# Patient Record
Sex: FEMALE | Race: WHITE
[De-identification: names, ages, dates, MRNs, and addresses within clinical notes are randomized per-mention and may not be internally consistent; named-entity substitution may affect disease eponyms.]

---

## 2017-01-26 ENCOUNTER — HOSPITAL ENCOUNTER (OUTPATIENT)
Dept: HOSPITAL 75 - RAD | Age: 82
End: 2017-01-26
Attending: INTERNAL MEDICINE
Payer: MEDICARE

## 2017-01-26 DIAGNOSIS — R10.9: Primary | ICD-10-CM

## 2017-01-26 DIAGNOSIS — Z90.5: ICD-10-CM

## 2017-01-26 PROCEDURE — 76700 US EXAM ABDOM COMPLETE: CPT

## 2017-01-26 NOTE — XMS REPORT
Continuity of Care Document

 Created on: 2017



Jigna Herring

External Reference #: [id number]

: 1931

Sex: Female



Demographics







 Address  [address] [place]  [address]

 

 Home Phone  [phone]

 

 Preferred Language  English

 

 Marital Status  

 

 Bahai Affiliation  MET

 

 Race  White or 

 

 Ethnic Group  Not  or 





Author







 Author  [facility]

 

 Organization  [facility]

 

 Address  Unknown

 

 Phone  Unavailable







Support







 Name  Relationship  Address  Phone

 

 , Jessie Guerra  ECON  Unknown  [phone]

 

 , Jenny Guerra  ECON  Unknown  [phone]







Care Team Providers







 Care Team Member Name  Role  Phone

 

 KIERA Rai  PCP  Unavailable







Source Comments

Some departments are not documenting in the electronic medical record.  If you 
do not see the information that you expected, contact Release of Information in 
the Health Information Management department at [phone] for further 
assistance in locating additional records.[facility]



Active Allergies and Adverse Reactions







    



  Allergen   Noted Date   Severity   Reactions   Comments

 

    



  Seasonal Allergies   10/13/2016   Low   RHINITIS 

 

    



  Sulfa (Sulfonamide   10/13/2016   Low   UNKNOWN 



  Antibiotics)    







Current Medications







      



  Prescription   Sig.   Disp.   Refills   Start   End Date   Status



      Date  

 

      



  levothyroxine (SYNTHROID)   Take 88 mcg by mouth       Active



  88 mcg tablet   daily 30 minutes before     



   breakfast.     

 

      



  omeprazole DR(+)   Take 40 mg by mouth daily       Active



  (PRILOSEC) 40 mg capsule   before breakfast.     

 

      



  amLODIPine (NORVASC) 5 mg   Take 5 mg by mouth every       Active



  tablet   morning.     

 

      



  ALPRAZolam (XANAX) 0.25   Take 0.25 mg by mouth at       Active



  mg tablet   bedtime daily.     

 

      



  cholecalciferol (VITAMIN   Take 1,000 Units by mouth       Active



  D-3) 1,000 units tablet   daily.     

 

      



  cyanocobalamin (VITAMIN   Inject 1 mL into the       Active



  B-12, RUBRAMIN) 1,000   muscle every 30 days.     



  mcg/mL injection      

 

      



  fish oil /omega-3 fatty   Take 1 Cap by mouth       Active



  acids (SEA-OMEGA)   daily.     



  340/1000 mg capsule      

 

      



  atenolol (TENORMIN) 25 mg   Take 25 mg by mouth every       Active



  tablet   morning.     

 

      



  guaiFENesin LA (MUCINEX)   Take 600 mg by mouth       Active



  600 mg tablet   daily.     

 

      



  ibuprofen (ADVIL) 200 mg   Take 200 mg by mouth       Active



  tablet   every 6 hours as needed     



   for Pain. Take with food.     

 

      



  fluticasone (FLONASE) 50   Apply 1 Spray to each       Active



  mcg/actuation nasal spray   nostril as directed daily     



   as needed. Shake bottle     



   gently before using.     

 

      



  albuterol 0.5%   Inhale 2.5 mg solution by       Active



  (PROVENTIL; VENTOLIN) 2.5   nebulizer as directed     



  mg/0.5 mL nebulizer   every 6 hours as needed     



  solution   for Shortness of Breath     



   or Wheezing.     

 

      



  budesonide/formoterol   Inhale 1-2 Puffs by mouth       Active



  (SYMBICORT HFA) 160/4.5   into the lungs twice     



  mcg inhalation   daily.     

 

      



  meloxicam (MOBIC) 7.5 mg   Take 7.5 mg by mouth       Active



  tablet   daily as needed for Pain.     

 

      



  traMADol (ULTRAM) 50 mg   Take 1 Tab by mouth every   30 Tab   0   20  
  Active



  tablet   6 hours as needed for     16  



   Pain.     

 

      



  hyoscyamine (ANASPAZ;   Place 1 Tab under tongue   30 Tab   3   20    
Active



  NULEV; SYMAX FASTABS;   every 4 hours as needed.     16  



  HYOMAX-FT; ED-SPAZ;      



  OSCIMIN) 0.125 mg rapid      



  dissolve tablet      







Active Problems







 



  Problem   Noted Date

 

 



  Panlobular emphysema (HCC)   2016

 

 



  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

 

 



  Urothelial cancer ([facility])   10/13/2016













  Overview:



  *Patient was taken for cystoscopy and biopsy 16, with pathology



  revealed:



  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



  - No invasion of the observed subpithelial connective tissue is detected



  *Cytology at the time of biopsy also positive for urothelial cell



  carcinoma.





  16 OPERATIVE PROCEDURE: Right robotic-assisted laparoscopic



  nephroureterectomy with bladder cuff





  Pathology:



  A. Kidney and ureter, "right kidney and ureter", nephroureterectomy:



  Kidney: Noninvasive low grade papillary urothelial carcinoma, Greatest



  dimension: 2.8 cm



  Pathologic Staging (pTNM): pTa Nx Mx





  Last Assessment & Plan:



  Needs cystoscopy in 3 months ~ pt prefers to do closer to home, she will



  call to arrange with Dr Collins



  Will need routine FU cystoscopy every 3 months





  Copy of pathology report given to pt





  Okay to return to work when she is ready, no restrictions





  Discussed increased risks of recurrence with smoking, advised smoking



  cessation







Most Recent Encounters







    



  Date   Type   Specialty   Providers   Description

 

    



  2017   Office Visit   Urology   Chano Marinelli MD   Urothelial cancer (
HCC)



      (Primary Dx)

 

    



  2017   Hospital    Chano Marinelli MD   Malignant neoplasm of



   Encounter     right kidney, except



      renal pelvis (HCC)

 

    



  2016   Surgery    Chano Marinelli MD   ROBOTIC ASSISTED



      LAPAROSCOPIC VERSUS OPEN



      NEPHROURETERECTOMY WITH



      BLADDER CUFF

 

    



  10/26/2016   Telephone   Urology   Chano Marinelli MD   Surgery







Social History







    



  Tobacco Use   Types   Packs/Day   Years Used   Date

 

    



  Current Every Day Smoker   Cigarettes   1   65 









   



  Smokeless Tobacco: Never   



  Used   









 Comments: down to 3/4 pack/day at present









   



  Alcohol Use   Drinks/Week   oz/Week   Comments

 

   



  No   







Last Filed Vital Signs







  



  Vital Sign   Reading   Time Taken

 

  



  Blood Pressure   155/72   2017  1:02 PM CST

 

  



  Pulse   67   2017  1:02 PM CST

 

  



  Temperature   36.7   C (98   F)   2016  8:32 AM CST

 

  



  Respiratory Rate   -   -

 

  



  Height   1.549 m (5' 1")   2017  1:02 PM CST

 

  



  Weight   45.541 kg (100 lb 6.4 oz)   2017  1:02 PM CST

 

  



  Body Mass Index   18.98   2017  1:02 PM CST

 

  



  Oxygen Saturation   92%   2016  9:26 AM CST







Plan of Care







   



  Health Maintenance   Due Date   Last Done   Comments

 

   



  Physical (Comprehensive)   1938  



  Exam   

 

   



  Pertussis Vaccine   1942  

 

   



  Tetanus Vaccine   1948  

 

   



  Shingles Vaccine   1991  

 

   



  Osteoporosis Screening   1996  

 

   



  Prevnar/Pneumovax (#1)   1996  

 

   



  Influenza Vaccine   2016  







Procedures from Last 3 Months







    



  Procedure Name   Priority   Date/Time   Associated Diagnosis   Comments

 

    



  PROCEDURES-SCAN    2016    Results for this



    6:15 AM CST    procedure are in the



      results section.

 

    



  PROCEDURES-SCAN    2016    Results for this



    6:14 AM CST    procedure are in the



      results section.

 

    



  ANESTHESIA ARTERIAL LINE   Routine   2016    Results for this



  INSERTION    1:28 PM CST    procedure are in the



      results section.

 

    



  ROBOTIC ASSISTED    2016   Renal pelvis transitional 



  LAPAROSCOPIC VERSUS OPEN    12:35 PM CST   cell malignant neoplasm 



  NEPHROURETERECTOMY WITH     (HCC) 



  BLADDER CUFF    







Results from Last 3 Months

BASIC METABOLIC PANEL (2017 12:10 PM)Only the most recent of 4 results 
within the time period is included.





  



  Component   Value   Range

 

  



  Sodium   138   137-147 MMOL/L

 

  



  Potassium   3.8   3.5-5.1 MMOL/L

 

  



  Chloride   106    MMOL/L

 

  



  CO2   25   21-30 MMOL/L

 

  



  Anion Gap   7   3-12

 

  



  Glucose   70    MG/DL

 

  



  Blood Urea Nitrogen   21   7-25 MG/DL

 

  



  Creatinine   1.18 (H)   0.4-1.00 MG/DL

 

  



  Calcium   9.5   8.5-10.6 MG/DL

 

  



  eGFR Non    44 (L)Comment:   >60 mL/min



   The eGFR is not validated for use in drug dosing 



   adjustments.    Continue to use 



   estimated creatinine clearance per dosing 



   reference text.    Please contact the 



   Clinical Pharmacist for questions. 

 

  



  eGFR    53 (L)Comment:   >60 mL/min



   The eGFR is not validated for use in drug dosing 



   adjustments.    Continue to use 



   estimated creatinine clearance per dosing 



   reference text.    Please contact the 



   Clinical Pharmacist for questions. 









 Specimen

 

 Blood



PROCEDURES-SCAN (2016  6:15 AM)





 Narrative

 

 



Ordered by an unspecified provider.



PROCEDURES-SCAN (2016  6:14 AM)





 Narrative

 

 



Ordered by an unspecified provider.



CHEST 2 VIEWS (2016  6:45 AM)





 Impressions

 

 



Impression: Diffuse emphysematous changes with fibrotic and scarring changes as 
described.



 



 



 Finalized by Kit De Dios M.D. on 2016 7:12 AM. Dictated by Kit De Dios M.D. on 2016 7:02 AM.



 









 Narrative

 

 



PA and lateral chest



 



Clinical indication: Increasing oxygen requirements.



 



PA and lateral views the chest were obtained. There are mild diffuse 
emphysematous changes with fibrotic changes seen in the lung bases. There is an 
area of linear scarring or fissural thickening along the minor fissure and an 
area of linear opacity in the left lung base which may represent chronic 
scarring or atelectasis. There is blunting of the posterior also consistent 
with scarring although tiny effusions cannot be excluded. There is no evidence 
of focal infiltrate or vascular congestion.



 









 Procedure Note

 

 



Interface, Radiant Results - u 2016  7:15 AM CST



PA and lateral chest



Clinical indication: Increasing oxygen requirements.



PA and lateral views the chest were obtained. There are mild diffuse 
emphysematous changes with fibrotic changes seen in the lung bases. There is an 
area of linear scarring or fissural thickening along the minor fissure and an 
area of linear opacity in the left lung base which may represent chronic 
scarring or atelectasis. There is blunting of the posterior also consistent 
with scarring although tiny effusions cannot be excluded. There is no evidence 
of focal infiltrate or vascular congestion.



IMPRESSION

Impression: Diffuse emphysematous changes with fibrotic and scarring changes as 
described.





 Finalized by Kit De Dios M.D. on 2016 7:12 AM. Dictated by Kit De Dios M.D. on 2016 7:02 AM.





PTT (APTT) (2016  7:57 AM)





  



  Component   Value   Range

 

  



  APTT   27.9   24.0-40.0 SEC









 Specimen

 

 Blood



PROTIME INR (PT) (2016  7:57 AM)





  



  Component   Value   Range

 

  



  INR   1.0   0.8-1.2









 Specimen

 

 Blood



LACTIC ACID(LACTATE) (2016  7:57 AM)





  



  Component   Value   Range

 

  



  Lactic Acid   1.3   0.5-2.0 MMOL/L









 Specimen

 

 Blood



CBC (2016  7:57 AM)Only the most recent of 2 results within the time 
period is included.





  



  Component   Value   Range

 

  



  White Blood Cells   13.1 (H)   4.5-11.0 K/UL

 

  



  RBC   4.93   4.0-5.0 M/UL

 

  



  Hemoglobin   13.0   12.0-15.0 GM/DL

 

  



  Hematocrit   40.6   36-45 %

 

  



  MCV   82.3    FL

 

  



  MCH   26.4   26-34 PG

 

  



  MCHC   32.1   32.0-36.0 G/DL

 

  



  RDW   15.0   11-15 %

 

  



  Platelet Count   133 (L)   150-400 K/UL

 

  



  MPV   9.5   7-11 FL









 Specimen

 

 Blood



SURGICAL PATHOLOGY          (2016  3:42 PM)





  



  Component   Value   Range

 

  



  PATHOLOGY REPORT   [facility] 



   [email or web address] 



   Gabriela Fox MD, PhD, Director of Anatomic Pathology 



   Department of Pathology and Laboratory Medicine 



   [address] [place] [id number] 



   Surgical Pathology 



   Office:    [phone]    Fax:    [phone] 



   SURGICAL PATHOLOGY REPORT 



   NAME: JIGNA HERRING SURG PATH #: [id number] MR 



   #: [id number] SPECIMEN 



   CLASS: SR BILLING #: [id number] ALT ID 



   #:    LOCATION: DISCHARGED DATE OF 



   PROCEDURE: 2016 AGE:    85 SEX: F DATE 



   RECEIVED: 2016 : 



   1931    TIME RECEIVED:    15:42 PHYSICIAN: 



   CHANO MARINELLI     UROLUIS DATE OF 



   REPORT: 2016 COPY TO:    DATE OF PRINTIN/30/2016 



   ################################################## 



   ###################### 



   Final Diagnosis: 



   A. Kidney and ureter, "right kidney and ureter", 



   nephroureterectomy: 



   Kidney: Noninvasive low grade papillary urothelial 



   carcinoma. See 



   comment. 



   Ureter: No diagnostic abnormalities. 



   Resection margins free of involvement. 



   Comment: 



   RENAL PELVIS: Nephroureterectomy, Complete 



   Procedure 



   Nephroureterectomy, complete 



   Specimen Laterality 



   Right 



   Tumor Size 



   Greatest dimension: 2.8 cm 



   Additional dimensions: 1.6 x 1.2 cm 



   Tumor Type 



   Noninvasive carcinoma 



   Histologic Type 



   Urothelial (transitional cell) carcinoma 



   Associated Epithelial Lesions 



   None identified 



   Histologic Grade 



   Urothelial carcinoma 



   Low-grade 



   Microscopic Tumor Extension 



   Papillary noninvasive carcinoma 



   Tumor Configuration 



   Papillary 



   Margins 



   Margins uninvolved by invasive carcinoma/carcinoma 



   in 



   situ/noninvasive high-grade urothelial carcinoma 



   Distance of carcinoma from closest margin: 19.8cm 



   Specify closest margin: Ureteral resection margin 



   Lymph-Vascular Invasion 



   Not identified 



   Pathologic Staging (pTNM): pTa Nx Mx 



   Primary Tumor (pT) 



   pTa:         Papillary noninvasive carcinoma 



   Regional Lymph Nodes (pN) 



   pNX:         Cannot be assessed 



   Distant Metastasis (pM) 



   Not applicable 



   Additional Pathologic Findings 



   Other (specify): Not identified 



   Pathologic Findings in Ipsilateral Nonneoplastic 



   Renal Tissue 



   Significant pathologic alterations 



   None identified 



   The pathologic stage assigned here should be 



   regarded as provisional, as 



   it reflects only current pathologic data and does 



   not incorporate full 



   knowledge of the patient's clinical status and/or 



   prior pathology. 



   Pursuant to the  Program at the 



   [facility] Pathology Department, selected slides 



   from this case have been 



   concurrently reviewed by the following 



   pathologist: Dr. Steve Lowe who 



   agrees with the final diagnosis. 



   Attestation: 



   By this signature, I attest that I have personally 



   formulated the final 



   interpretation expressed in this report and that 



   the above diagnosis is 



   based upon my examination of the slides and/or 



   other material indicated in 



   this report. 



   +++Electronically Signed Out 



   By+++ 



   xc/2016 



   Interpreted by: 



   [place] MD, Attending Physician 



   JULES Javier    Resident 



   2016 



   ################################################## 



   ###################### 



   Material Received: 



   A: right kidney and ureter 



   History: 



   85-year-old female with a history of renal pelvis 



   transitional cell 



   malignant neoplasm. 



   Gross Description: 



   Fixative: Fresh 



   Labeled: "right kidney and ureter" 



   Weight: 210 g grams 



   Specimen Received: Radical nephrectomy with 



   ureterectomy 



   Specimen Measurement: Kidney-10.4 x 6.2 x 4.1 cm, 



   ureter-19.8 cm in length 



   by 0.5 cm in diameter 



   Tumor size: 2.8 x 1.6 x 1.2 cm 



   Tumor location: Renal pelvis, major calyces 



   Tumor appearance: Tan-pink exophytic and friable 



   Renal vein involved by tumor: No 



   Renal artery involved by tumor: No 



   Ureter involved by tumor: No 



   Renal Pelvis: Involved with tumor 



   Renal sinus fat involved by tumor: Yes 



   Uninvolved renal parenchyma: Red-brown with 



   clearly demarcated cortical 



   medullary junctions 



   Adrenal gland: No 



   Representative sections of the specimen are 



   submitted as follows: 



   A1         Vascular and ureteral margins. 



   A2-A3         Distal 3rd of ureter. 



   A4-A5         Mid-3rd of ureter. 



   A6-A7         Proximal 3rd of ureter. 



   A8         Tumor to adjacent normal kidney. 



   A9         Tumor to adjacent uninvolved renal 



   pelvis. 



   A10-A11         Tumor at deepest invasion. 



   A representative section is submitted to the 



   [facility] 



   Facility. ([facility]) 



   A12-A30    More renal pelvic sections for possible 



   tumor. (xc) 



   [facility]/2016            JULES Javier    Resident 



LACTIC ACID (BG - RAPID LACTATE) (2016  1:42 PM)





  



  Component   Value   Range

 

  



  Lactic Acid,BG   1.0   0.5-2.0 MMOL/L



POTASSIUM, BG (2016  1:42 PM)





  



  Component   Value   Range

 

  



  Potassium   3.2 (L)   3.5-5.1 MMOL/L









 Specimen

 

 Blood



SODIUM,BG (2016  1:42 PM)





  



  Component   Value   Range

 

  



  Sodium   145   137-147 MMOL/L









 Specimen

 

 Blood



IONIZED CALCIUM,BG (2016  1:42 PM)





  



  Component   Value   Range

 

  



  Ionized Calcium   1.26   1.0-1.3 MMOL/L









 Specimen

 

 Blood



GLUCOSE,BG (2016  1:42 PM)





  



  Component   Value   Range

 

  



  Glucose   113 (H)    MG/DL









 Specimen

 

 Blood



HEMOGLOBIN & HEMATOCRIT, BG (2016  1:42 PM)





  



  Component   Value   Range

 

  



  Hemoglobin BG   13.0   12.0-15.0 GM/DL

 

  



  Hematocrit BG   39.9   36-45 %









 Specimen

 

 Blood



BLOOD GASES, ARTERIAL (2016  1:42 PM)





  



  Component   Value   Range

 

  



  pH-Arterial   7.25 (L)   7.35-7.45

 

  



  pCO2-Arterial   61 (H)   35-45 MMHG

 

  



  pO2-Arterial   118 (H)    MMHG

 

  



  Base Deficit-Arterial   1.7   MMOL/L

 

  



  O2 Sat-Arterial   97.1   95-99 %

 

  



  Bicarbonate-ART-[place]   23.0   21-28 MMOL/L









 Specimen

 

 Blood, arterial - Blood



ANESTHESIA ARTERIAL LINE INSERTION (2016  1:28 PM)





 JAMIA Cameron 20161:28 PM



 



Anesthesia Procedure: Arterial Line Placement



 



A-LINE INSERTION



Date/Time: 2016 12:55 PM



 



Patient location: OR



Indications: multiple ABGs and hemodynamic monitoring



 



Staff 



Anesthesiologist: PALMIRA ALVAREZ



Performed by: AFSHIN MENDIETA



 



Preprocedure checklist performed: 2 patient identifiers, risks & 



benefits discussed, patient evaluated, timeout performed, consent 



obtained and patient being monitored



 



Arterial Line Procedure 



Patient sedated: yes (see MAR)



Sedation type: general; 



Artery prepped with chlorhexidine; skin prep agent completely 



dried prior to procedure.



Location: radial artery



Technique: palpation



Needle gauge: 20 G



Number of attempts: 1



 



Procedure Outcome



Catheter secured with adhesive dressing applied



Events: no complications noted during insertion and skin intact, 



warm, and dry



Observation: pt tolerated well



 



 



 



 



TYPE & CROSSMATCH (2016 11:25 AM)





  



  Component   Value   Range

 

  



  Units Ordered   2 

 

  



  Crossmatch Expires   2016 

 

  



  Record Check   FOUND 

 

  



  ABO/RH(D)   O POS 

 

  



  Antibody Screen   NEG 

 

  



  Electronic Crossmatch   YES 









 Specimen

 

 Blood

## 2017-01-26 NOTE — DIAGNOSTIC IMAGING REPORT
PROCEDURE: US abdomen complete.



TECHNIQUE: Multiple real-time grayscale images were obtained

over the abdomen in various projections.



INDICATION: History of right nephrectomy, lower abdominal pain.



FINDINGS:

The liver appeared normal. The biliary ducts were not

pathologically dilated. Filling defect is persistent within the

gallbladder lumen, no shadowing stone. The gallbladder wall

however was thickened at 5 mm. No pericholecystic fluid.

Sonographic Linn's sign reportedly negative. The pancreas

could not be visualized, obscured by overlying bowel gas. The

common bile duct was 7 mm at the upper limits of normal. There

is no intrahepatic biliary dilatation. The spleen is 8 cm,

nonfocal and normal. The visualized aorta is nonaneurysmal. The

IVC is largely obscured. The kidney is absent, the left kidney

is 10.5 cm unobstructed and nonfocal.



IMPRESSION:

Absent right kidney, unobstructed left kidney. No gallstone or

pathological biliary dilatation. No ascites.



Dictated by:



Dictated on workstation # [id number]

## 2017-06-30 ENCOUNTER — HOSPITAL ENCOUNTER (OUTPATIENT)
Dept: HOSPITAL 75 - RAD | Age: 82
End: 2017-06-30
Attending: INTERNAL MEDICINE
Payer: MEDICARE

## 2017-06-30 DIAGNOSIS — J44.9: Primary | ICD-10-CM

## 2017-06-30 PROCEDURE — 71020: CPT

## 2017-06-30 NOTE — DIAGNOSTIC IMAGING REPORT
EXAMINATION: PA and lateral views of the chest.



INDICATION: Shortness of breath.



COMPARISON: 2/23/2016.



FINDINGS:

The lungs are hyperinflated with chronic interstitial thickening

and right perihilar scarring similar to 2/23/2016. The heart size

is normal. No effusion or pneumothorax.



The mediastinum and edy appear unremarkable.



IMPRESSION: COPD.



Dictated by: 



  Dictated on workstation # [id number]

## 2017-07-29 ENCOUNTER — HOSPITAL ENCOUNTER (INPATIENT)
Dept: HOSPITAL 75 - ER | Age: 82
LOS: 6 days | Discharge: HOME HEALTH | DRG: 871 | End: 2017-08-04
Attending: INTERNAL MEDICINE | Admitting: INTERNAL MEDICINE
Payer: MEDICARE

## 2017-07-29 VITALS — WEIGHT: 99.06 LBS | HEIGHT: 61 IN | BODY MASS INDEX: 18.7 KG/M2

## 2017-07-29 DIAGNOSIS — Z85.528: ICD-10-CM

## 2017-07-29 DIAGNOSIS — N18.3: ICD-10-CM

## 2017-07-29 DIAGNOSIS — H91.90: ICD-10-CM

## 2017-07-29 DIAGNOSIS — T36.95XA: ICD-10-CM

## 2017-07-29 DIAGNOSIS — Z66: ICD-10-CM

## 2017-07-29 DIAGNOSIS — T17.890A: ICD-10-CM

## 2017-07-29 DIAGNOSIS — R19.7: ICD-10-CM

## 2017-07-29 DIAGNOSIS — I12.9: ICD-10-CM

## 2017-07-29 DIAGNOSIS — E89.0: ICD-10-CM

## 2017-07-29 DIAGNOSIS — J18.9: ICD-10-CM

## 2017-07-29 DIAGNOSIS — A41.9: Primary | ICD-10-CM

## 2017-07-29 DIAGNOSIS — M81.0: ICD-10-CM

## 2017-07-29 DIAGNOSIS — Z99.81: ICD-10-CM

## 2017-07-29 DIAGNOSIS — J44.1: ICD-10-CM

## 2017-07-29 DIAGNOSIS — F17.210: ICD-10-CM

## 2017-07-29 DIAGNOSIS — J44.0: ICD-10-CM

## 2017-07-29 DIAGNOSIS — Z90.5: ICD-10-CM

## 2017-07-29 LAB
BASOPHILS # BLD AUTO: 0 10^3/UL (ref 0–0.1)
BASOPHILS NFR BLD AUTO: 0 % (ref 0–10)
EOSINOPHIL # BLD AUTO: 0 10^3/UL (ref 0–0.3)
EOSINOPHIL NFR BLD AUTO: 0 % (ref 0–10)
ERYTHROCYTE [DISTWIDTH] IN BLOOD BY AUTOMATED COUNT: 14.4 % (ref 10–14.5)
LYMPHOCYTES # BLD AUTO: 0.7 X 10^3 (ref 1–4)
LYMPHOCYTES NFR BLD AUTO: 5 % (ref 12–44)
MCH RBC QN AUTO: 28 PG (ref 25–34)
MCHC RBC AUTO-ENTMCNC: 31 G/DL (ref 32–36)
MCV RBC AUTO: 89 FL (ref 80–99)
MONOCYTES # BLD AUTO: 1 X 10^3 (ref 0–1)
MONOCYTES NFR BLD AUTO: 7 % (ref 0–12)
NEUTROPHILS # BLD AUTO: 12.4 X 10^3 (ref 1.8–7.8)
NEUTROPHILS NFR BLD AUTO: 88 % (ref 42–75)
PLATELET # BLD: 252 10^3/UL (ref 130–400)
PMV BLD AUTO: 11.1 FL (ref 7.4–10.4)
RBC # BLD AUTO: 4.77 10^6/UL (ref 4.35–5.85)
WBC # BLD AUTO: 14.1 10^3/UL (ref 4.3–11)

## 2017-07-29 PROCEDURE — 85730 THROMBOPLASTIN TIME PARTIAL: CPT

## 2017-07-29 PROCEDURE — 87040 BLOOD CULTURE FOR BACTERIA: CPT

## 2017-07-29 PROCEDURE — 84100 ASSAY OF PHOSPHORUS: CPT

## 2017-07-29 PROCEDURE — 80048 BASIC METABOLIC PNL TOTAL CA: CPT

## 2017-07-29 PROCEDURE — 94760 N-INVAS EAR/PLS OXIMETRY 1: CPT

## 2017-07-29 PROCEDURE — 94640 AIRWAY INHALATION TREATMENT: CPT

## 2017-07-29 PROCEDURE — 88112 CYTOPATH CELL ENHANCE TECH: CPT

## 2017-07-29 PROCEDURE — 96365 THER/PROPH/DIAG IV INF INIT: CPT

## 2017-07-29 PROCEDURE — 87070 CULTURE OTHR SPECIMN AEROBIC: CPT

## 2017-07-29 PROCEDURE — 80053 COMPREHEN METABOLIC PANEL: CPT

## 2017-07-29 PROCEDURE — 87205 SMEAR GRAM STAIN: CPT

## 2017-07-29 PROCEDURE — 94761 N-INVAS EAR/PLS OXIMETRY MLT: CPT

## 2017-07-29 PROCEDURE — 36415 COLL VENOUS BLD VENIPUNCTURE: CPT

## 2017-07-29 PROCEDURE — 71010: CPT

## 2017-07-29 PROCEDURE — 85007 BL SMEAR W/DIFF WBC COUNT: CPT

## 2017-07-29 PROCEDURE — 87106 FUNGI IDENTIFICATION YEAST: CPT

## 2017-07-29 PROCEDURE — 85025 COMPLETE CBC W/AUTO DIFF WBC: CPT

## 2017-07-29 PROCEDURE — 88305 TISSUE EXAM BY PATHOLOGIST: CPT

## 2017-07-29 PROCEDURE — 81000 URINALYSIS NONAUTO W/SCOPE: CPT

## 2017-07-29 PROCEDURE — 85610 PROTHROMBIN TIME: CPT

## 2017-07-29 PROCEDURE — 85027 COMPLETE CBC AUTOMATED: CPT

## 2017-07-29 PROCEDURE — 71250 CT THORAX DX C-: CPT

## 2017-07-29 PROCEDURE — 83605 ASSAY OF LACTIC ACID: CPT

## 2017-07-29 PROCEDURE — 83735 ASSAY OF MAGNESIUM: CPT

## 2017-07-29 PROCEDURE — 87101 SKIN FUNGI CULTURE: CPT

## 2017-07-29 PROCEDURE — 87116 MYCOBACTERIA CULTURE: CPT

## 2017-07-30 VITALS — SYSTOLIC BLOOD PRESSURE: 137 MMHG | DIASTOLIC BLOOD PRESSURE: 75 MMHG

## 2017-07-30 VITALS — DIASTOLIC BLOOD PRESSURE: 55 MMHG | SYSTOLIC BLOOD PRESSURE: 115 MMHG

## 2017-07-30 VITALS — SYSTOLIC BLOOD PRESSURE: 138 MMHG | DIASTOLIC BLOOD PRESSURE: 80 MMHG

## 2017-07-30 VITALS — SYSTOLIC BLOOD PRESSURE: 125 MMHG | DIASTOLIC BLOOD PRESSURE: 71 MMHG

## 2017-07-30 VITALS — DIASTOLIC BLOOD PRESSURE: 78 MMHG | SYSTOLIC BLOOD PRESSURE: 149 MMHG

## 2017-07-30 VITALS — DIASTOLIC BLOOD PRESSURE: 67 MMHG | SYSTOLIC BLOOD PRESSURE: 130 MMHG

## 2017-07-30 VITALS — DIASTOLIC BLOOD PRESSURE: 58 MMHG | SYSTOLIC BLOOD PRESSURE: 131 MMHG

## 2017-07-30 VITALS — SYSTOLIC BLOOD PRESSURE: 136 MMHG | DIASTOLIC BLOOD PRESSURE: 63 MMHG

## 2017-07-30 LAB
ALBUMIN SERPL-MCNC: 4 GM/DL (ref 3.2–4.5)
ALT SERPL-CCNC: 8 U/L (ref 0–55)
ANION GAP SERPL CALC-SCNC: 11 MMOL/L (ref 5–14)
ANION GAP SERPL CALC-SCNC: 12 MMOL/L (ref 5–14)
APTT BLD: 35 SEC (ref 24–35)
AST SERPL-CCNC: 15 U/L (ref 5–34)
BASOPHILS # BLD AUTO: 0 10^3/UL (ref 0–0.1)
BASOPHILS NFR BLD AUTO: 0 % (ref 0–10)
BILIRUB SERPL-MCNC: 0.9 MG/DL (ref 0.1–1)
BILIRUB UR QL STRIP: NEGATIVE
BUN SERPL-MCNC: 25 MG/DL (ref 7–18)
BUN SERPL-MCNC: 25 MG/DL (ref 7–18)
BUN/CREAT SERPL: 18
BUN/CREAT SERPL: 18
CALCIUM SERPL-MCNC: 8.8 MG/DL (ref 8.5–10.1)
CALCIUM SERPL-MCNC: 9.9 MG/DL (ref 8.5–10.1)
CHLORIDE SERPL-SCNC: 102 MMOL/L (ref 98–107)
CHLORIDE SERPL-SCNC: 99 MMOL/L (ref 98–107)
CO2 SERPL-SCNC: 21 MMOL/L (ref 21–32)
CO2 SERPL-SCNC: 26 MMOL/L (ref 21–32)
CREAT SERPL-MCNC: 1.37 MG/DL (ref 0.6–1.3)
CREAT SERPL-MCNC: 1.4 MG/DL (ref 0.6–1.3)
EOSINOPHIL # BLD AUTO: 0 10^3/UL (ref 0–0.3)
EOSINOPHIL NFR BLD AUTO: 0 % (ref 0–10)
ERYTHROCYTE [DISTWIDTH] IN BLOOD BY AUTOMATED COUNT: 14.2 % (ref 10–14.5)
GFR SERPLBLD BASED ON 1.73 SQ M-ARVRAT: 36 ML/MIN
GFR SERPLBLD BASED ON 1.73 SQ M-ARVRAT: 37 ML/MIN
GLUCOSE SERPL-MCNC: 134 MG/DL (ref 70–105)
GLUCOSE SERPL-MCNC: 257 MG/DL (ref 70–105)
INR PPP: 1 (ref 0.8–1.4)
KETONES UR QL STRIP: (no result)
LEUKOCYTE ESTERASE UR QL STRIP: (no result)
LYMPHOCYTES # BLD AUTO: 0.5 X 10^3 (ref 1–4)
LYMPHOCYTES NFR BLD AUTO: 4 % (ref 12–44)
MCH RBC QN AUTO: 28 PG (ref 25–34)
MCHC RBC AUTO-ENTMCNC: 31 G/DL (ref 32–36)
MCV RBC AUTO: 89 FL (ref 80–99)
MONOCYTES # BLD AUTO: 0.3 X 10^3 (ref 0–1)
MONOCYTES NFR BLD AUTO: 3 % (ref 0–12)
NEUTROPHILS # BLD AUTO: 11.2 X 10^3 (ref 1.8–7.8)
NEUTROPHILS NFR BLD AUTO: 93 % (ref 42–75)
NITRITE UR QL STRIP: NEGATIVE
PH UR STRIP: 5 [PH] (ref 5–9)
PLATELET # BLD: 194 10^3/UL (ref 130–400)
PMV BLD AUTO: 11.7 FL (ref 7.4–10.4)
POTASSIUM SERPL-SCNC: 4.3 MMOL/L (ref 3.6–5)
POTASSIUM SERPL-SCNC: 4.4 MMOL/L (ref 3.6–5)
PROT SERPL-MCNC: 8 GM/DL (ref 6.4–8.2)
PROT UR QL STRIP: (no result)
PROTHROMBIN TIME: 12.9 SEC (ref 12.2–14.7)
RBC # BLD AUTO: 4.2 10^6/UL (ref 4.35–5.85)
SODIUM SERPL-SCNC: 134 MMOL/L (ref 135–145)
SODIUM SERPL-SCNC: 137 MMOL/L (ref 135–145)
SP GR UR STRIP: 1.02 (ref 1.02–1.02)
UROBILINOGEN UR-MCNC: NORMAL MG/DL
WBC # BLD AUTO: 12 10^3/UL (ref 4.3–11)
WBC #/AREA URNS HPF: (no result) /HPF

## 2017-07-30 RX ADMIN — IPRATROPIUM BROMIDE AND ALBUTEROL SULFATE SCH ML: .5; 3 SOLUTION RESPIRATORY (INHALATION) at 14:15

## 2017-07-30 RX ADMIN — IPRATROPIUM BROMIDE AND ALBUTEROL SULFATE SCH ML: .5; 3 SOLUTION RESPIRATORY (INHALATION) at 19:10

## 2017-07-30 RX ADMIN — PANTOPRAZOLE SODIUM SCH MG: 40 TABLET, DELAYED RELEASE ORAL at 13:00

## 2017-07-30 RX ADMIN — SODIUM CHLORIDE SCH MLS/HR: 900 INJECTION INTRAVENOUS at 15:31

## 2017-07-30 RX ADMIN — AMLODIPINE BESYLATE SCH MG: 5 TABLET ORAL at 12:59

## 2017-07-30 RX ADMIN — METHYLPREDNISOLONE SODIUM SUCCINATE SCH MG: 40 INJECTION, POWDER, FOR SOLUTION INTRAMUSCULAR; INTRAVENOUS at 12:00

## 2017-07-30 RX ADMIN — SODIUM CHLORIDE SCH MLS/HR: 900 INJECTION, SOLUTION INTRAVENOUS at 18:04

## 2017-07-30 RX ADMIN — SODIUM CHLORIDE SCH MLS/HR: 900 INJECTION INTRAVENOUS at 08:28

## 2017-07-30 RX ADMIN — ALPRAZOLAM SCH MG: 0.25 TABLET ORAL at 22:59

## 2017-07-30 RX ADMIN — IPRATROPIUM BROMIDE AND ALBUTEROL SULFATE SCH ML: .5; 3 SOLUTION RESPIRATORY (INHALATION) at 06:26

## 2017-07-30 RX ADMIN — SODIUM CHLORIDE SCH MLS/HR: 900 INJECTION, SOLUTION INTRAVENOUS at 02:15

## 2017-07-30 RX ADMIN — NICOTINE SCH MG: 14 PATCH, EXTENDED RELEASE TRANSDERMAL at 09:09

## 2017-07-30 RX ADMIN — IPRATROPIUM BROMIDE AND ALBUTEROL SULFATE SCH ML: .5; 3 SOLUTION RESPIRATORY (INHALATION) at 10:04

## 2017-07-30 RX ADMIN — METHYLPREDNISOLONE SODIUM SUCCINATE SCH MG: 40 INJECTION, POWDER, FOR SOLUTION INTRAMUSCULAR; INTRAVENOUS at 05:39

## 2017-07-30 RX ADMIN — ATENOLOL SCH MG: 25 TABLET ORAL at 13:00

## 2017-07-30 RX ADMIN — IPRATROPIUM BROMIDE AND ALBUTEROL SULFATE SCH ML: .5; 3 SOLUTION RESPIRATORY (INHALATION) at 21:47

## 2017-07-30 RX ADMIN — LEVOTHYROXINE SODIUM SCH MCG: 0.09 TABLET ORAL at 13:00

## 2017-07-30 RX ADMIN — METHYLPREDNISOLONE SODIUM SUCCINATE SCH MG: 40 INJECTION, POWDER, FOR SOLUTION INTRAMUSCULAR; INTRAVENOUS at 18:02

## 2017-07-30 RX ADMIN — SODIUM CHLORIDE SCH MLS/HR: 900 INJECTION INTRAVENOUS at 23:00

## 2017-07-30 NOTE — ED GENERAL
General


Chief Complaint:  Respiratory Problems


Stated Complaint:  SOA


Nursing Triage Note:  


daughter states since thursday breathing has been worse. pt usually wears 

oxygen 


at night. hx of COPD


Nursing Sepsis Screen:  No Definite Risk


Source of Information:  Patient, Family


Exam Limitations:  No Limitations





History of Present Illness


Time Seen by Provider:  23:35


Initial Comments


This 86-year-old woman presents to the emergency room with respiratory 

distress.  She arrives via EMS.  O2 saturation was reportedly 70 percent on 

room air.  She uses 3 L by nasal cannula at home.  She reports cough and upset 

stomach that started on Thursday.  Temperature on arrival today is 101.7.  

Family reports her heart rate at one point at home was 140.  She has not used 

any breathing treatments of any kind today.  Patient is still a smoker.  She 

confirms DO NOT RESUSCITATE orders.





Allergies and Home Medications


Allergies


Coded Allergies:  


     No Known Drug Allergies (Verified , 10/6/14)





Home Medications


Alprazolam 0.25 Mg Tablet, 0.25 MG PO HS, (Reported)


Amlodipine Besylate 5 Mg Tablet, 5 MG PO DAILY, (Reported)


Atenolol 25 Mg Tablet, 25 MG PO DAILY, (Reported)


Cholecalciferol 1,000 Unit Tablet, 1,000 UNIT PO DAILY, (Reported)


Levothyroxine Sodium 88 Mcg Tab, 88 MCG PO DAILY, (Reported)


Meloxicam 7.5 Mg Tablet, 7.5 MG PO DAILY PRN for PAIN, (Reported)


Omega 3 Polyunsat Fatty Acids 1,000 Mg Cap, 1,000 MG PO DAILY, (Reported)


Omeprazole 40 Mg Capsule.dr, 40 MG PO DAILY, (Reported)





Constitutional:  see HPI


EENTM:  no symptoms reported


Respiratory:  see HPI


Cardiovascular:  no symptoms reported


Gastrointestinal:  see HPI, nausea


Genitourinary:  no symptoms reported


Musculoskeletal:  no symptoms reported


Skin:  no symptoms reported


Psychiatric/Neurological:  See HPI


Hematologic/Lymphatic:  No Symptoms Reported


Immunological/Allergic:  no symptoms reported





Past Medical-Social-Family Hx


Patient Social History


Recent Foreign Travel:  No


Contact w/Someone Who Travel:  No


Recent Infectious Disease Expo:  No





Immunizations Up To Date


Date of Influenza Vaccine:  Sep 1, 2011





Surgeries


HX Surgeries:  Yes (RIGHT HIP,HYST.,THYROIDECTOMY 95%, cataract)


Surgeries:  Hysterectomy, Lumpectomy, Orthopedic, Renal (nephrectomy)





Respiratory


Hx Respiratory Disorders:  Yes (COPD)


Respiratory Disorders:  COPD (with chronic hypoxia)





Cardiovascular


Hx Cardiac Disorders:  Yes


Cardiac Disorders:  Hypertension





Neurological


Hx Neurological Disorders:  No





Reproductive System


Hx Reproductive Disorders:  No


Sexually Transmitted Disease:  No





Genitourinary


Hx Genitourinary Disorders:  No





Gastrointestinal


Hx Gastrointestinal Disorders:  No





Musculoskeletal


Hx Musculoskeletal Disorders:  Yes


Musculoskeletal Disorders:  Osteoporosis





Endocrine


Hx Endocrine Disorders:  Yes


Endocrine Disorders:  Hypothyroidsim





HEENT


HX ENT Disorders:  Yes (cataracts removed bilat)





Cancer


Hx Cancer:  Yes


Cancer:  Kidney





Psychosocial


Hx Psychiatric Problems:  No





Integumentary


HX Skin/Integumentary Disorder:  No





Blood Transfusions


Hx Blood Disorders:  No





Family Medical History


Family Medial History:  


Alcoholism


  19 FATHER


Arthritis


  19 MOTHER


Diabetes mellitus


  19 MOTHER


Hypertension


  19 MOTHER


Neoplasm (breast cancer)


  19 MOTHER


Osteoporosis


  19 MOTHER





Physical Exam


Vital Signs





Vital Sign - Last 12Hours








 7/29/17





 23:33


 


Temp 101.7


 


Pulse 88


 


Resp 20


 


B/P (MAP) 133/76


 


Pulse Ox 70


 


O2 Delivery Room Air


 


O2 Flow Rate 5.00





Capillary Refill : Less Than 3 Seconds


General Appearance:  WD/WN, Moderate Distress (respiratory)


HEENT:  PERRL/EOMI, Normal ENT Inspection, Pharynx Normal


Neck:  Normal Inspection


Respiratory:  Accessory Muscle Use, Crackles, Respiratory Distress, Rhonci


Cardiovascular:  Regular Rate, Rhythm, No Edema, No Murmur


Gastrointestinal:  Normal Bowel Sounds, Non Tender, Soft


Extremity:  Normal Inspection, No Pedal Edema


Neurologic/Psychiatric:  Alert, Oriented x3, No Motor/Sensory Deficits, Normal 

Mood/Affect, CNs II-XII Norm as Tested


Skin:  Normal Color, Warm/Dry





Focused Exam


Lactic Acid Level





Laboratory Tests








Test


  7/29/17


23:40


 


Lactic Acid Level


  1.31 MMOL/L


(0.50-2.00)











Progress/Results/Core Measures


Results/Orders


Lab Results





Laboratory Tests








Test


  7/29/17


23:40 7/30/17


01:20 Range/Units


 


 


White Blood Count


  14.1 H


  


  4.3-11.0


10^3/uL


 


Red Blood Count


  4.77 


  


  4.35-5.85


10^6/uL


 


Hemoglobin 13.2   11.5-16.0  G/DL


 


Hematocrit 42   35-52  %


 


Mean Corpuscular Volume 89   80-99  FL


 


Mean Corpuscular Hemoglobin 28   25-34  PG


 


Mean Corpuscular Hemoglobin


Concent 31 L


  


  32-36  G/DL


 


 


Red Cell Distribution Width 14.4   10.0-14.5  %


 


Platelet Count


  252 


  


  130-400


10^3/uL


 


Mean Platelet Volume 11.1 H  7.4-10.4  FL


 


Neutrophils (%) (Auto) 88 H  42-75  %


 


Lymphocytes (%) (Auto) 5 L  12-44  %


 


Monocytes (%) (Auto) 7   0-12  %


 


Eosinophils (%) (Auto) 0   0-10  %


 


Basophils (%) (Auto) 0   0-10  %


 


Neutrophils # (Auto) 12.4 H  1.8-7.8  X 10^3


 


Lymphocytes # (Auto) 0.7 L  1.0-4.0  X 10^3


 


Monocytes # (Auto) 1.0   0.0-1.0  X 10^3


 


Eosinophils # (Auto)


  0.0 


  


  0.0-0.3


10^3/uL


 


Basophils # (Auto)


  0.0 


  


  0.0-0.1


10^3/uL


 


Prothrombin Time 12.9   12.2-14.7  SEC


 


INR Comment 1.0   0.8-1.4  


 


Activated Partial


Thromboplast Time 35 


  


  24-35  SEC


 


 


Sodium Level 137   135-145  MMOL/L


 


Potassium Level 4.4   3.6-5.0  MMOL/L


 


Chloride Level 99     MMOL/L


 


Carbon Dioxide Level 26   21-32  MMOL/L


 


Anion Gap 12   5-14  MMOL/L


 


Blood Urea Nitrogen 25 H  7-18  MG/DL


 


Creatinine


  1.40 H


  


  0.60-1.30


MG/DL


 


Estimat Glomerular Filtration


Rate 36 


  


   


 


 


BUN/Creatinine Ratio 18    


 


Glucose Level 134 H    MG/DL


 


Lactic Acid Level


  1.31 


  


  0.50-2.00


MMOL/L


 


Calcium Level 9.9   8.5-10.1  MG/DL


 


Total Bilirubin 0.9   0.1-1.0  MG/DL


 


Aspartate Amino Transf


(AST/SGOT) 15 


  


  5-34  U/L


 


 


Alanine Aminotransferase


(ALT/SGPT) 8 


  


  0-55  U/L


 


 


Alkaline Phosphatase 81     U/L


 


Total Protein 8.0   6.4-8.2  GM/DL


 


Albumin 4.0   3.2-4.5  GM/DL


 


Urine Color  YELLOW   


 


Urine Clarity  CLEAR   


 


Urine pH  5  5-9  


 


Urine Specific Gravity  1.020  1.016-1.022  


 


Urine Protein  3+ H NEGATIVE  


 


Urine Glucose (UA)  NEGATIVE  NEGATIVE  


 


Urine Ketones  1+ H NEGATIVE  


 


Urine Nitrite  NEGATIVE  NEGATIVE  


 


Urine Bilirubin  NEGATIVE  NEGATIVE  


 


Urine Urobilinogen  NORMAL  NORMAL  MG/DL


 


Urine Leukocyte Esterase  1+ H NEGATIVE  


 


Urine RBC (Auto)  2+ H NEGATIVE  


 


Urine RBC  2-5 H  /HPF


 


Urine WBC  0-2   /HPF


 


Urine Squamous Epithelial


Cells 


  10-25 H


   /HPF


 


 


Urine Crystals  NONE   /LPF


 


Urine Bacteria  TRACE   /HPF


 


Urine Casts  NONE   /LPF


 


Urine Mucus  MODERATE H  /LPF


 


Urine Culture Indicated  NO   








My Orders





Orders - MIKE MIRELES MD


Albuterol/Ipra Inhalation Soln (Duoneb I (7/29/17 23:45)


Cbc With Automated Diff (7/29/17 23:45)


Comprehensive Metabolic Panel (7/29/17 23:45)


Lactic Acid Analyzer (7/29/17 23:45)


Blood Culture (7/29/17 23:45)


Sputum Culture (7/29/17 23:45)


Ua Culture If Indicated (7/29/17 23:45)


Protime With Inr (7/29/17 23:45)


Partial Thromboplastin Time (7/29/17 23:45)


Chest 1 View, Ap/Pa Only (7/29/17 23:45)


O2 (7/29/17 23:45)


Saline Lock/Iv-Start (7/29/17 23:45)


Saline Lock/Iv-Start (7/29/17 23:45)


Vital Signs Adult Sepsis Patie Q1HR (7/29/17 23:45)


Remove Rings In Anticipation O (7/29/17 23:45)


Svn Sm Volume Nebulizer Rt-Rfs (7/29/17 23:45)


Methylprednisolone Sod Succ (Solu-Medrol (7/29/17 23:45)


Piperacillin Sodium/Tazobactam (Zosyn Vi (7/30/17 01:00)


Ns Iv 500 Ml (Sodium Chloride 0.9%) (7/30/17 01:15)





Medications Given in ED





Current Medications








 Medications  Dose


 Ordered  Sig/[place]


 Route  Start Time


 Stop Time Status Last Admin


Dose Admin


 


 Albuterol/


 Ipratropium  3 ml  ONCE  ONCE


 INH  7/29/17 23:45


 7/29/17 23:47 DC 7/29/17 23:52


3 ML


 


 Methylprednisolone


 Sodium Succinate  62.5 mg  ONCE  ONCE


 IVP  7/29/17 23:45


 7/29/17 23:47 DC 7/30/17 01:13


62.5 MG


 


 Piperacillin Sod/


 Tazobactam Sod


 4.5 gm/Sodium


 Chloride  100 ml @ 


 200 mls/hr  ONCE  ONCE


 IV  7/30/17 01:00


 7/30/17 01:29 DC 7/30/17 01:14


200 MLS/HR








Vital Signs/I&O





Vital Sign - Last 12Hours








 7/29/17 7/29/17 7/30/17





 23:33 23:33 01:47


 


Temp 101.7  100.6


 


Pulse 88  87


 


Resp 20  18


 


B/P (MAP) 133/76  


 


Pulse Ox 70  91


 


O2 Delivery Room Air Nasal Cannula Nasal Cannula


 


O2 Flow Rate  5.00 5.00














Blood Pressure Mean:  95








Progress Note :  


Progress Note


Septic workup was ordered after initial assessment.  Patient received a DuoNeb 

treatment with significant improvement.  Solu-Medrol 62.5 mg was also 

administered.  Chest x-ray revealed some increased markings in the right 

perihilar region.  Pneumonia was suspected.  Zosyn was ordered for initial 

antibiotic therapy.  500 mL normal saline bolus was also ordered.  Patient has 

some renal insufficiency which may reflect interval nephrectomy.





Diagnostic Imaging





   Diagonstic Imaging:  Xray


   Plain Films/CT/US/NM/MRI:  chest


Comments


Chest x-ray viewed by me.  Report not yet available.  There are increased 

markings in the right perihilar region.  Exam is obscured by chronic scarring.  

Pneumonia is suspected.





Departure


Communication


Time/Spoke to Admitting Phy:  01:08


Communication


Dr. Liu





Impression


Impression:  


 Primary Impression:  


 Sepsis


 Qualified Codes:  A41.9 - Sepsis, unspecified organism


 Additional Impressions:  


 Pneumonia


 Qualified Codes:  J18.1 - Lobar pneumonia, unspecified organism


 COPD exacerbation


Disposition:  09 ADMITTED AS INPATIENT


Condition:  Improved


Time/Decision to Admit Time:  00:45





Departure-Patient Inst.


Referrals:  


DASHAWN YOUNG DO (PCP)


Primary Care Physician








TIO TORIBIO DO (Family)


Primary Care Physician











MIKE MIRELES MD Jul 30, 2017 01:14

## 2017-07-30 NOTE — DIAGNOSTIC IMAGING REPORT
INDICATION: Shortness of air with history of COPD.



EXAMINATION: Frontal chest dated 7/30/2017



COMPARISON: 6/30/2017



FINDINGS:



The lungs are hyperinflated. There is a focus of atelectasis or

infiltrate in right perihilar region. The heart is prominent.

Coarsened interstitial markings seen throughout both lungs with

no effusions or pneumothorax. Increased densities at the upper

mediastinum likely due to prominence of the vasculature or due to

prominent thyroid gland.



IMPRESSION: 

1. Diffuse chronic changes described with a more focal area of

either atelectasis or infiltrate in the right perihilar region.

2. Mild prominence of the heart and pulmonary vasculature.

3. Findings of COPD.



Dictated by: 



  Dictated on workstation # [id number]

## 2017-07-30 NOTE — DIAGNOSTIC IMAGING REPORT
INDICATION: COPD and pneumonia.



EXAMINATION: Chest 01/30/2017, 5:04 a.m.



COMPARISON: 07/30/2017 at 12:07 a.m.



FINDINGS:



Again noted is a focus of atelectasis and possibly superimposed

infiltrate in right perihilar region. Findings of coarsened

interstitial markings seen throughout both lungs. There is a tiny

left effusion. No pneumothorax. Hyperinflation stable.



IMPRESSION:

1. Stable chest.



Dictated by: 



  Dictated on workstation # [id number]

## 2017-07-30 NOTE — HISTORY & PHYSICAL-HOSPITALIST
HPI


History of Present Illness:


HPI/Chief Complaint


Mrs. Herring is a pleasant 86-year-old white female who reports that she was 

feeling in her usual state of health until Thursday.  Posterior 86 birthday and 

she was out playing bingo.  She felt fatigued while there but then developed 

Reiger's with chills.  Following that she had increase in her baseline cough 

that was nonproductive.  She had no further Reiger's but continued to feel 

poorly with increased cough and progressive shortness of breath.  Reportedly 

her O2 saturations on 3 L at home were down in the 70 percent when she arrived 

in the emergency room and was in significant respiratory distress.  After 

breathing treatments and Solu-Medrol her status improved.  Right perihilar 

infiltrate was reported by the urgency room physician and she was admitted on 

the pneumonia protocol.  She continues to smoke about half a pack of cigarettes 

per day and due to her advanced age and known lung disease requested DO NOT 

RESUSCITATE status which is been initiated.  Upon my arrival she was eating 

breakfast and feeling significantly better denying shortness of breath at rest 

with nonproductive cough.  She denies chest pain and was alert and oriented.


Date Seen


17


Time Seen by Provider:  07:15


Attending Physician


Lacey Denton MD


PCP


Cody Rai DO


Referring Physician





Date of Admission


2017 at 01:13





Home Medications & Allergies


Home Medications


Reviewed patient Home Medication Reconciliation Form





Allergies





Allergies


Coded Allergies


  No Known Drug Allergies ([id number]/6/14)








Past Medical-Social-Family Hx


Patient Social History


Alcohol Use:  Denies Use


Recreational Drug Use:  No


Smoking Status:  Current Everyday Smoker


Type Used:  Cigarettes


Physical Abuse Screen:  No


Sexual Abuse:  No


Recent Foreign Travel:  No


Contact w/other who traveled:  No


Recent Hopitalizations:  Yes


Recent Infectious Disease Expo:  No





Immunizations Up To Date


Date of Pneumonia Vaccine:  2016


Date of Influenza Vaccine:  Sep 1, 2011





Seasonal Allergies


Seasonal Allergies:  No





Surgeries


HX Surgeries:  Yes (RIGHT HIP,HYST.,THYROIDECTOMY 95%, cataract)


Surgeries:  Hysterectomy, Lumpectomy, Orthopedic, Renal (nephrectomy)





Respiratory


Hx Respiratory Disorders:  Yes (COPD)





Cardiovascular


Hx Cardiovascular Disorders:  Yes


Cardiac Disorders:  Hypertension





Neurological


Hx Neurological Disorders:  No





Reproductive System


Pregnant:  No


Hx Reproductive Disorders:  No


Sexually Transmitted Disease:  No


HIV/AIDS:  No


Female Reproductive Disorders:  Denies





Genitourinary


Hx Genitourinary Disorders:  No





Gastrointestinal


Hx Gastrointestinal Disorders:  No





Musculoskeletal


Hx Musculoskeletal Disorders:  Yes


Musculoskeletal Disorders:  Osteoporosis





Endocrine


Hx Endocrine Disorders:  Yes


Endocrine Disorders:  Hypothyroidsim





HEENT


HX ENT Disorders:  Yes (cataracts removed bilat)


HEENT Disorders:  Cataract


Loss of Vision:  Denies


Hearing Impairment:  Hard of Hearing





Cancer


Hx Cancer:  Yes


Cancer:  Kidney





Psychosocial


Hx Psychiatric Problems:  No





Integumentary


HX Skin/Integumentary Disorder:  No





Blood Transfusions


Hx Blood Disorders:  No


Adverse Reaction to a Blood Tr:  No





Family Medical History


Family Hx:  


Alcoholism


  19 FATHER


Arthritis


  19 MOTHER


Diabetes mellitus


  19 MOTHER


Hypertension


  19 MOTHER


Neoplasm (breast cancer)


  19 MOTHER


Osteoporosis


  19 MOTHER





Review of Systems


Constitutional:  No no symptoms reported, see HPI, chills, No diaphoresis, No 

dizziness, No fever, No malaise, weakness


Respiratory:  No no symptoms reported, No see HPI, cough, dyspnea on exertion, 

No hemoptysis, No orthopnea, No phlegm, short of breath, No stridor, No wheezing


Cardiovascular:  see HPI





Physical Exam


Physical Exam


Vital Signs





Vital Sign - Last 12Hours








 17





 23:33


 


Temp 101.7


 


Pulse 88


 


Resp 20


 


B/P (MAP) 133/76


 


Pulse Ox 70


 


O2 Delivery Room Air


 


O2 Flow Rate 5.00





Capillary Refill : Less Than 3 Seconds


General Appearance:  No Apparent Distress, Chronically ill, Thin


Eyes:  Bilateral Eye EOMI, Bilateral Eye PERRL


Respiratory:  No Accessory Muscle Use, No Respiratory Distress, Other (in rales 

on the left with diminished breath sounds no rales noted on the right.  No 

wheezing appreciated.)


Cardiovascular:  Regular Rate, Rhythm, No Edema, No Gallop, No JVD, No Murmur, 

Normal Peripheral Pulses


Gastrointestinal:  Normal Bowel Sounds, No Organomegaly, No Pulsatile Mass, Non 

Tender, Soft


Extremity:  Normal Capillary Refill, Normal Inspection, Normal Range of Motion, 

Non Tender, No Calf Tenderness, No Pedal Edema





Results


Results/Procedures


Lab


Laboratory Tests


17 23:40








17 05:25














Assessment/Plan


Admission Diagnosis


1.  Atypical pneumonia most likely with acute exacerbation of COPD.  Continue 

broad-spectrum antibiotic coverage.


2.  Patient meets criteria for sepsis not severe.


3.  Likely stage III chronic renal disease.





Clinical Quality Measures


DVT/VTE Risk/Contraindication:


Risk Factor Score Per Nursin


RFS Level Per Nursing on Admit:  4+=Very High











LACEY DENTON MD 2017 11:56

## 2017-07-31 VITALS — DIASTOLIC BLOOD PRESSURE: 61 MMHG | SYSTOLIC BLOOD PRESSURE: 135 MMHG

## 2017-07-31 VITALS — SYSTOLIC BLOOD PRESSURE: 145 MMHG | DIASTOLIC BLOOD PRESSURE: 65 MMHG

## 2017-07-31 VITALS — DIASTOLIC BLOOD PRESSURE: 66 MMHG | SYSTOLIC BLOOD PRESSURE: 143 MMHG

## 2017-07-31 VITALS — SYSTOLIC BLOOD PRESSURE: 143 MMHG | DIASTOLIC BLOOD PRESSURE: 67 MMHG

## 2017-07-31 VITALS — SYSTOLIC BLOOD PRESSURE: 132 MMHG | DIASTOLIC BLOOD PRESSURE: 63 MMHG

## 2017-07-31 VITALS — DIASTOLIC BLOOD PRESSURE: 69 MMHG | SYSTOLIC BLOOD PRESSURE: 150 MMHG

## 2017-07-31 LAB
ANION GAP SERPL CALC-SCNC: 13 MMOL/L (ref 5–14)
BUN SERPL-MCNC: 24 MG/DL (ref 7–18)
BUN/CREAT SERPL: 19
CALCIUM SERPL-MCNC: 8.5 MG/DL (ref 8.5–10.1)
CHLORIDE SERPL-SCNC: 108 MMOL/L (ref 98–107)
CO2 SERPL-SCNC: 20 MMOL/L (ref 21–32)
CREAT SERPL-MCNC: 1.25 MG/DL (ref 0.6–1.3)
ERYTHROCYTE [DISTWIDTH] IN BLOOD BY AUTOMATED COUNT: 14.3 % (ref 10–14.5)
GFR SERPLBLD BASED ON 1.73 SQ M-ARVRAT: 41 ML/MIN
GLUCOSE SERPL-MCNC: 300 MG/DL (ref 70–105)
MAGNESIUM SERPL-MCNC: 1.3 MG/DL (ref 1.8–2.4)
MCH RBC QN AUTO: 28 PG (ref 25–34)
MCHC RBC AUTO-ENTMCNC: 32 G/DL (ref 32–36)
MCV RBC AUTO: 89 FL (ref 80–99)
PHOSPHATE SERPL-MCNC: 3.9 MG/DL (ref 2.3–4.7)
PLATELET # BLD: 218 10^3/UL (ref 130–400)
PMV BLD AUTO: 10.7 FL (ref 7.4–10.4)
POTASSIUM SERPL-SCNC: 3.7 MMOL/L (ref 3.6–5)
RBC # BLD AUTO: 4.01 10^6/UL (ref 4.35–5.85)
SODIUM SERPL-SCNC: 141 MMOL/L (ref 135–145)
WBC # BLD AUTO: 11.6 10^3/UL (ref 4.3–11)

## 2017-07-31 RX ADMIN — METHYLPREDNISOLONE SODIUM SUCCINATE SCH MG: 40 INJECTION, POWDER, FOR SOLUTION INTRAMUSCULAR; INTRAVENOUS at 11:36

## 2017-07-31 RX ADMIN — NICOTINE SCH MG: 14 PATCH, EXTENDED RELEASE TRANSDERMAL at 10:32

## 2017-07-31 RX ADMIN — IPRATROPIUM BROMIDE AND ALBUTEROL SULFATE SCH ML: .5; 3 SOLUTION RESPIRATORY (INHALATION) at 14:06

## 2017-07-31 RX ADMIN — IPRATROPIUM BROMIDE AND ALBUTEROL SULFATE SCH ML: .5; 3 SOLUTION RESPIRATORY (INHALATION) at 22:14

## 2017-07-31 RX ADMIN — ALPRAZOLAM SCH MG: 0.25 TABLET ORAL at 22:11

## 2017-07-31 RX ADMIN — LEVOTHYROXINE SODIUM SCH MCG: 0.09 TABLET ORAL at 09:06

## 2017-07-31 RX ADMIN — IPRATROPIUM BROMIDE AND ALBUTEROL SULFATE SCH ML: .5; 3 SOLUTION RESPIRATORY (INHALATION) at 10:31

## 2017-07-31 RX ADMIN — ATENOLOL SCH MG: 25 TABLET ORAL at 09:06

## 2017-07-31 RX ADMIN — IPRATROPIUM BROMIDE AND ALBUTEROL SULFATE SCH ML: .5; 3 SOLUTION RESPIRATORY (INHALATION) at 01:50

## 2017-07-31 RX ADMIN — IPRATROPIUM BROMIDE AND ALBUTEROL SULFATE SCH ML: .5; 3 SOLUTION RESPIRATORY (INHALATION) at 06:57

## 2017-07-31 RX ADMIN — METHYLPREDNISOLONE SODIUM SUCCINATE SCH MG: 40 INJECTION, POWDER, FOR SOLUTION INTRAMUSCULAR; INTRAVENOUS at 18:45

## 2017-07-31 RX ADMIN — SODIUM CHLORIDE SCH MLS/HR: 900 INJECTION INTRAVENOUS at 15:45

## 2017-07-31 RX ADMIN — SODIUM CHLORIDE SCH MLS/HR: 900 INJECTION, SOLUTION INTRAVENOUS at 22:12

## 2017-07-31 RX ADMIN — METHYLPREDNISOLONE SODIUM SUCCINATE SCH MG: 40 INJECTION, POWDER, FOR SOLUTION INTRAMUSCULAR; INTRAVENOUS at 05:07

## 2017-07-31 RX ADMIN — METHYLPREDNISOLONE SODIUM SUCCINATE SCH MG: 40 INJECTION, POWDER, FOR SOLUTION INTRAMUSCULAR; INTRAVENOUS at 00:20

## 2017-07-31 RX ADMIN — SODIUM CHLORIDE SCH MLS/HR: 900 INJECTION, SOLUTION INTRAVENOUS at 04:15

## 2017-07-31 RX ADMIN — Medication SCH MG: at 11:35

## 2017-07-31 RX ADMIN — IPRATROPIUM BROMIDE AND ALBUTEROL SULFATE SCH ML: .5; 3 SOLUTION RESPIRATORY (INHALATION) at 18:23

## 2017-07-31 RX ADMIN — AMLODIPINE BESYLATE SCH MG: 5 TABLET ORAL at 09:07

## 2017-07-31 RX ADMIN — SODIUM CHLORIDE SCH MLS/HR: 900 INJECTION INTRAVENOUS at 06:25

## 2017-07-31 RX ADMIN — PANTOPRAZOLE SODIUM SCH MG: 40 TABLET, DELAYED RELEASE ORAL at 09:06

## 2017-07-31 RX ADMIN — SODIUM CHLORIDE SCH MLS/HR: 900 INJECTION INTRAVENOUS at 22:11

## 2017-07-31 NOTE — PULMONARY CONSULTATION
History of Present Illness


History of Present Illness


Date of Consultation


17


 08:49


Time Seen by Provider:  09:59


Date of Admission





History of Present Illness


85yo with hx of oxygen dependent COPD (requires 3 liters of oxygne at home) and 

current smoker presented to ED secondary to nonproductive cough rigors and 

chills that continued to progress to worsening SOB/ respiratory distress. Her 

Sp02 at home was 70% while on her home oxygen. CXR shows right perihilar 

infiltration. PT is a DNR per her request. No CP. PT has hx of right renal cell 

carcinoma s/p nephrectomy at  .





Allergies and Home Medications


Allergies


Coded Allergies:  


     No Known Drug Allergies (Verified , 10/6/14)





Home Medications


Alprazolam 0.25 Mg Tablet, 0.25 MG PO HS, (Reported)


Amlodipine Besylate 5 Mg Tablet, 5 MG PO DAILY, (Reported)


Atenolol 25 Mg Tablet, 25 MG PO DAILY, (Reported)


Cholecalciferol 1,000 Unit Tablet, 1,000 UNIT PO DAILY, (Reported)


Levothyroxine Sodium 88 Mcg Tab, 88 MCG PO DAILY, (Reported)


Magnesium Oxide 400 Mg Tablet, 400 MG PO DAILY for 1 Days


   Prescribed by: KATHY BARRERA on 17 1155


Meloxicam 7.5 Mg Tablet, 7.5 MG PO DAILY PRN for PAIN, (Reported)


Omega 3 Polyunsat Fatty Acids 1,000 Mg Cap, 1,000 MG PO DAILY, (Reported)


Omeprazole 40 Mg Capsule.dr, 40 MG PO DAILY, (Reported)


[Brio]  , 1 PUFF IH DAILY, (Reported)





Past Medical-Social-Family Hx


Patient Social History


Alcohol Use:  Denies Use


Recreational Drug Use:  No


Smoking Status:  Current Everyday Smoker


Type Used:  Cigarettes


Recent Foreign Travel:  No


Contact w/Someone Who Travel:  No


Recent Infectious Disease Expo:  No


Recent Hopitalizations:  Yes


Physical Abuse Screen:  No


Sexual Abuse:  No





Immunizations Up To Date


PED Vaccines UTD:  Yes


Date of Pneumonia Vaccine:  2016


Date of Influenza Vaccine:  Sep 1, 2011





Seasonal Allergies


Seasonal Allergies:  No





Surgeries


HX Surgeries:  Yes (RIGHT HIP,HYST.,THYROIDECTOMY 95%, cataract)


Surgeries:  Hysterectomy, Lumpectomy, Orthopedic, Renal (nephrectomy)





Respiratory


Hx Respiratory Disorders:  Yes (COPD)


Respiratory Disorders:  Pneumonia, COPD, Emphysema





Cardiovascular


Hx Cardiac Disorders:  Yes


Cardiac Disorders:  Hypertension





Neurological


Hx Neurological Disorders:  No





Reproductive System


Pregnant:  No


Hx Reproductive Disorders:  No


Sexually Transmitted Disease:  No


HIV/AIDS:  No


Female Reproductive Disorders:  Denies





Genitourinary


Hx Genitourinary Disorders:  No





Gastrointestinal


Hx Gastrointestinal Disorders:  No





Musculoskeletal


Hx Musculoskeletal Disorders:  Yes


Musculoskeletal Disorders:  Osteoporosis





Endocrine


Hx Endocrine Disorders:  Yes


Endocrine Disorders:  Hypothyroidsim





HEENT


HX ENT Disorders:  Yes (cataracts removed bilat)


HEENT Disorders:  Cataract


Loss of Vision:  Denies


Hearing Impairment:  Hard of Hearing





Cancer


Hx Cancer:  Yes


Cancer:  Kidney





Psychosocial


Hx Psychiatric Problems:  No





Integumentary


HX Skin/Integumentary Disorder:  No





Blood Transfusions


Hx Blood Disorders:  No


Adverse Reaction to a Blood Tr:  No





Family Medical History


Family Medial History:  


Alcoholism


  19 FATHER


Arthritis


  19 MOTHER


Diabetes mellitus


  19 MOTHER


Hypertension


  19 MOTHER


Neoplasm (breast cancer)


  19 MOTHER


Osteoporosis


  19 MOTHER





Review of Systems


Time Seen by Provider:  10:02


Constitutional:  Chills, Fever, Malaise, Sweats, Weakness


Eyes:  No: Conjunctivae inflammation, Eyelid inflammation, Other, Pain, Redness

, Vision change


ENT:  No: Ear discharge, Ear pain, Mouth pain, Mouth swelling, Nose congestion, 

Nose discharge, Nose pain, Other, Throat pain, Throat swelling


Respiratory:  Cough, Dry, SOB with excertion, Shortness of breath, No: 

Hemoptysis


Cardiovascular:  Lt Headedness, Paroxysmal Noc. Dyspnea, No: Chest Pain, Edema, 

Orthopnea, Other, Palpitations


Gastrointestinal:  No: Abdominal Pain, Constipation, Diarrhea, Hematochezia, 

Melena, Nausea, Other, Vomiting


Genitourinary:  No Dysuria, No Frequency, No Incontinence, No Hematuria, No 

Retention, No Other


Neurological:  Confusion, Incoordination, Weakness





Exam


Exam





Vital Signs








  Date Time  Temp Pulse Resp B/P (MAP) Pulse Ox O2 Delivery O2 Flow Rate FiO2


 


17 06:57     92 Nasal Cannula 5.00 


 


17 04:00 97.7 82 20 145/65 95 Nasal Cannula 5.00 


 


17 01:50     94 Nasal Cannula 5.00 


 


17 00:00 98.7 87 21 135/61 93 Nasal Cannula 5.00 


 


17 21:47     94 Nasal Cannula 5.00 


 


17 21:00      Nasal Cannula 5.00 


 


17 20:00 96.7 79 20 131/58 92 Nasal Cannula  


 


17 19:10     92 Nasal Cannula 5.00 


 


17 16:00 97.7 75 20 136/63 93 Nasal Cannula  


 


17 14:15     92 Nasal Cannula 5.00 


 


17 13:00  70      


 


17 12:00 96.3 75 22 138/80 91 Nasal Cannula  


 


17 10:04     91 Nasal Cannula 5.00 


 


17 09:00      Nasal Cannula 5.00 














I & O 


 


 17





 07:00


 


Intake Total 2980 ml


 


Output Total 125 ml


 


Balance 2855 ml








General Appearance:  No Apparent Distress, Chronically ill, Thin


HEENT:  PERRL/EOMI, Normal ENT Inspection, Pharynx Normal


Neck:  Normal Inspection


Respiratory:  No Accessory Muscle Use, No Respiratory Distress, Other (in rales 

on the left with diminished breath sounds no rales noted on the right.  No 

wheezing appreciated.)


Cardiovascular:  Regular Rate, Rhythm, No Edema, No Gallop, No JVD, No Murmur, 

Normal Peripheral Pulses


Capillary Refill:  Less Than 3 Seconds


Extremity:  Normal Capillary Refill, Normal Inspection, Normal Range of Motion, 

Non Tender, No Calf Tenderness, No Pedal Edema


Neurologic/Psychiatric:  Alert, Oriented x3, No Motor/Sensory Deficits, Normal 

Mood/Affect, CNs II-XII Norm as Tested


Skin:  Normal Color, Warm/Dry





Results


Lab


Laboratory Tests


17 23:40








17 05:25











Assessment/Plan


Assessment/Plan


CAP with sepsis 


   -Continue Abx


   -pan cultures negative thus far


   - CT without contrast of chest


   


Hx of renal cell carcinoma s/p R nephrectomy 


 








254





Clinical Quality Measures


DVT/VTE Risk/Contraindication:


Risk Factor Score Per Nursin


RFS Level Per Nursing on Admit:  4+=Very High











TIO TORIBIO DO 2017 08:54

## 2017-07-31 NOTE — DIAGNOSTIC IMAGING REPORT
PROCEDURE: CT chest without contrast.



TECHNIQUE: Multiple contiguous axial images were obtained through

the chest without the use of intravenous contrast.



INDICATION:  Shortness of breath, decreased oxygen saturation.



CORRELATION STUDY: CT chest 8/26/2016. Chest radiograph

7/30/2017.



FINDINGS: 



There is a complete occlusion of the right lower lobe and middle

lobe bronchus at their proximal aspect. This is changed from

prior imaging. There is consolidation distal to these areas

particularly at the medial aspect of the right middle lobe. More

peripheral consolidation in the right lower lobe with air

bronchograms also present. A 2.2 cm hyperdense mass is noted in

the right infrahilar region is generally stable. Bilateral

pleural effusions right greater than left. There is suggestion of

some asymmetric increased density of the right pleural effusion.

Likely minimal dependent atelectasis in the left lower lobe.

Remaining lung fields with advanced emphysematous lung disease.



Evaluation of the mediastinal structures is limited given lack of

intravenous contrast. No definitive pathologically enlarged

mediastinal lymph nodes. Prominent calcification of the wall of

the thoracic aorta. Heart size within normal limits. There is

presence of coronary artery calcification with calcification of

the aortic valve. EG junction shows a very small hiatal hernia.



Portal venous structures are mildly prominent.



IMPRESSION: 

1. Significant bronchial occlusion of the right lower lobe and

middle lobe bronchi with likely postobstructive pneumonia.

Features favor probable impacted secretions. Possibility of a

intraluminal mass lesion however cannot be excluded. Followup

imaging evaluation is recommended.

2. Bilateral pleural effusions right greater than left. There is

some increased density noted about the pleural effusion,

particularly on the right. Possibility of some debris or less

likely blood could give this appearance. Nodularity associated

with malignancy would also be considered less likely.



Dictated by: 



  Dictated on workstation # [id number]

## 2017-07-31 NOTE — PROGRESS NOTE-HOSPITALIST
Standard Progress Note


Progress Notes/Assess & Plan


Date Seen


7/31/17


Time Seen by Provider:  11:10


Diagnosis


1.  Atypical pneumonia most likely with acute exacerbation of COPD.  Continue 

broad-spectrum antibiotic coverage.


2.  Patient meets criteria for sepsis not severe.


3.  Likely stage III chronic renal disease.


Assess & Plan/Chief Complaint


The patient is an 86-year-old white female known to me from previous visit.  

She presented to the emergency room with complaints of increasing shortness of 

breath.  She was found to have a 101.7 temperature at admission with a 14,000 

white count.  The chest x-ray showed an increase, so the right hilum and what 

appeared to be greater abnormality in the minor fissure as compared to a June 

chest x-ray.  This was felt to be compatible with pneumonia.  She is now 

afebrile.  She reports that she is breathing more easily.  It is noted that she 

continues to smoke despite increasing lung disease.  She also shared with me 

that at her age she would just like to go to sleep one night and not wake up in 

the morning.





Physical exam: The patient appears her stated age.  Lungs show shallow 

respirations and distant breath sounds.  CV was regular without murmur.  

Extremities showed no pedal edema.





Impression: Right-sided pneumonia.  2.COPD in exacerbation.  3.renal 

insufficiency.





Plan: Continue present IV antibiotics.  She will be up for consideration of 

discharge tomorrow.


Labs


Laboratory Tests


7/29/17 23:40








7/30/17 05:25








7/31/17 09:55




















LARY ODOM MD Jul 31, 2017 11:15

## 2017-08-01 VITALS — SYSTOLIC BLOOD PRESSURE: 150 MMHG | DIASTOLIC BLOOD PRESSURE: 67 MMHG

## 2017-08-01 VITALS — SYSTOLIC BLOOD PRESSURE: 151 MMHG | DIASTOLIC BLOOD PRESSURE: 68 MMHG

## 2017-08-01 VITALS — SYSTOLIC BLOOD PRESSURE: 146 MMHG | DIASTOLIC BLOOD PRESSURE: 66 MMHG

## 2017-08-01 LAB
ANION GAP SERPL CALC-SCNC: 9 MMOL/L (ref 5–14)
BASOPHILS # BLD AUTO: 0 10^3/UL (ref 0–0.1)
BASOPHILS NFR BLD AUTO: 0 % (ref 0–10)
BUN SERPL-MCNC: 23 MG/DL (ref 7–18)
BUN/CREAT SERPL: 20
CALCIUM SERPL-MCNC: 8.6 MG/DL (ref 8.5–10.1)
CHLORIDE SERPL-SCNC: 111 MMOL/L (ref 98–107)
CO2 SERPL-SCNC: 23 MMOL/L (ref 21–32)
CREAT SERPL-MCNC: 1.17 MG/DL (ref 0.6–1.3)
EOSINOPHIL # BLD AUTO: 0 10^3/UL (ref 0–0.3)
EOSINOPHIL NFR BLD AUTO: 0 % (ref 0–10)
ERYTHROCYTE [DISTWIDTH] IN BLOOD BY AUTOMATED COUNT: 14.3 % (ref 10–14.5)
GFR SERPLBLD BASED ON 1.73 SQ M-ARVRAT: 44 ML/MIN
GLUCOSE SERPL-MCNC: 135 MG/DL (ref 70–105)
LYMPHOCYTES # BLD AUTO: 0.5 X 10^3 (ref 1–4)
LYMPHOCYTES NFR BLD AUTO: 4 % (ref 12–44)
MAGNESIUM SERPL-MCNC: 1.6 MG/DL (ref 1.8–2.4)
MCH RBC QN AUTO: 28 PG (ref 25–34)
MCHC RBC AUTO-ENTMCNC: 31 G/DL (ref 32–36)
MCV RBC AUTO: 90 FL (ref 80–99)
MONOCYTES # BLD AUTO: 0.4 X 10^3 (ref 0–1)
MONOCYTES NFR BLD AUTO: 3 % (ref 0–12)
NEUTROPHILS # BLD AUTO: 10.9 X 10^3 (ref 1.8–7.8)
NEUTROPHILS NFR BLD AUTO: 93 % (ref 42–75)
PLATELET # BLD: 241 10^3/UL (ref 130–400)
PMV BLD AUTO: 11.8 FL (ref 7.4–10.4)
POTASSIUM SERPL-SCNC: 4 MMOL/L (ref 3.6–5)
RBC # BLD AUTO: 4.02 10^6/UL (ref 4.35–5.85)
SODIUM SERPL-SCNC: 143 MMOL/L (ref 135–145)
WBC # BLD AUTO: 11.8 10^3/UL (ref 4.3–11)

## 2017-08-01 RX ADMIN — ATENOLOL SCH MG: 25 TABLET ORAL at 11:12

## 2017-08-01 RX ADMIN — IPRATROPIUM BROMIDE AND ALBUTEROL SULFATE SCH ML: .5; 3 SOLUTION RESPIRATORY (INHALATION) at 02:18

## 2017-08-01 RX ADMIN — METHYLPREDNISOLONE SODIUM SUCCINATE SCH MG: 40 INJECTION, POWDER, FOR SOLUTION INTRAMUSCULAR; INTRAVENOUS at 00:06

## 2017-08-01 RX ADMIN — IPRATROPIUM BROMIDE AND ALBUTEROL SULFATE SCH ML: .5; 3 SOLUTION RESPIRATORY (INHALATION) at 10:44

## 2017-08-01 RX ADMIN — Medication SCH MG: at 06:50

## 2017-08-01 RX ADMIN — METHYLPREDNISOLONE SODIUM SUCCINATE SCH MG: 40 INJECTION, POWDER, FOR SOLUTION INTRAMUSCULAR; INTRAVENOUS at 05:18

## 2017-08-01 RX ADMIN — ALPRAZOLAM SCH MG: 0.25 TABLET ORAL at 23:24

## 2017-08-01 RX ADMIN — METHYLPREDNISOLONE SODIUM SUCCINATE SCH MG: 40 INJECTION, POWDER, FOR SOLUTION INTRAMUSCULAR; INTRAVENOUS at 18:00

## 2017-08-01 RX ADMIN — SODIUM CHLORIDE SCH MLS/HR: 900 INJECTION INTRAVENOUS at 23:25

## 2017-08-01 RX ADMIN — SODIUM CHLORIDE SCH MLS/HR: 900 INJECTION, SOLUTION INTRAVENOUS at 14:17

## 2017-08-01 RX ADMIN — LEVOTHYROXINE SODIUM SCH MCG: 0.09 TABLET ORAL at 11:05

## 2017-08-01 RX ADMIN — IPRATROPIUM BROMIDE AND ALBUTEROL SULFATE SCH ML: .5; 3 SOLUTION RESPIRATORY (INHALATION) at 18:45

## 2017-08-01 RX ADMIN — IPRATROPIUM BROMIDE AND ALBUTEROL SULFATE SCH ML: .5; 3 SOLUTION RESPIRATORY (INHALATION) at 14:36

## 2017-08-01 RX ADMIN — METHYLPREDNISOLONE SODIUM SUCCINATE SCH MG: 40 INJECTION, POWDER, FOR SOLUTION INTRAMUSCULAR; INTRAVENOUS at 11:56

## 2017-08-01 RX ADMIN — SODIUM CHLORIDE SCH MLS/HR: 900 INJECTION INTRAVENOUS at 07:42

## 2017-08-01 RX ADMIN — AMLODIPINE BESYLATE SCH MG: 5 TABLET ORAL at 11:06

## 2017-08-01 RX ADMIN — IPRATROPIUM BROMIDE AND ALBUTEROL SULFATE SCH ML: .5; 3 SOLUTION RESPIRATORY (INHALATION) at 06:31

## 2017-08-01 RX ADMIN — IPRATROPIUM BROMIDE AND ALBUTEROL SULFATE SCH ML: .5; 3 SOLUTION RESPIRATORY (INHALATION) at 22:24

## 2017-08-01 RX ADMIN — LEVOFLOXACIN SCH MLS/HR: 750 INJECTION, SOLUTION INTRAVENOUS at 04:51

## 2017-08-01 RX ADMIN — PANTOPRAZOLE SODIUM SCH MG: 40 TABLET, DELAYED RELEASE ORAL at 11:12

## 2017-08-01 RX ADMIN — SODIUM CHLORIDE SCH MLS/HR: 900 INJECTION INTRAVENOUS at 15:02

## 2017-08-01 RX ADMIN — NICOTINE SCH MG: 14 PATCH, EXTENDED RELEASE TRANSDERMAL at 11:12

## 2017-08-01 RX ADMIN — SODIUM CHLORIDE SCH MLS/HR: 900 INJECTION, SOLUTION INTRAVENOUS at 23:00

## 2017-08-01 NOTE — PULMONARY PROGRESS NOTE
Subjective


Time Seen by Provider:  07:38





Exam


Exam





Vital Signs








  Date Time  Temp Pulse Resp B/P (MAP) Pulse Ox O2 Delivery O2 Flow Rate FiO2


 


17 07:03     92 Nasal Cannula 7.00 


 


17 06:31     87 Nasal Cannula 5.00 


 


17 02:18     92 Nasal Cannula 5.00 


 


17 00:00 97.2 83 22 150/67 96 Nasal Cannula 5.00 


 


17 22:14     92 Nasal Cannula 5.00 


 


17 21:00     93 Nasal Cannula 5.00 


 


17 20:00 99.1 83 20 150/69 92 Nasal Cannula 5.00 


 


17 18:23     92 Nasal Cannula 5.00 


 


17 16:00 97.3 85 24 143/66 90 Nasal Cannula 5.00 


 


17 14:06     93 Nasal Cannula 5.00 


 


17 12:00 97.8 79 18 143/67 93 Nasal Cannula 5.00 


 


17 10:31     92 Nasal Cannula 5.00 


 


17 09:00     93 Nasal Cannula 5.00 


 


17 08:30 97.8 92 20 132/63 91 Nasal Cannula 5.00 














I & O 


 


 17





 07:00


 


Intake Total 1610 ml


 


Output Total 500 ml


 


Balance 1110 ml








General Appearance:  No Apparent Distress, Chronically ill, Thin


HEENT:  PERRL/EOMI, Normal ENT Inspection, Pharynx Normal


Neck:  Normal Inspection


Respiratory:  No Accessory Muscle Use, No Respiratory Distress, Other (in rales 

on the left with diminished breath sounds no rales noted on the right.  No 

wheezing appreciated.)


Cardiovascular:  Regular Rate, Rhythm, No Edema, No Gallop, No JVD, No Murmur, 

Normal Peripheral Pulses


Capillary Refill:  Less Than 3 Seconds


Extremity:  Normal Capillary Refill, Normal Inspection, Normal Range of Motion, 

Non Tender, No Calf Tenderness, No Pedal Edema


Neurologic/Psychiatric:  Alert, Oriented x3, No Motor/Sensory Deficits, Normal 

Mood/Affect, CNs II-XII Norm as Tested


Skin:  Normal Color, Warm/Dry





Results


Lab


Laboratory Tests


17 09:55








17 05:30











Assessment/Plan


Assessment/Plan


CAP with sepsis r/o endobronchial mass with post obstructive pneumonia 


   -Continue Abx


   -pan cultures negative thus far


   - CT without contrast of chest- reviewed and shows bronchial occlusions mass 

vs secretions. 


Tobacco dependance 


   -education 


   -nicotine patch 


   


Hx of renal cell carcinoma s/p R nephrectomy 


 








232





Clinical Quality Measures


DVT/VTE Risk/Contraindication:


Risk Factor Score Per Nursin


RFS Level Per Nursing on Admit:  4+=Very High











TIO TORIBIO DO Aug 1, 2017 07:41

## 2017-08-01 NOTE — PROGRESS NOTE-HOSPITALIST
Standard Progress Note


Progress Notes/Assess & Plan


Date Seen


8/1/17


Time Seen by Provider:  13:31


Diagnosis


1.  Atypical pneumonia most likely with acute exacerbation of COPD.  Continue 

broad-spectrum antibiotic coverage.


2.  Patient meets criteria for sepsis not severe.


3.  Likely stage III chronic renal disease.


Assess & Plan/Chief Complaint


The patient had a CT scan of chest and abdomen.  The chest revealed a mass in 

the right at the level of the mid to lower lobe bronchus.  Reserve evidence of 

a postobstructive pneumonia.  She has a long and still active history of 

smoking.  In addition she had a renal cell carcinoma resected at  last fall.  

It is therefore not known if malignant with the most likely cell type would be.

  Dr. Snyder plans a bronchoscopy in the morning.  In discussion the patient 

states that if that does not lead to any answers she would be unwilling to have 

any surgical attempt at identification.  She reports she continues to cough.  

She is afebrile.





Physical exam: She appears in no distress.  She has an occasional modest cough 

with rattle.  Lungs show distant breath sounds.  CV is regular.





Impression: Right lung mass strongly suggestive of malignancy with 

postobstructive pneumonia.  2.COPD/tobaccoism.  3.past history of renal cell 

carcinoma resected fall of 16





Plan: Continue present treatment.  Await results of Dr. Snyder's bronchoscopy.


Labs


Laboratory Tests


7/31/17 09:55








8/1/17 05:30




















LARY ODOM MD Aug 1, 2017 13:37

## 2017-08-02 VITALS — DIASTOLIC BLOOD PRESSURE: 72 MMHG | SYSTOLIC BLOOD PRESSURE: 168 MMHG

## 2017-08-02 VITALS — SYSTOLIC BLOOD PRESSURE: 196 MMHG | DIASTOLIC BLOOD PRESSURE: 89 MMHG

## 2017-08-02 VITALS — SYSTOLIC BLOOD PRESSURE: 180 MMHG | DIASTOLIC BLOOD PRESSURE: 81 MMHG

## 2017-08-02 VITALS — DIASTOLIC BLOOD PRESSURE: 92 MMHG | SYSTOLIC BLOOD PRESSURE: 186 MMHG

## 2017-08-02 VITALS — SYSTOLIC BLOOD PRESSURE: 193 MMHG | DIASTOLIC BLOOD PRESSURE: 91 MMHG

## 2017-08-02 LAB
ANION GAP SERPL CALC-SCNC: 12 MMOL/L (ref 5–14)
BASOPHILS # BLD AUTO: 0 10^3/UL (ref 0–0.1)
BASOPHILS NFR BLD AUTO: 0 % (ref 0–10)
BUN SERPL-MCNC: 19 MG/DL (ref 7–18)
BUN/CREAT SERPL: 20
CALCIUM SERPL-MCNC: 8.6 MG/DL (ref 8.5–10.1)
CHLORIDE SERPL-SCNC: 109 MMOL/L (ref 98–107)
CO2 SERPL-SCNC: 23 MMOL/L (ref 21–32)
CREAT SERPL-MCNC: 0.97 MG/DL (ref 0.6–1.3)
EOSINOPHIL # BLD AUTO: 0 10^3/UL (ref 0–0.3)
EOSINOPHIL NFR BLD AUTO: 0 % (ref 0–10)
ERYTHROCYTE [DISTWIDTH] IN BLOOD BY AUTOMATED COUNT: 14.1 % (ref 10–14.5)
GFR SERPLBLD BASED ON 1.73 SQ M-ARVRAT: 54 ML/MIN
GLUCOSE SERPL-MCNC: 118 MG/DL (ref 70–105)
LYMPHOCYTES # BLD AUTO: 0.4 X 10^3 (ref 1–4)
LYMPHOCYTES NFR BLD AUTO: 5 % (ref 12–44)
MAGNESIUM SERPL-MCNC: 1.4 MG/DL (ref 1.8–2.4)
MCH RBC QN AUTO: 28 PG (ref 25–34)
MCHC RBC AUTO-ENTMCNC: 32 G/DL (ref 32–36)
MCV RBC AUTO: 88 FL (ref 80–99)
MONOCYTES # BLD AUTO: 0.4 X 10^3 (ref 0–1)
MONOCYTES NFR BLD AUTO: 4 % (ref 0–12)
NEUTROPHILS # BLD AUTO: 8 X 10^3 (ref 1.8–7.8)
NEUTROPHILS NFR BLD AUTO: 91 % (ref 42–75)
PLATELET # BLD: 253 10^3/UL (ref 130–400)
PMV BLD AUTO: 11.1 FL (ref 7.4–10.4)
POTASSIUM SERPL-SCNC: 3.5 MMOL/L (ref 3.6–5)
RBC # BLD AUTO: 4.14 10^6/UL (ref 4.35–5.85)
SODIUM SERPL-SCNC: 144 MMOL/L (ref 135–145)
WBC # BLD AUTO: 8.9 10^3/UL (ref 4.3–11)

## 2017-08-02 PROCEDURE — 0B978ZX DRAINAGE OF LEFT MAIN BRONCHUS, VIA NATURAL OR ARTIFICIAL OPENING ENDOSCOPIC, DIAGNOSTIC: ICD-10-PCS | Performed by: INTERNAL MEDICINE

## 2017-08-02 PROCEDURE — 0B938ZX DRAINAGE OF RIGHT MAIN BRONCHUS, VIA NATURAL OR ARTIFICIAL OPENING ENDOSCOPIC, DIAGNOSTIC: ICD-10-PCS | Performed by: INTERNAL MEDICINE

## 2017-08-02 RX ADMIN — SODIUM CHLORIDE SCH MLS/HR: 900 INJECTION INTRAVENOUS at 09:38

## 2017-08-02 RX ADMIN — IPRATROPIUM BROMIDE AND ALBUTEROL SULFATE SCH ML: .5; 3 SOLUTION RESPIRATORY (INHALATION) at 10:17

## 2017-08-02 RX ADMIN — ALPRAZOLAM SCH MG: 0.25 TABLET ORAL at 22:40

## 2017-08-02 RX ADMIN — Medication SCH MG: at 09:37

## 2017-08-02 RX ADMIN — PANTOPRAZOLE SODIUM SCH MG: 40 TABLET, DELAYED RELEASE ORAL at 09:37

## 2017-08-02 RX ADMIN — IPRATROPIUM BROMIDE AND ALBUTEROL SULFATE SCH ML: .5; 3 SOLUTION RESPIRATORY (INHALATION) at 13:51

## 2017-08-02 RX ADMIN — IPRATROPIUM BROMIDE AND ALBUTEROL SULFATE SCH ML: .5; 3 SOLUTION RESPIRATORY (INHALATION) at 07:07

## 2017-08-02 RX ADMIN — IPRATROPIUM BROMIDE AND ALBUTEROL SULFATE SCH ML: .5; 3 SOLUTION RESPIRATORY (INHALATION) at 02:57

## 2017-08-02 RX ADMIN — AMLODIPINE BESYLATE SCH MG: 5 TABLET ORAL at 09:37

## 2017-08-02 RX ADMIN — METHYLPREDNISOLONE SODIUM SUCCINATE SCH MG: 40 INJECTION, POWDER, FOR SOLUTION INTRAMUSCULAR; INTRAVENOUS at 01:34

## 2017-08-02 RX ADMIN — SODIUM CHLORIDE SCH MLS/HR: 900 INJECTION INTRAVENOUS at 23:01

## 2017-08-02 RX ADMIN — LEVOTHYROXINE SODIUM SCH MCG: 0.09 TABLET ORAL at 09:37

## 2017-08-02 RX ADMIN — METHYLPREDNISOLONE SODIUM SUCCINATE SCH MG: 40 INJECTION, POWDER, FOR SOLUTION INTRAMUSCULAR; INTRAVENOUS at 18:26

## 2017-08-02 RX ADMIN — METHYLPREDNISOLONE SODIUM SUCCINATE SCH MG: 40 INJECTION, POWDER, FOR SOLUTION INTRAMUSCULAR; INTRAVENOUS at 23:01

## 2017-08-02 RX ADMIN — NICOTINE SCH MG: 14 PATCH, EXTENDED RELEASE TRANSDERMAL at 09:38

## 2017-08-02 RX ADMIN — METHYLPREDNISOLONE SODIUM SUCCINATE SCH MG: 40 INJECTION, POWDER, FOR SOLUTION INTRAMUSCULAR; INTRAVENOUS at 12:20

## 2017-08-02 RX ADMIN — METHYLPREDNISOLONE SODIUM SUCCINATE SCH MG: 40 INJECTION, POWDER, FOR SOLUTION INTRAMUSCULAR; INTRAVENOUS at 07:19

## 2017-08-02 RX ADMIN — SODIUM CHLORIDE SCH MLS/HR: 900 INJECTION, SOLUTION INTRAVENOUS at 07:19

## 2017-08-02 RX ADMIN — IPRATROPIUM BROMIDE AND ALBUTEROL SULFATE SCH ML: .5; 3 SOLUTION RESPIRATORY (INHALATION) at 19:21

## 2017-08-02 RX ADMIN — ATENOLOL SCH MG: 25 TABLET ORAL at 09:37

## 2017-08-02 RX ADMIN — SODIUM CHLORIDE SCH MLS/HR: 900 INJECTION INTRAVENOUS at 15:17

## 2017-08-02 RX ADMIN — IPRATROPIUM BROMIDE AND ALBUTEROL SULFATE SCH ML: .5; 3 SOLUTION RESPIRATORY (INHALATION) at 21:02

## 2017-08-02 NOTE — PULMONARY PROCEDURES
Pulmonary Procedures


Date of Procedure


Date of Service:  Aug 2, 2017


Bronch


Bronchoscopy with transbronchial washes 





Preop DX mucous plugging r/o endobronchial mass 





Postop DX: mucous plugging no endobronchial mass noted





Complications: Procedure stopped early secondary to hypoxia and intolerance to 

procedure. 





After informed consent obtained and formal time out pt was sedated per 

anesthesia. Bronchoscope was advanced through the nare and vocal cords.  1% 

lidocaine was used to anesthetize vocal cords, epiglottis, darío, and left/

right main stem bronchus.  An anatomical tour was undertaken down to the 

segmental bronchi bilaterally. No endobronchial lesions noted. bilateral 

bronchial wash was obtained.  Procedure stopped early secondary to hypoxia and 

intolerance to procedure.  Stat CXR is pending.











TIO TORIBIO DO Aug 2, 2017 09:44

## 2017-08-02 NOTE — PROGRESS NOTE-HOSPITALIST
Progress Note


HPI/CC on Admission


Mrs. Herring is a pleasant 86-year-old white female who reports that she was 

feeling in her usual state of health until Thursday.  Posterior 86 birthday and 

she was out playing bingo.  She felt fatigued while there but then developed 

Reiger's with chills.  Following that she had increase in her baseline cough 

that was nonproductive.  She had no further Reiger's but continued to feel 

poorly with increased cough and progressive shortness of breath.  Reportedly 

her O2 saturations on 3 L at home were down in the 70 percent when she arrived 

in the emergency room and was in significant respiratory distress.  After 

breathing treatments and Solu-Medrol her status improved.  Right perihilar 

infiltrate was reported by the urgency room physician and she was admitted on 

the pneumonia protocol.  She continues to smoke about half a pack of cigarettes 

per day and due to her advanced age and known lung disease requested DO NOT 

RESUSCITATE status which is been initiated.  Upon my arrival she was eating 

breakfast and feeling significantly better denying shortness of breath at rest 

with nonproductive cough.  She denies chest pain and was alert and oriented.





Chart Review:


No fever


Vitals stable


CXR today bilateral basal infiltrates





Dr. Snyder Review:


Pt had a thick mucus plug. Secretions were suctioned


Cancer was suspected but results not yet known


Ct indicated endobronchial mass


Previous nephrectomy


Cytology will be negative


Pt will need repeat CT as out pt





Dr. Garrido Review:


Pt states that she would be okay to die in her sleep


Pt still smokes





SW Review:


Dr. Snyder ordered CT chest and it looked terrible


Bronc was performed by Dr. Snyder this am





Patient Interview:


Pt confirms still feeling drowsy


Pt denies experiencing pain


Pt confirms wearing O2 at night


Pt confirms Dr. Rai as PCP


Pt states that she lives at home alone. Pt lives very close to Dr. Rai's 

clinic


Physical exam stable


Pt states that she works at [employer]. She has been there for several years. Pt 

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


Pt states that she should eat but would like to rest





Plan:


Ambulate


SW consult





Scribed by Marcia Keith under the direct supervision of Dr. Salgado.





Progress Notes/Assess & Plan


Date Seen


8/2/17


Time Seen by Provider:  10:00


Diagonsis/Assessment & Plan


Chart Review:


No fever


Vitals stable


CXR today bilateral basal infiltrates





Dr. Snyder Review:


Pt had a thick mucus plug. Secretions were suctioned


Cancer was suspected but results not yet known


Ct indicated endobronchial mass


Previous nephrectomy


Cytology will be negative since it only appeared to be secretions


Pt will need repeat CT as out pt





Dr. Garrido Review:


Pt states that she would be okay to die in her sleep


Pt still smokes





SW Review:


Dr. Snyder ordered CT chest and it looked terrible


Bronc was performed by Dr. Snyder this am





Patient Interview:


Pt confirms still feeling drowsy


Pt denies experiencing pain


Pt confirms wearing O2 only at night


Pt confirms Dr. Rai as PCP


Pt states that she lives at home alone. Pt lives very close to Dr. Rai's 

clinic


Physical exam stable


Pt states that she works at [employer]. She has been there for several years. Pt 

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


Pt states that she should eat but would like to rest





AFVSS, Pleasant, O x 3


RRR, CTAB decreased BS in bases


no edema





Laboratory Tests


8/2/17 05:30

















Assessment: 


Right lung mass strongly suggestive of malignancy with postobstructive 

pneumonia on abx and bronchoscopy results pending  


COPD/tobaccoism


Chronic hypoxia on O2 at night


Past history of renal cell carcinoma resected fall of 2016





Plan:


Ambulate


SW consult since she lives at home alone


Await bronch  results





Scribed by Marcia Keith under the direct supervision of Dr. Salgado.











LAILA SALGADO DO Aug 2, 2017 10:56

## 2017-08-02 NOTE — PULMONARY PROGRESS NOTE
Subjective


Time Seen by Provider:  09:50


Subjective/Events-last exam


Pt still c/o SOB and nonproductive cough





Exam


Exam





Vital Signs








  Date Time  Temp Pulse Resp B/P (MAP) Pulse Ox O2 Delivery O2 Flow Rate FiO2


 


17 08:10   20     


 


17 07:40      Nasal Cannula 4.00 


 


17 07:07     91 Nasal Cannula 7.00 


 


17 02:57     87 Nasal Cannula 7.00 


 


17 00:25 97.8 84 20 168/72 93 Nasal Cannula 7.00 


 


17 22:24     95 Nasal Cannula 7.00 


 


17 21:00      Nasal Cannula 7.00 


 


17 16:24 98.0 78 20 151/68 94 Nasal Cannula 7.00 


 


17 14:36     92 Nasal Cannula 7.00 


 


17 10:45     92 Nasal Cannula 7.00 














I & O 


 


 17





 07:00


 


Intake Total 930 ml


 


Output Total 200 ml


 


Balance 730 ml








General Appearance:  No Apparent Distress, Chronically ill, Thin


HEENT:  PERRL/EOMI, Normal ENT Inspection, Pharynx Normal


Neck:  Normal Inspection


Respiratory:  No Accessory Muscle Use, No Respiratory Distress, Other (in rales 

on the left with diminished breath sounds no rales noted on the right.  No 

wheezing appreciated.)


Cardiovascular:  Regular Rate, Rhythm, No Edema, No Gallop, No JVD, No Murmur, 

Normal Peripheral Pulses


Capillary Refill:  Less Than 3 Seconds


Extremity:  Normal Capillary Refill, Normal Inspection, Normal Range of Motion, 

Non Tender, No Calf Tenderness, No Pedal Edema


Neurologic/Psychiatric:  Alert, Oriented x3, No Motor/Sensory Deficits, Normal 

Mood/Affect, CNs II-XII Norm as Tested


Skin:  Normal Color, Warm/Dry





Results


Lab


Laboratory Tests


17 09:55








17 05:30








17 05:30











Assessment/Plan


Assessment/Plan


CAP with sepsis with mucous plugging s/p bronchoscopy  -- no endobronchial 

lesion noted


   -Continue Abx


   -pan cultures negative thus far


   - CT without contrast of chest- repeat as an out patient 


Tobacco dependance 


   -education 


   -nicotine patch 


   


Hx of renal cell carcinoma s/p R nephrectomy 


 








232





Clinical Quality Measures


DVT/VTE Risk/Contraindication:


Risk Factor Score Per Nursin


RFS Level Per Nursing on Admit:  4+=Very High











TIO TORIBIO DO Aug 2, 2017 09:50

## 2017-08-02 NOTE — DIAGNOSTIC IMAGING REPORT
EXAMINATION:

Portable supine radiograph of the chest.

 

INDICATION: 

Post bronchoscopy evaluation.



FINDINGS:

There are right infrahilar and left basilar infiltrates or

atelectasis. There is background COPD. The heart size is mildly

enlarged. There is suggestion of a small left effusion. No

pneumothorax.



IMPRESSION: 

Right infrahilar and left basilar infiltrates or atelectasis.



Dictated by: 



  Dictated on workstation # [id number]

## 2017-08-02 NOTE — PHYSICAL THERAPY EVALUATION
PT Evaluation-General


Medical Diagnosis


Admission Date


Jul 30, 2017 at 01:13


Medical Diagnosis:  sepsis; pneumonia


Onset Date:  Jul 30, 2017





Therapy Diagnosis


Therapy Diagnosis:  weakness





Height/Weight


Height (Feet):  5


Height (Inches):  1.00


Weight (Pounds):  99


Weight (Ounces):  1.0





Precautions


Precautions/Isolations:  Fall Prevention, Standard Precautions





Referral


Physician:  Baldo


Reason for Referral:  Evaluation/Treatment





Medical History


Pertinent Medical History:  Hypothroidism


Additional Medical History


osteoporosis, stage III chronic renal disease, COPD


Current History


Admitted with sepsis/pneumonia and COPD exacerbation.


Reviewed History:  Yes





Social History


Home:  Single Level


Current Living Status:  Alone


Entry Into Home:  Stairs With Railing





Prior/Core FIM


Prior Level of Function


              Functional Karnack Measure


0=Not Assessed/NA   4=Minimal Assistance


1=Total Assistance   5=Supervision or Setup


2=Maximal Assistance   6=Modified Karnack


3=Moderate Assistance   7=Complete Karnack


Bed Mobility:  7


Transfers (B,C,W/C) (FIM):  7


Gait:  7


Lives alone; drives, cares for herself.  Works at [employer]





PT Evaluation-Current


Subjective


"I can't stay here another week."  "I'm not feeble, I just have a bunch of 

wires."





Pain





   Numeric Pain Scale:  0-No Pain


   Location:  No Pain Reported





Objective


Patient Orientation:  Person, Place, Time, Situation


Problem Solving:  Good


Attachments:  Oxygen (in situ during and post treatment; 10l/min), IV





ROM/Strength


ROM Lower Extremities


wNL


Strenght Lower Extremities


grossly 4/5 throughout





Integumentary/Posture


Integumentary


intact


Bowel Incontinence:  No


Bladder Incontinence:  No


Posture


normal and symmetrical





Neuromuscular


(Tone, Coordination, Reflexes)


No noted functional deficits





Sensory


Vision:  Functional


Hearing:  Functional


Hand Dominance:  Right


Sensation Right Lower Extremit:  Intact


Sensation Left Lower Extremity:  Intact





Transfers


              Functional Karnack Measure


0=Not Assessed/NA   4=Minimal Assistance


1=Total Assistance   5=Supervision or Setup


2=Maximal Assistance   6=Modified Karnack


3=Moderate Assistance   7=Complete Karnack


Transfers (B, C, W/C) (FIM):  5


Supine to/from Sit:  5


Sit to/from Stand:  5


Pt did not need any assist with transfers, only supervision as it was the first 

time she got up; assisted pt with managment of IV and oxygen tubing. 


Pt stood and took a few steps to transfer to her chair with SBA and no AD 

required.  Pt up in chair with needs met.





Gait


Anticipated Mode of Locomotion:  Walk





Balance


Sitting Static:  Good


Sitting Dynamic:  Good


Standing Static:  Good


 Standing Dynamic:  Good





Treatment


Pt stood at EOB without AD and performed weight shifting and marching.  Did not 

go further due to being on oxygen at a high rate and wanted to stay near wall 

oxygen unit.





Assessment/Needs


Pt presents with acute hospital stay due to sepsis.  She is moving well, but 

hasn't moved much since admit.  She will benefit from short term PT while 

hospitalized to address strength and functional mobility.  


Eval of moderate intensity due to COPD, sepsis, and renal disease as well as 

living alone; Slight LE weakness, slight transfer assist and oxygen dependent; 

evolving due to current hospital stay.


Rehab Potential:  Good





PT Long Term Goals


Long Term Goals


PT Long Term Goals Time Frame:  Aug 9, 2017


Transfers (B,C,W/C) (FIM):  7


Gait (FIM):  7





PT Plan


Problem List


Problem List:  Activity Tolerance, Functional Strength, Safety, Gait, Transfer





Treatment/Plan


Treatment Plan:  Continue Plan of Care


Treatment Plan:  Bed Mobility, Functional Activity Jennifer, Functional Strength, 

Gait, Safety, Therapeutic Exercise, Transfers


Treatment Duration:  Aug 9, 2017


Frequency:  5 times per week


Estimated Hrs Per Day:  .25 hour per day


Patient and/or Family Agrees t:  Yes





Safety Risks/Education


Patient Education:  Transfer Techniques, Safety Issues


Teaching Recipient:  Patient


Teaching Methods:  Demonstration, Discussion


Response to Teaching:  Reinforcement Needed





Time/GCodes


Time In:  1500


Time Out:  1523


Total Billed Treatment Time:  23


Total Billed Treatment


visit


EVM 15


FA 8











RABIA CLARKE PT Aug 2, 2017 16:03

## 2017-08-02 NOTE — OCCUPATIONAL THERAPY EVAL
OT Evaluation-General/PLF


Medical Diagnosis


Admission Date


Jul 30, 2017 at 01:13


Medical Diagnosis:  Sepsis, PNA, COPD, renal insufficiency


Onset Date:  Jul 29, 2017





Therapy Diagnosis


Therapy Diagnosis:  decreased self care





Height/Weight


Height (Feet):  5


Height (Inches):  1.00


Weight (Pounds):  99


Weight (Ounces):  1.0





Precautions


Precautions/Isolations:  Fall Prevention, Standard Precautions


Safety Interventions:  None





Referral


Physician:  Baldo





Medical History


Pertinent Medical History:  COPD, HTN, Hypothroidism, Renal Insufficiency


Additional Medical History


Renal cell carcinoma s/p right nephrectomy, osteoporosis


Reviewed History:  Yes





Social History


Current Living Status:  Alone





ADL-Prior Level of Function


ADL PLOF Comments


Pt reports being independent with self care and mobility. Does her own cooking 

and housework. 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 Pt 

states she is normally very active and she wants to continue.


DME/Equipment Comments


Uses O2 at night (3.5L) and PRN during the day.


Drive Self:  Yes





OT Current Status


Subjective


Pt sitting in chair, agrees to therapy. Pt has no c/o pain. States she wants to 

go home soon.





Mental Status/Objective


Patient Orientation:  Person, Place, Situation


Attachments:  Oxygen





Current


Glasses/Contacts:  Yes


Hearing Aids:  No


Dentures/Partials:  No


Hand Dominance:  Right


Upper Extremity ROM


Grossly WFL


Upper Extremity Coordination


Intact


Upper Extremity Sensation


Intact per pt report


Upper Extremity Strength


Grossly 4/5





ADL-Treatment


ADL-Current


Pt participated in UE assessment while seated.  Pt demonstrated ability to doff/

don socks without assistance while seated. Pt performed sit to stand with 

supervision. Pt states she has been feeding herself and managing containers 

without assistance. Pt does not exhibit shortness of breath during session. Pt 

states she wants to go home as soon as possible. Pt sitting in chair with needs 

met after session.


              Functional Williams Measure


0=Not Assessed/NA   4=Minimal Assistance


1=Total Assistance   5=Supervision or Setup


2=Maximal Assistance   6=Modified Williams


3=Moderate Assistance   7=Complete IndependenceIRFPAI Quality Coding Scale











6 Independent with activity with or without an assistive device


 


5  Patient requires set up or clean up by helper.  Patient completes activity  

by  themselves


 


4 Supervision or touching assist (CGA). [place] provide cues , steadying assist


 


3 The helper provides less than half the effort to complete the activity


 


2 The helper provides more than half the effort to complete the activity


 


1 Dependent.  The helper does all the effort to complete an activity 


 


7 Patient refused to complete or attempt activity


 


9 The patient did not perform the activity before the current illness or injury


 


88 Not attempted due to Medical conditions or safety concerns








Eating (FIM):  6 (by pt report)


Lower Body Dressing (FIM):  5 (socks only)





Education


OT Patient Education:  Rehab process


Teaching Recipient:  Patient


Teaching Methods:  Discussion


Response to Teaching:  Verbalize Understanding





OT Short Term Goals


Short Term Goals


1=Demonstrate adherence to instructed precautions during ADL tasks.


2=Patient will verbalize/demonstrate understanding of assistive devices/

modifications for ADL.


3=Patient will improve strength/tolerance for activity to enable patient to 

perform ADL's.





OT Long Term Goals


Long Term Goals


Time Frame:  Aug 9, 2017


Grooming(FIM):  6


Bathing(FIM):  6


Upper Body Dressing(FIM):  6


Lower Body Dressing(FIM):  6


Toileting(FIM):  6


Toilet/Commode Transfer(FIM):  6


Additional Goals:  2-Verbalize Understanding, 3-ImproveStrength/Jennifer


1=Demonstrate adherence to instructed precautions during ADL tasks.


2=Patient will verbalize/demonstrate understanding of assistive devices/

modifications for ADL.


3=Patient will improve strength/tolerance for activity to enable patient to 

perform ADL's.





OT Education/Plan


Problem List/Assessment


Assessment:  Decreased Activ Tolerance, Dependent Transfers, Impaired Self-Care 

Skills


Pt to benefit from skilled OT intervention for ADL training, transfers, 

strengthening, and home safety education to maximize level of function and 

allow safe return home.





Discharge Recommendations


Plan/Recommendations:  Continue POC





Treatment Plan/Plan of Care


Treatment,Training & Education:  Yes


Patient would benefit from OT for education, treatment and training to promote 

independence in ADL's, mobility, safety and/or upper extremity function for ADL'

s.


Plan of Care:  ADL Retraining, Functional Mobility, UE Funct Exercise/Act


Treatment Duration:  Aug 9, 2017


Frequency:  5 times per week


Estimated Hrs Per Day:  .25 hour per day


Agreement:  Yes


Rehab Potential:  Good





Time/GCodes


Start Time:  15:35


Stop Time:  15:50


Total Time Billed (hr/min):  15


Billed Treatment Time


1 visit, SANAZ(15minutes)











ASH OWENS OT Aug 2, 2017 16:06

## 2017-08-02 NOTE — HISTORY & PHYSICAL-HOSPITALIST
HPI


History of Present Illness:


HPI/Chief Complaint


Mrs. Herring is a pleasant 86-year-old white female who reports that she was 

feeling in her usual state of health until Thursday.  Posterior 86 birthday and 

she was out playing bingo.  She felt fatigued while there but then developed 

Reiger's with chills.  Following that she had increase in her baseline cough 

that was nonproductive.  She had no further Reiger's but continued to feel 

poorly with increased cough and progressive shortness of breath.  Reportedly 

her O2 saturations on 3 L at home were down in the 70 percent when she arrived 

in the emergency room and was in significant respiratory distress.  After 

breathing treatments and Solu-Medrol her status improved.  Right perihilar 

infiltrate was reported by the urgency room physician and she was admitted on 

the pneumonia protocol.  She continues to smoke about half a pack of cigarettes 

per day and due to her advanced age and known lung disease requested DO NOT 

RESUSCITATE status which is been initiated.  Upon my arrival she was eating 

breakfast and feeling significantly better denying shortness of breath at rest 

with nonproductive cough.  She denies chest pain and was alert and oriented.





Chart Review:


No fever


Vitals stable


CXR today bilateral basal infiltrates





Dr. Snyder Review:


Pt had a thick mucus plug. Secretions were suctioned


Cancer was suspected but results not yet known


Ct indicated endobronchial mass


Previous nephrectomy


Cytology will be negative


Pt will need repeat CT as out pt





Dr. Garrido Review:


Pt states that she would be okay to die in her sleep


Pt still smokes





SW Review:


Dr. Snyder ordered CT chest and it looked terrible


[facility] was performed by Dr. Snyder this am





Patient Interview:


Pt confirms still feeling drowsy


Pt denies experiencing pain


Pt confirms wearing O2 at night


Pt confirms Dr. Rai as PCP


Pt states that she lives at home alone. Pt lives very close to Dr. Rai's 

clinic


Physical exam stable


Pt states that she works at [employer]. She has been there for several years. Pt 

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


Pt states that she should eat but would like to rest





Plan:


Ambulate


SW consult





Scribed by Marcia Keith under the direct supervision of Dr. Salgado.


Date Seen


17


Time Seen by Provider:  00:00


Attending Physician


Kit Liu MD


PCP


Cody Rai DO


Referring Physician





Date of Admission


2017 at 01:13





Home Medications & Allergies


Home Medications


Reviewed patient Home Medication Reconciliation Form





Allergies





Allergies


Coded Allergies


  No Known Drug Allergies ([id number])








Past Medical-Social-Family Hx


Patient Social History


Alcohol Use:  Denies Use


Recreational Drug Use:  No


Smoking Status:  Current Everyday Smoker


Type Used:  Cigarettes


Physical Abuse Screen:  No


Sexual Abuse:  No


Recent Foreign Travel:  No


Contact w/other who traveled:  No


Recent Hopitalizations:  Yes


Recent Infectious Disease Expo:  No





Immunizations Up To Date


Date of Pneumonia Vaccine:  2016


Date of Influenza Vaccine:  Sep 1, 2011





Seasonal Allergies


Seasonal Allergies:  No





Surgeries


HX Surgeries:  Yes (RIGHT HIP,HYST.,THYROIDECTOMY 95%, cataract)


Surgeries:  Hysterectomy, Lumpectomy, Orthopedic, Renal (nephrectomy)





Respiratory


Hx Respiratory Disorders:  Yes (COPD)





Cardiovascular


Hx Cardiovascular Disorders:  Yes


Cardiac Disorders:  Hypertension





Neurological


Hx Neurological Disorders:  No





Reproductive System


Pregnant:  No


Hx Reproductive Disorders:  No


Sexually Transmitted Disease:  No


HIV/AIDS:  No


Female Reproductive Disorders:  Denies





Genitourinary


Hx Genitourinary Disorders:  No





Gastrointestinal


Hx Gastrointestinal Disorders:  No





Musculoskeletal


Hx Musculoskeletal Disorders:  Yes


Musculoskeletal Disorders:  Osteoporosis





Endocrine


Hx Endocrine Disorders:  Yes


Endocrine Disorders:  Hypothyroidsim





HEENT


HX ENT Disorders:  Yes (cataracts removed bilat)


HEENT Disorders:  Cataract


Loss of Vision:  Denies


Hearing Impairment:  Hard of Hearing





Cancer


Hx Cancer:  Yes


Cancer:  Kidney





Psychosocial


Hx Psychiatric Problems:  No





Integumentary


HX Skin/Integumentary Disorder:  No





Blood Transfusions


Hx Blood Disorders:  No


Adverse Reaction to a Blood Tr:  No





Family Medical History


Family Hx:  


Alcoholism


  19 FATHER


Arthritis


  19 MOTHER


Diabetes mellitus


  19 MOTHER


Hypertension


  19 MOTHER


Neoplasm (breast cancer)


  19 MOTHER


Osteoporosis


  19 MOTHER





Review of Systems


Constitutional:  no symptoms reported





Physical Exam


Physical Exam


Vital Signs





Vital Sign - Last 12Hours








 17





 23:33


 


Temp 101.7


 


Pulse 88


 


Resp 20


 


B/P (MAP) 133/76


 


Pulse Ox 70


 


O2 Delivery Room Air


 


O2 Flow Rate 5.00





Capillary Refill : Less Than 3 SecondsLess Than 3 Seconds





Results


Results/Procedures


Lab


Laboratory Tests


17 05:30








17 05:30














Assessment/Plan


Admission Diagnosis


1.  Atypical pneumonia most likely with acute exacerbation of COPD.  Continue 

broad-spectrum antibiotic coverage.


2.  Patient meets criteria for sepsis not severe.


3.  Likely stage III chronic renal disease.





Clinical Quality Measures


DVT/VTE Risk/Contraindication:


Risk Factor Score Per Nursin


RFS Level Per Nursing on Admit:  4+=Very High











LAILA SALGADO DO Aug 2, 2017 10:46

## 2017-08-03 VITALS — SYSTOLIC BLOOD PRESSURE: 184 MMHG | DIASTOLIC BLOOD PRESSURE: 93 MMHG

## 2017-08-03 VITALS — DIASTOLIC BLOOD PRESSURE: 92 MMHG | SYSTOLIC BLOOD PRESSURE: 184 MMHG

## 2017-08-03 VITALS — SYSTOLIC BLOOD PRESSURE: 176 MMHG | DIASTOLIC BLOOD PRESSURE: 79 MMHG

## 2017-08-03 VITALS — SYSTOLIC BLOOD PRESSURE: 190 MMHG | DIASTOLIC BLOOD PRESSURE: 80 MMHG

## 2017-08-03 VITALS — SYSTOLIC BLOOD PRESSURE: 179 MMHG | DIASTOLIC BLOOD PRESSURE: 82 MMHG

## 2017-08-03 VITALS — SYSTOLIC BLOOD PRESSURE: 180 MMHG | DIASTOLIC BLOOD PRESSURE: 88 MMHG

## 2017-08-03 VITALS — SYSTOLIC BLOOD PRESSURE: 184 MMHG | DIASTOLIC BLOOD PRESSURE: 92 MMHG

## 2017-08-03 VITALS — DIASTOLIC BLOOD PRESSURE: 93 MMHG | SYSTOLIC BLOOD PRESSURE: 206 MMHG

## 2017-08-03 VITALS — DIASTOLIC BLOOD PRESSURE: 86 MMHG | SYSTOLIC BLOOD PRESSURE: 195 MMHG

## 2017-08-03 LAB
ALBUMIN SERPL-MCNC: 3 GM/DL (ref 3.2–4.5)
ALT SERPL-CCNC: 15 U/L (ref 0–55)
ANION GAP SERPL CALC-SCNC: 9 MMOL/L (ref 5–14)
AST SERPL-CCNC: 16 U/L (ref 5–34)
BASOPHILS # BLD AUTO: 0 10^3/UL (ref 0–0.1)
BASOPHILS NFR BLD AUTO: 0 % (ref 0–10)
BILIRUB SERPL-MCNC: 0.5 MG/DL (ref 0.1–1)
BUN SERPL-MCNC: 25 MG/DL (ref 7–18)
BUN/CREAT SERPL: 21
CALCIUM SERPL-MCNC: 8.3 MG/DL (ref 8.5–10.1)
CHLORIDE SERPL-SCNC: 105 MMOL/L (ref 98–107)
CO2 SERPL-SCNC: 28 MMOL/L (ref 21–32)
CREAT SERPL-MCNC: 1.18 MG/DL (ref 0.6–1.3)
EOSINOPHIL # BLD AUTO: 0 10^3/UL (ref 0–0.3)
EOSINOPHIL NFR BLD AUTO: 0 % (ref 0–10)
ERYTHROCYTE [DISTWIDTH] IN BLOOD BY AUTOMATED COUNT: 13.9 % (ref 10–14.5)
GFR SERPLBLD BASED ON 1.73 SQ M-ARVRAT: 43 ML/MIN
GLUCOSE SERPL-MCNC: 158 MG/DL (ref 70–105)
LYMPHOCYTES # BLD AUTO: 0.5 X 10^3 (ref 1–4)
LYMPHOCYTES NFR BLD AUTO: 8 % (ref 12–44)
MAGNESIUM SERPL-MCNC: 1.3 MG/DL (ref 1.8–2.4)
MCH RBC QN AUTO: 28 PG (ref 25–34)
MCHC RBC AUTO-ENTMCNC: 32 G/DL (ref 32–36)
MCV RBC AUTO: 87 FL (ref 80–99)
MONOCYTES # BLD AUTO: 0.4 X 10^3 (ref 0–1)
MONOCYTES NFR BLD AUTO: 7 % (ref 0–12)
NEUTROPHILS # BLD AUTO: 5.5 X 10^3 (ref 1.8–7.8)
NEUTROPHILS NFR BLD AUTO: 85 % (ref 42–75)
PLATELET # BLD: 261 10^3/UL (ref 130–400)
PMV BLD AUTO: 10.8 FL (ref 7.4–10.4)
POTASSIUM SERPL-SCNC: 3.3 MMOL/L (ref 3.6–5)
PROT SERPL-MCNC: 5.5 GM/DL (ref 6.4–8.2)
RBC # BLD AUTO: 4.16 10^6/UL (ref 4.35–5.85)
SODIUM SERPL-SCNC: 142 MMOL/L (ref 135–145)
WBC # BLD AUTO: 6.4 10^3/UL (ref 4.3–11)

## 2017-08-03 RX ADMIN — CLONIDINE HYDROCHLORIDE PRN MG: 0.1 TABLET ORAL at 17:08

## 2017-08-03 RX ADMIN — POTASSIUM CHLORIDE SCH MEQ: 750 TABLET, FILM COATED, EXTENDED RELEASE ORAL at 09:31

## 2017-08-03 RX ADMIN — IPRATROPIUM BROMIDE AND ALBUTEROL SULFATE SCH ML: .5; 3 SOLUTION RESPIRATORY (INHALATION) at 02:15

## 2017-08-03 RX ADMIN — LACTOBACILLUS TAB SCH TAB.CHEW: TAB at 17:03

## 2017-08-03 RX ADMIN — POTASSIUM CHLORIDE SCH MEQ: 750 TABLET, FILM COATED, EXTENDED RELEASE ORAL at 17:03

## 2017-08-03 RX ADMIN — Medication SCH MG: at 06:08

## 2017-08-03 RX ADMIN — METHYLPREDNISOLONE SODIUM SUCCINATE SCH MG: 40 INJECTION, POWDER, FOR SOLUTION INTRAMUSCULAR; INTRAVENOUS at 06:08

## 2017-08-03 RX ADMIN — IPRATROPIUM BROMIDE AND ALBUTEROL SULFATE SCH ML: .5; 3 SOLUTION RESPIRATORY (INHALATION) at 22:05

## 2017-08-03 RX ADMIN — LEVOTHYROXINE SODIUM SCH MCG: 0.09 TABLET ORAL at 08:44

## 2017-08-03 RX ADMIN — IPRATROPIUM BROMIDE AND ALBUTEROL SULFATE SCH ML: .5; 3 SOLUTION RESPIRATORY (INHALATION) at 18:29

## 2017-08-03 RX ADMIN — METHYLPREDNISOLONE SODIUM SUCCINATE SCH MG: 40 INJECTION, POWDER, FOR SOLUTION INTRAMUSCULAR; INTRAVENOUS at 23:18

## 2017-08-03 RX ADMIN — HYDRALAZINE HYDROCHLORIDE SCH MG: 25 TABLET ORAL at 09:31

## 2017-08-03 RX ADMIN — NICOTINE SCH MG: 14 PATCH, EXTENDED RELEASE TRANSDERMAL at 08:45

## 2017-08-03 RX ADMIN — IPRATROPIUM BROMIDE AND ALBUTEROL SULFATE SCH ML: .5; 3 SOLUTION RESPIRATORY (INHALATION) at 15:26

## 2017-08-03 RX ADMIN — HYDRALAZINE HYDROCHLORIDE SCH MG: 25 TABLET ORAL at 21:02

## 2017-08-03 RX ADMIN — IPRATROPIUM BROMIDE AND ALBUTEROL SULFATE SCH ML: .5; 3 SOLUTION RESPIRATORY (INHALATION) at 07:16

## 2017-08-03 RX ADMIN — HYDRALAZINE HYDROCHLORIDE SCH MG: 25 TABLET ORAL at 12:52

## 2017-08-03 RX ADMIN — PANTOPRAZOLE SODIUM SCH MG: 40 TABLET, DELAYED RELEASE ORAL at 08:44

## 2017-08-03 RX ADMIN — ATENOLOL SCH MG: 25 TABLET ORAL at 08:44

## 2017-08-03 RX ADMIN — CHOLESTYRAMINE SCH GM: 4 POWDER, FOR SUSPENSION ORAL at 21:02

## 2017-08-03 RX ADMIN — ALPRAZOLAM SCH MG: 0.25 TABLET ORAL at 22:36

## 2017-08-03 RX ADMIN — METHYLPREDNISOLONE SODIUM SUCCINATE SCH MG: 40 INJECTION, POWDER, FOR SOLUTION INTRAMUSCULAR; INTRAVENOUS at 17:03

## 2017-08-03 RX ADMIN — LEVOFLOXACIN SCH MLS/HR: 750 INJECTION, SOLUTION INTRAVENOUS at 04:01

## 2017-08-03 RX ADMIN — AMLODIPINE BESYLATE SCH MG: 5 TABLET ORAL at 08:45

## 2017-08-03 RX ADMIN — IPRATROPIUM BROMIDE AND ALBUTEROL SULFATE SCH ML: .5; 3 SOLUTION RESPIRATORY (INHALATION) at 11:13

## 2017-08-03 RX ADMIN — METHYLPREDNISOLONE SODIUM SUCCINATE SCH MG: 40 INJECTION, POWDER, FOR SOLUTION INTRAMUSCULAR; INTRAVENOUS at 12:52

## 2017-08-03 RX ADMIN — CHOLESTYRAMINE SCH GM: 4 POWDER, FOR SUSPENSION ORAL at 12:52

## 2017-08-03 RX ADMIN — SODIUM CHLORIDE SCH MLS/HR: 900 INJECTION INTRAVENOUS at 06:08

## 2017-08-03 RX ADMIN — POTASSIUM CHLORIDE SCH MEQ: 750 TABLET, FILM COATED, EXTENDED RELEASE ORAL at 12:54

## 2017-08-03 NOTE — PULMONARY PROGRESS NOTE
Subjective


Time Seen by Provider:  12:56


Subjective/Events-last exam


No complications noted.





Exam


Exam





Vital Signs








  Date Time  Temp Pulse Resp B/P (MAP) Pulse Ox O2 Delivery O2 Flow Rate FiO2


 


8/3/17 11:13     95 Nasal Cannula 4.50 


 


8/3/17 10:05     96  4.50 


 


8/3/17 09:00      Nasal Cannula 4.50 


 


8/3/17 08:00 98.5 78 32 195/86 94 High Flow N/C 5.00 


 


8/3/17 07:16     93 Nasal Cannula 4.50 


 


8/3/17 04:00    176/79    


 


8/3/17 02:16     93 Nasal Cannula 4.50 


 


8/3/17 00:00 97.8 71 22 179/82 96 High Flow N/C 5.00 


 


17 21:02     90 Nasal Cannula 5.00 


 


17 21:00      Nasal Cannula 5.00 


 


17 20:10  73  186/92    


 


17 19:42  72  180/81    


 


17 19:21     96 Nasal Cannula 10.00 


 


17 16:14 97.8 80 18 196/89 96 Nasal Cannula 7.00 


 


17 13:51     93 OxyMask 10.00 














I & O 


 


 8/3/17





 07:00


 


Intake Total 2950 ml


 


Output Total 700 ml


 


Balance 2250 ml








General Appearance:  No Apparent Distress, Chronically ill, Thin


HEENT:  PERRL/EOMI, Normal ENT Inspection, Pharynx Normal


Neck:  Normal Inspection


Respiratory:  No Accessory Muscle Use, No Respiratory Distress, Other (in rales 

on the left with diminished breath sounds no rales noted on the right.  No 

wheezing appreciated.)


Cardiovascular:  Regular Rate, Rhythm, No Edema, No Gallop, No JVD, No Murmur, 

Normal Peripheral Pulses


Capillary Refill:  Less Than 3 Seconds


Extremity:  Normal Capillary Refill, Normal Inspection, Normal Range of Motion, 

Non Tender, No Calf Tenderness, No Pedal Edema


Neurologic/Psychiatric:  Alert, Oriented x3, No Motor/Sensory Deficits, Normal 

Mood/Affect, CNs II-XII Norm as Tested


Skin:  Normal Color, Warm/Dry





Results


Lab


Laboratory Tests


17 05:30








8/3/17 05:10











Assessment/Plan


Assessment/Plan


CAP with sepsis with mucous plugging s/p bronchoscopy  -- no endobronchial 

lesion noted


   -Continue Abx


   -pan cultures negative thus far


   - CT without contrast of chest- repeat as an out patient 


Tobacco dependance 


   -education 


   -nicotine patch 


   


Hx of renal cell carcinoma s/p R nephrectomy 


 








232





Clinical Quality Measures


DVT/VTE Risk/Contraindication:


Risk Factor Score Per Nursin


RFS Level Per Nursing on Admit:  4+=Very High











TIO TORIBIO DO Aug 3, 2017 12:56

## 2017-08-03 NOTE — PHYSICAL THERAPY DAILY NOTE
PT Daily Note-Current


Subjective


Pt sitting at EOB upon arrival.  Pt reports feeling better and wondering about 

discharge.  Pt agrees to PT.





Pain





   Location:  No Pain Reported





Mental Status


Patient Orientation:  Person, Place, Situation


Attachments:  Oxygen (5L), IV





Transfers


              Functional [place] Measure


0=Not Assessed/NA   4=Minimal Assistance


1=Total Assistance   5=Supervision or Setup


2=Maximal Assistance   6=Modified [place]


3=Moderate Assistance   7=Complete IndependenceIRFPAI Quality Coding Scale











6 Independent with activity with or without an assistive device


 


5  Patient requires set up or clean up by helper.  Patient completes activity  

by  themselves


 


4 Supervision or touching assist (CGA). [place] provide cues , steadying assist


 


3 The helper provides less than half the effort to complete the activity


 


2 The helper provides more than half the effort to complete the activity


 


1 Dependent.  The helper does all the effort to complete an activity 


 


7 Patient refused to complete or attempt activity


 


9 The patient did not perform the activity before the current illness or injury


 


88 Not attempted due to Medical conditions or safety concerns








Scootin


Sit to/from Stand:  6





Weight Bearing


Weight Bearing Restriction:  Full Weight Bearing


Location Restriction:  LE Bilateral





Gait Training


Distance (FIM):  3=150 ft


Distance:  350'


Gait Level of Assist:  5


Gait Assistive Device:  None


Pt walks with normalized gait pattern but occasionally uses IV pole to steady 

self.





Treatments


Pt transferred from EOB to standing w/o AD at [facility].  Pt ambualted w/o AD 

at [facility].  Pt returned to room to use restroom.  Pt returned to EOB after 

restroom.  Pt is resting with all needs met at end of tx.





Assessment


Current Status:  Good Progress


Pt needs less O2 than yesterday and is able to transfers and ambulate well.





PT Long Term Goals


Long Term Goals


PT Long Term Goals Time Frame:  Aug 9, 2017


Transfers (B,C,W/C) (FIM):  7


Gait (FIM):  7





PT Plan


Problem List


Problem List:  Activity Tolerance





Treatment/Plan


Treatment Plan:  Continue Plan of Care


Treatment Plan:  Bed Mobility, Functional Activity Jennifer, Functional Strength, 

Gait, Safety, Therapeutic Exercise, Transfers


Treatment Duration:  Aug 9, 2017


Frequency:  5 times per week


Estimated Hrs Per Day:  .25 hour per day


Patient and/or Family Agrees t:  Yes





Safety Risks/Education


Patient Education:  Gait Training, Transfer Techniques, Correct Positioning, 

Safety Issues


Teaching Recipient:  Patient


Teaching Methods:  Discussion


Response to Teaching:  Verbalize Understanding





Time/GCodes


Time In:  830


Time Out:  900


Total Billed Treatment Time:  30


Total Billed Treatment


visit, GT (15m) & FA (15m)











SHERIF MICHAELS PTA Aug 3, 2017 10:28

## 2017-08-03 NOTE — PROGRESS NOTE-HOSPITALIST
Progress Note


HPI/CC on Admission


Mrs. Herring is a pleasant 86-year-old white female who reports that she was 

feeling in her usual state of health until Thursday.  Posterior 86 birthday and 

she was out playing bingo.  She felt fatigued while there but then developed 

Reiger's with chills.  Following that she had increase in her baseline cough 

that was nonproductive.  She had no further Reiger's but continued to feel 

poorly with increased cough and progressive shortness of breath.  Reportedly 

her O2 saturations on 3 L at home were down in the 70 percent when she arrived 

in the emergency room and was in significant respiratory distress.  After 

breathing treatments and Solu-Medrol her status improved.  Right perihilar 

infiltrate was reported by the urgency room physician and she was admitted on 

the pneumonia protocol.  She continues to smoke about half a pack of cigarettes 

per day and due to her advanced age and known lung disease requested DO NOT 

RESUSCITATE status which is been initiated.  Upon my arrival she was eating 

breakfast and feeling significantly better denying shortness of breath at rest 

with nonproductive cough.  She denies chest pain and was alert and oriented.





Chart Review:


No fever


Vitals stable


CXR today bilateral basal infiltrates





Dr. Snyder Review:


Pt had a thick mucus plug. Secretions were suctioned


Cancer was suspected but results not yet known


Ct indicated endobronchial mass


Previous nephrectomy


Cytology will be negative


Pt will need repeat CT as out pt





Dr. Garrido Review:


Pt states that she would be okay to die in her sleep


Pt still smokes





SW Review:


Dr. Snyder ordered CT chest and it looked terrible


Bronc was performed by Dr. Snyder this am





Patient Interview:


Pt confirms still feeling drowsy


Pt denies experiencing pain


Pt confirms wearing O2 at night


Pt confirms Dr. Rai as PCP


Pt states that she lives at home alone. Pt lives very close to Dr. Rai's 

clinic


Physical exam stable


Pt states that she works at [email or web address]. She has been there for several years. Pt 

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


Pt states that she should eat but would like to rest





Plan:


Ambulate


SW consult





Scribed by Marcia Keith under the direct supervision of Dr. Salgado.





Progress Notes/Assess & Plan


Date Seen


8/3/17


Time Seen by Provider:  10:00


Diagonsis/Assessment & Plan


Chart Review:


No fever


BP still elevated so Hydralazine 25 TID started


CBC and CMP normal


K+ 3.3 so supplemented


Conferred with Lillian and likely will DC soon





Dr. Snyder Review:


Pt will need repeat CT. This will be out-pt


Cytology is not back yet. Lung ca was suggested, it may have just been mucus 

plugging





RN Review:


Pt's daughter wanted to discuss her mother but had to go to work; her phone 

number is available.





PT Review:


Pt is doing well. She is okay to DC





Patient Interview:


Pt feels strong but has loose stool


Labs discussed and there is no major infection


Pt is adamant about DC, but I cannot DC her if she has diarrhea. Imodium was 

discussed and she


wondered if this would work with the abx


Home O2 discussed for during the day. Pt will have an eval today.


Physical exam stable.


Repeat CT of lungs needed and this was discussed for out-pt. Pt states that she 

was on 3L O2 at home,


but she was increased yesterday.


Pt confirms [facility] as pharmacy











AFVSS, Pleasant, O x 3, angry


RRR, CTAB decreased BS in bases


no edema





Laboratory Tests


8/3/17 05:10

















Assessment: 


Right lung mass strongly suggestive of malignancy with postobstructive 

pneumonia on abx and bronchoscopy results pending but Cx negative but needs CT 

scan in 8 weeks so DC abx due to diarrhea and n source


COPD/tobaccoism


HTN OOC


Chronic hypoxia on O2 at night but needs continuous at DC 4.5L/min


Past history of renal cell carcinoma resected fall of 2016


Abx associated diarrhea





Plan:


Ambulate


Possible DC tomorrow


Home O2 eval orders completed


Imodium, Questran


DC Zosyn and Levaquin


Lactinex





Scribed by Marcia Keith under the direct supervision of Dr. Salgado.











LAILA SALGADO DO Aug 3, 2017 10:25

## 2017-08-04 VITALS — SYSTOLIC BLOOD PRESSURE: 190 MMHG | DIASTOLIC BLOOD PRESSURE: 78 MMHG

## 2017-08-04 VITALS — DIASTOLIC BLOOD PRESSURE: 84 MMHG | SYSTOLIC BLOOD PRESSURE: 170 MMHG

## 2017-08-04 VITALS — DIASTOLIC BLOOD PRESSURE: 78 MMHG | SYSTOLIC BLOOD PRESSURE: 182 MMHG

## 2017-08-04 VITALS — SYSTOLIC BLOOD PRESSURE: 196 MMHG | DIASTOLIC BLOOD PRESSURE: 89 MMHG

## 2017-08-04 VITALS — SYSTOLIC BLOOD PRESSURE: 186 MMHG | DIASTOLIC BLOOD PRESSURE: 82 MMHG

## 2017-08-04 LAB
ANION GAP SERPL CALC-SCNC: 15 MMOL/L (ref 5–14)
BASOPHILS # BLD AUTO: 0 10^3/UL (ref 0–0.1)
BASOPHILS NFR BLD AUTO: 0 % (ref 0–10)
BASOPHILS NFR BLD MANUAL: 0 %
BUN SERPL-MCNC: 31 MG/DL (ref 7–18)
BUN/CREAT SERPL: 28
CALCIUM SERPL-MCNC: 8.7 MG/DL (ref 8.5–10.1)
CHLORIDE SERPL-SCNC: 106 MMOL/L (ref 98–107)
CO2 SERPL-SCNC: 19 MMOL/L (ref 21–32)
CREAT SERPL-MCNC: 1.09 MG/DL (ref 0.6–1.3)
EOSINOPHIL # BLD AUTO: 0 10^3/UL (ref 0–0.3)
EOSINOPHIL NFR BLD AUTO: 0 % (ref 0–10)
EOSINOPHIL NFR BLD MANUAL: 0 %
ERYTHROCYTE [DISTWIDTH] IN BLOOD BY AUTOMATED COUNT: 13.8 % (ref 10–14.5)
GFR SERPLBLD BASED ON 1.73 SQ M-ARVRAT: 48 ML/MIN
GLUCOSE SERPL-MCNC: 128 MG/DL (ref 70–105)
LYMPHOCYTES # BLD AUTO: 0.5 X 10^3 (ref 1–4)
LYMPHOCYTES NFR BLD AUTO: 6 % (ref 12–44)
MAGNESIUM SERPL-MCNC: 1.6 MG/DL (ref 1.8–2.4)
MCH RBC QN AUTO: 28 PG (ref 25–34)
MCHC RBC AUTO-ENTMCNC: 32 G/DL (ref 32–36)
MCV RBC AUTO: 87 FL (ref 80–99)
MONOCYTES # BLD AUTO: 0.7 X 10^3 (ref 0–1)
MONOCYTES NFR BLD AUTO: 7 % (ref 0–12)
NEUTROPHILS # BLD AUTO: 8.2 X 10^3 (ref 1.8–7.8)
NEUTROPHILS NFR BLD AUTO: 87 % (ref 42–75)
NEUTS BAND NFR BLD MANUAL: 86 %
NEUTS BAND NFR BLD: 5 %
PLATELET # BLD: 264 10^3/UL (ref 130–400)
PMV BLD AUTO: 11 FL (ref 7.4–10.4)
POTASSIUM SERPL-SCNC: 3.8 MMOL/L (ref 3.6–5)
RBC # BLD AUTO: 4.36 10^6/UL (ref 4.35–5.85)
SODIUM SERPL-SCNC: 140 MMOL/L (ref 135–145)
VARIANT LYMPHS NFR BLD MANUAL: 6 %
WBC # BLD AUTO: 9.4 10^3/UL (ref 4.3–11)

## 2017-08-04 RX ADMIN — POTASSIUM CHLORIDE SCH MEQ: 750 TABLET, FILM COATED, EXTENDED RELEASE ORAL at 11:32

## 2017-08-04 RX ADMIN — Medication SCH MG: at 06:36

## 2017-08-04 RX ADMIN — IPRATROPIUM BROMIDE AND ALBUTEROL SULFATE SCH ML: .5; 3 SOLUTION RESPIRATORY (INHALATION) at 02:17

## 2017-08-04 RX ADMIN — NICOTINE SCH MG: 14 PATCH, EXTENDED RELEASE TRANSDERMAL at 08:23

## 2017-08-04 RX ADMIN — CHOLESTYRAMINE SCH GM: 4 POWDER, FOR SUSPENSION ORAL at 06:36

## 2017-08-04 RX ADMIN — LACTOBACILLUS TAB SCH TAB.CHEW: TAB at 05:29

## 2017-08-04 RX ADMIN — LACTOBACILLUS TAB SCH TAB.CHEW: TAB at 11:32

## 2017-08-04 RX ADMIN — IPRATROPIUM BROMIDE AND ALBUTEROL SULFATE SCH ML: .5; 3 SOLUTION RESPIRATORY (INHALATION) at 11:20

## 2017-08-04 RX ADMIN — IPRATROPIUM BROMIDE AND ALBUTEROL SULFATE SCH ML: .5; 3 SOLUTION RESPIRATORY (INHALATION) at 08:50

## 2017-08-04 RX ADMIN — HYDRALAZINE HYDROCHLORIDE SCH MG: 25 TABLET ORAL at 05:30

## 2017-08-04 RX ADMIN — METHYLPREDNISOLONE SODIUM SUCCINATE SCH MG: 40 INJECTION, POWDER, FOR SOLUTION INTRAMUSCULAR; INTRAVENOUS at 05:29

## 2017-08-04 RX ADMIN — POTASSIUM CHLORIDE SCH MEQ: 750 TABLET, FILM COATED, EXTENDED RELEASE ORAL at 06:36

## 2017-08-04 RX ADMIN — CLONIDINE HYDROCHLORIDE PRN MG: 0.1 TABLET ORAL at 04:35

## 2017-08-04 NOTE — OCC THERAPY PROGRESS NOTE
Therapy Progress Note


Pt discharging today.  1 visit - discussing/education on home safety and 

tripping hazards. Pt states understanding of education and has no questions or 

concerns.





[id number]











RABIA CORTEZ Aug 4, 2017 13:35

## 2017-08-04 NOTE — DISCHARGE SUMMARY-HOSPITALIST
Diagnosis/Chief Complaint


Date of Admission


2017 at 01:13


Date of Discharge





Discharge Date:  Aug 4, 2017


Admission Diagnosis


1.  Atypical pneumonia most likely with acute exacerbation of COPD.  Continue 

broad-spectrum antibiotic coverage.


2.  Patient meets criteria for sepsis not severe.


3.  Likely stage III chronic renal disease.





Discharge Diagnosis


Chart Review:


No fever


BP still elevated so Hydralazine 25 TID started


CBC and CMP normal


K+ 3.3 so supplemented


Conferred with Lillian and likely will DC soon





Dr. Snyder Review:


Pt will need repeat CT. This will be out-pt


Cytology is not back yet. Lung ca was suggested, it may have just been mucus 

plugging





RN Review:


Pt's daughter wanted to discuss her mother but had to go to work; her phone 

number is available.





PT Review:


Pt is doing well. She is okay to DC





Patient Interview:


Pt feels strong but has loose stool


Labs discussed and there is no major infection


Pt is adamant about DC, but I cannot DC her if she has diarrhea. Imodium was 

discussed and she


wondered if this would work with the abx


Home O2 discussed for during the day. Pt will have an eval today.


Physical exam stable.


Repeat CT of lungs needed and this was discussed for out-pt. Pt states that she 

was on 3L O2 at home,


but she was increased yesterday.


Pt confirms [facility] as pharmacy











AFVSS, [place], O x 3, angry


RRR, CTAB decreased BS in bases


no edema





Laboratory Tests


8/3/17 05:10

















Assessment: 


Right lung mass strongly suggestive of malignancy with postobstructive 

pneumonia on abx and bronchoscopy results pending but Cx negative but needs CT 

scan in 8 weeks so DC abx due to diarrhea and n source


COPD/tobaccoism


HTN OOC


Chronic hypoxia on O2 at night but needs continuous at DC 4.5L/min


Past history of renal cell carcinoma resected  of 


Abx associated diarrhea





Plan:


Ambulate


Possible DC tomorrow


Home O2 eval orders completed


Imodium, Questran


DC Zosyn and Levaquin


Lactinex





Scribed by Marcia Keith under the direct supervision of Dr. Bland.





Hospital course: Patient had an uneventful hospital course although 

hypertension became an issue for 48 hours before discharge due to steroid 

administration.  Bronchoscopy was obtained cytology likely not forthcoming due 

to the fact that it was just mucus plugging and lung mass will need to be 

addressed with close follow-up with CT scan surveillance.  She is a smoker I 

counseled her to stop smoking but she reports that she is unable to do that.  

She completed antibiotics since there was no evidence of any infectious 

etiology so antibiotics associated loose stools were managed with Lactinex and 

Questran and Imodium and all medications were sent in along with steroid taper 

and clonidine 3 times a day for elevated blood pressure.  Home health was 

ordered and continuous oxygen at 4.5 L were required.








Discharge Summary


Discharge Physical Examination


Allergies:  


Coded Allergies:  


     No Known Drug Allergies (Verified , 10/6/14)


Vitals & I&Os





Vital Signs








  Date Time  Temp Pulse Resp B/P (MAP) Pulse Ox O2 Delivery O2 Flow Rate FiO2


 


17 11:20     92 Nasal Cannula 4.00 


 


17 08:09 97.8 72 22 196/89    











Hospital Course


Labs (last 24 hrs)


Laboratory Tests


17 03:58: 


White Blood Count 9.4, Red Blood Count 4.36, Hemoglobin 12.2, Hematocrit 38, 

Mean Corpuscular Volume 87, Mean Corpuscular Hemoglobin 28, Mean Corpuscular 

Hemoglobin Concent 32, Red Cell Distribution Width 13.8, Platelet Count 264, 

Mean Platelet Volume 11.0H, Neutrophils (%) (Auto) 87H, Lymphocytes (%) (Auto) 

6L, Monocytes (%) (Auto) 7, Eosinophils (%) (Auto) 0, Basophils (%) (Auto) 0, 

Neutrophils # (Auto) 8.2H, Lymphocytes # (Auto) 0.5L, Monocytes # (Auto) 0.7, 

Eosinophils # (Auto) 0.0, Basophils # (Auto) 0.0, Neutrophils % (Manual) 86, 

Lymphocytes % (Manual) 6, Monocytes % (Manual) 3, Eosinophils % (Manual) 0, 

Basophils % (Manual) 0, Band Neutrophils 5, Elliptocytes SLIGHT, Sodium Level 

140, Potassium Level 3.8, Chloride Level 106, Carbon Dioxide Level 19L, Anion 

Gap 15H, Blood Urea Nitrogen 31H, Creatinine 1.09, Estimat Glomerular 

Filtration Rate 48, BUN/Creatinine Ratio 28, Glucose Level 128H, Calcium Level 

8.7, Phosphorus Level 2.5, Magnesium Level 1.6L





Microbiology


17 Blood Culture - Preliminary, Resulted


          No growth


17 Mycobacterial Culture - Preliminary, Resulted


         





Pending Labs








Discharge


Home Medications:





Active Scripts


Active


Prednisone 10 Mg Tab.ds.pk 10 Mg PO DAILY


     Take 6 tabs(60mg)daily,decrease by 1 tab(10MG)daily.


Floranex Tablet (L. Acidophilus/Bulgaricus) 1 Each Tablet 1 Tab.chew PO AC


Loperamide (Loperamide HCl) 2 Mg Capsule 2 Mg PO AS NEEDED PRN


Lisinopril 10 Mg Tablet 20 Mg PO DAILY@0600


Clonidine HCl 0.1 Mg Tablet 0.1 Mg PO TID PRN


Prevalite Packet (Cholestyramine/Aspartame) 4 Gm Powd.pack 4 Gm PO TID@0700,1500

,2100


Magnesium Oxide 400 Mg Tablet 400 Mg PO DAILY 1 Days


Reported


Albuterol Sulfate 2.5 Mg/3 Ml Vial.neb 2.5 Mg NEB Q4H PRN


Breo Ellipta 100-25 Mcg INH (Fluticasone/Vilanterol) 1 Each Blst.w.dev 1 Puff 

IH DAILY PRN


Meloxicam 7.5 Mg Tablet 7.5 Mg PO DAILY PRN


Xanax (Alprazolam) 0.25 Mg Tablet 0.25 Mg PO HS


Tenormin 25 Mg (Atenolol) 25 Mg Tablet 25 Mg PO DAILY


Omeprazole 40 Mg Capsule.dr 40 Mg PO DAILY


Levothroid (Levothyroxine Sodium) 88 Mcg Tab 88 Mcg PO DAILY


Amlodipine Besylate 5 Mg Tablet 5 Mg PO DAILY





Instructions to patient/family


Please see electonic discharge instructions given to patient.





Clinical Quality Measures


DVT/VTE Risk/Contraindication:


Risk Factor Score Per Nursin


RFS Level Per Nursing on Admit:  4+=Very High











LAILA BLAND DO Aug 4, 2017 11:56

## 2017-08-04 NOTE — D/C HH FACE TO FACE ORDER
D/C  Face to Face Orders


Instructions for Patient


Patient Instructions/FollowUp:  


See Dr Rai in 1 week


See Dr Snyder in 4 weeks for repeat CT chest to evaluate lung mass


Physician to follow Patient:  Dr Rai


Discharge Diet for Home:  Regular Diet


Patient Problems:  


AECOPD


Lung mass


Smoker


Mucous plugging on Bronchoscopy


Goals for Patient:  


Strengthen to be able to remain in her home





Patient Data-Allergies,Ht & Wt


Patient Allergies:  


Coded Allergies:  


     No Known Drug Allergies (Verified , 10/6/14)


Height (Feet):  5


Height (Inches):  1.00


Weight (Pounds):  99


Weight (Ounces):  1.0





Home Health Need/Face to Face


Date of Face to Face:  Aug 4, 2017


Clinical Findings:  Generalized weakness and fatigue





Home O2 continuous 4.5L


I have seen Pt face-to-face:  Yes


Discharged To:  Home


Diagnosis/Conditions:  


AECOPD


Lung mass


Smoker


Mucous plugging on Bronchoscopy


Problems/Diagnosis/Condition:  


Patient is Homebound due to:  Shortness of breath/distress


Homebound Status


   Due to the above stated illness, injury or surgical procedure (medical 

condition or diagnosis) and associated clinical findings, the patient is 

homebound because of his/her inability to leave home except with aid of a 

supportive device and/or person AND leaving the home requires a considerable 

and taxing effort or is medically contraindicated.


Pt req the following assistanc:  Walker





Home Health Nursing Orders


Home Health Services Order:  Nursing Services





Home Health Infusion Therapy


Line Start Date:  Jul 30, 2017


Line Start Time:  0030


Line Type:  Saline Lock


Site Location:  [place]


Certify Stmt


I certify that this patient is under my care and that I, a nurse practitioner 

or a physician; a assistant working with me, had a face to face encounter that -

meets the physician face to face encounter requirements with this patient as 

dated.











LAILA SALGADO DO Aug 4, 2017 11:56

## 2017-10-13 ENCOUNTER — HOSPITAL ENCOUNTER (INPATIENT)
Dept: HOSPITAL 75 - 4TH | Age: 82
LOS: 4 days | Discharge: NURSING, REHAB OR LONG-TERM CARE | DRG: 963 | End: 2017-10-17
Attending: FAMILY MEDICINE | Admitting: FAMILY MEDICINE
Payer: MEDICARE

## 2017-10-13 VITALS — WEIGHT: 108.37 LBS | HEIGHT: 61 IN | BODY MASS INDEX: 20.46 KG/M2

## 2017-10-13 VITALS — SYSTOLIC BLOOD PRESSURE: 127 MMHG | DIASTOLIC BLOOD PRESSURE: 58 MMHG

## 2017-10-13 VITALS — SYSTOLIC BLOOD PRESSURE: 136 MMHG | DIASTOLIC BLOOD PRESSURE: 58 MMHG

## 2017-10-13 DIAGNOSIS — S32.512A: ICD-10-CM

## 2017-10-13 DIAGNOSIS — Z66: ICD-10-CM

## 2017-10-13 DIAGNOSIS — M81.0: ICD-10-CM
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
## 2017-10-13 DIAGNOSIS — E83.42: ICD-10-CM

## 2017-10-13 DIAGNOSIS — Z90.5: ICD-10-CM

## 2017-10-13 DIAGNOSIS — Z99.81: ICD-10-CM

## 2017-10-13 DIAGNOSIS — J96.01: ICD-10-CM

## 2017-10-13 DIAGNOSIS — S32.592A: ICD-10-CM

## 2017-10-13 DIAGNOSIS — E03.9: ICD-10-CM

## 2017-10-13 DIAGNOSIS — D72.829: ICD-10-CM

## 2017-10-13 DIAGNOSIS — J98.11: ICD-10-CM

## 2017-10-13 DIAGNOSIS — Z85.528: ICD-10-CM

## 2017-10-13 DIAGNOSIS — I48.91: ICD-10-CM

## 2017-10-13 DIAGNOSIS — F17.210: ICD-10-CM

## 2017-10-13 DIAGNOSIS — I10: ICD-10-CM

## 2017-10-13 DIAGNOSIS — J44.9: ICD-10-CM

## 2017-10-13 DIAGNOSIS — F41.9: ICD-10-CM
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
## 2017-10-13 DIAGNOSIS — I47.1: ICD-10-CM

## 2017-10-13 DIAGNOSIS — S72.142A: Primary | ICD-10-CM

## 2017-10-13 PROCEDURE — 36415 COLL VENOUS BLD VENIPUNCTURE: CPT

## 2017-10-13 PROCEDURE — 85025 COMPLETE CBC W/AUTO DIFF WBC: CPT

## 2017-10-13 PROCEDURE — 71010: CPT

## 2017-10-13 PROCEDURE — 94640 AIRWAY INHALATION TREATMENT: CPT

## 2017-10-13 PROCEDURE — 94760 N-INVAS EAR/PLS OXIMETRY 1: CPT

## 2017-10-13 PROCEDURE — 83880 ASSAY OF NATRIURETIC PEPTIDE: CPT

## 2017-10-13 PROCEDURE — 80053 COMPREHEN METABOLIC PANEL: CPT

## 2017-10-13 RX ADMIN — IPRATROPIUM BROMIDE AND ALBUTEROL SULFATE SCH ML: .5; 3 SOLUTION RESPIRATORY (INHALATION) at 14:38

## 2017-10-13 RX ADMIN — IPRATROPIUM BROMIDE AND ALBUTEROL SULFATE SCH ML: .5; 3 SOLUTION RESPIRATORY (INHALATION) at 22:51

## 2017-10-13 RX ADMIN — FLUTICASONE PROPIONATE AND SALMETEROL XINAFOATE SCH PUFF: 115; 21 AEROSOL, METERED RESPIRATORY (INHALATION) at 19:25

## 2017-10-13 RX ADMIN — Medication SCH ML: at 20:29

## 2017-10-13 RX ADMIN — IPRATROPIUM BROMIDE AND ALBUTEROL SULFATE SCH ML: .5; 3 SOLUTION RESPIRATORY (INHALATION) at 19:24

## 2017-10-13 RX ADMIN — Medication SCH ML: at 13:58

## 2017-10-13 RX ADMIN — DOCUSATE SODIUM SCH MG: 100 CAPSULE ORAL at 20:29

## 2017-10-13 RX ADMIN — ALPRAZOLAM SCH MG: 0.25 TABLET ORAL at 20:29

## 2017-10-13 RX ADMIN — NICOTINE SCH MG: 7 PATCH, EXTENDED RELEASE TRANSDERMAL at 16:32

## 2017-10-13 RX ADMIN — APIXABAN SCH MG: 2.5 TABLET, FILM COATED ORAL at 20:29

## 2017-10-13 NOTE — PHYSICAL THERAPY EVALUATION
PT Evaluation-General


Medical Diagnosis


Admission Date


Oct 13, 2017 at 10:46


Medical Diagnosis:  left hip fracture


Onset Date:  Oct 5, 2017





Therapy Diagnosis


Therapy Diagnosis:  weakness and debility





Height/Weight


Height (Feet):  5


Height (Inches):  1.00


Weight (Pounds):  110


Weight (Ounces):  6.0





Precautions


Precautions/Isolations:  Standard Precautions





Weight Bear Status


Right Lower Extremity:  Right


Full Weight Bearing


Left Lower Extremity:  Left


Touch Toe Bearing





Referral


Physician:  Garry


Reason for Referral:  Evaluation/Treatment





Medical History


Pertinent Medical History:  COPD, HTN, Hypothroidism, Renal Insufficiency


Additional Medical History


respiratory distress requiring Bipap and vapotherm


Current History


SWB status


Reviewed History:  Yes





Social History


Home:  Single Level


Current Living Status:  Alone


Entry Into Home:  Stairs With Railing


PT Steps Into Home:  2





Prior/Core FIM


Prior Level of Function


              Functional [place] Measure


0=Not Assessed/NA   4=Minimal Assistance


1=Total Assistance   5=Supervision or Setup


2=Maximal Assistance   6=Modified [place]


3=Moderate Assistance   7=Complete [place]


Bed Mobility:  7


Transfers (B,C,W/C) (FIM):  7


Gait:  7





PT Evaluation-Current


Subjective


Patient agrees to PT.





Pain





   Numeric Pain Scale:  5-Moderate Pain


   Location:  Left


   Location Body Site:  Hip


   Pain Description:  Ache





Objective


Patient Orientation:  Normal For Age


Problem Solving:  Good


Attachments:  Oxygen (vapotherm)





ROM/Strength


ROM Lower Extremities


bilateral LE WNL


Strenght Lower Extremities


right and left grossly 3/5 grossly





Integumentary/Posture


Integumentary


refer to nursing notes


Bowel Incontinence:  No


Bladder Incontinence:  No


Posture


WNL





Neuromuscular


(Tone, Coordination, Reflexes)


grossly intact





Sensory


Vision:  Functional


Hearing:  Functional


Hand Dominance:  Right


Sensation Right Lower Extremit:  Intact


Sensation Left Lower Extremity:  Intact





Transfers


              Functional [place] Measure


0=Not Assessed/NA   4=Minimal Assistance


1=Total Assistance   5=Supervision or Setup


2=Maximal Assistance   6=Modified [place]


3=Moderate Assistance   7=Complete [place]


Transfers (B, C, W/C) (FIM):  4


Scootin


Rollin


Supine to/from Sit:  4


Sit to/from Stand:  4


bed t/f WC(FIM only if WC use):  4


Sit to Lying (QC):  3


Lying to Sitting/Side of Bed(Q:  3


Sit to Stand (QC):  3


Chair/Bed-to-Chair Xfer(QC):  3


Patient is able to perform TTWB left LE





Balance


Sitting Static:  Normal


Sitting Dynamic:  Normal


Standing Static:  Fair


 Standing Dynamic:  Fair





Treatment


bilateral LE exercises 25 reps x 3 sets LAQ, AP, hip flexion, abd/add





Assessment/Needs


86 y.o. female, will benefit from skilled PT to address functional strength and 

mobility to improve current LOF and to safely return to home or care facility 

at maximum LOF.


Rehab Potential:  Fair


Post Rehab Potential-Barriers:  pulmonary function





PT Long Term Goals


Long Term Goals


PT Long Term Goals Time Frame:  2017


Transfers (B,C,W/C) (FIM):  5


Sit to Lying (QC):  4


Lying-Sitting on Side/Bed(QC):  4


Sit to Stand (QC):  4


Rollin


Chair/Bed-to-Chair Xfer(QC):  4


Does the Patient Walk:  No and Walking Goal IS indicated


Gait (FIM):  1 (TTWB left LE)


Gait distance (FIM):  1=up to 49 ft


Distance:  25'


Walk 50ft with 2 Turns (QC):  88


Walk 150 ft (QC):  88


Gait Level of Assist:  4


Gait Assistive Device:  FWW





PT Plan


Problem List


Problem List:  Activity Tolerance, Functional Strength, Balance, Gait, Transfer

, Bed Mobility





Treatment/Plan


Treatment Plan:  Continue Plan of Care


Treatment Plan:  Bed Mobility, Education, Functional Activity Jennifer, Functional 

Strength, Gait, Safety, Therapeutic Exercise, Transfers


Treatment Duration:  2017


Frequency:  11 times per week


Estimated Hrs Per Day:  .5 hour per day


Patient and/or Family Agrees t:  Yes





Safety Risks/Education


Patient Education:  Safety Issues


Teaching Recipient:  Patient


Teaching Methods:  Discussion


Response to Teaching:  Verbalize Understanding





Discharge Recommendations


Therapy D/C Recommendations:  Assisted Living, Nursing Home Placement





Time/GCodes


Time In:  1230


Time Out:  1257


Total Billed Treatment Time:  27


Total Billed Treatment


1 visit


EVLowC 12 min


EX 15











MELIDA COOPER PT Oct 13, 2017 14:20

## 2017-10-13 NOTE — OCCUPATIONAL THERAPY EVAL
OT Evaluation-General/PLF


Medical Diagnosis


Admission Date


Oct 13, 2017 at 10:46


Medical Diagnosis:  left hip fracture


Onset Date:  Oct 5, 2017





Therapy Diagnosis


Therapy Diagnosis:  impaired self care skills





Height/Weight


Height (Feet):  5


Height (Inches):  1.00


Weight (Pounds):  110


Weight (Ounces):  6.0





Weight Bear Status


Weight Bearing Restriction:  Touch Toe Bearing


Location Restriction:  L LE





Referral


Physician:  Garry





Medical History


Pertinent Medical History:  COPD, HTN, Hypothroidism, Renal Insufficiency


Additional Medical History


kidney cancer s/p nephrectomy, osteoporosis, right hip fracture.


Current History


Pt had surgical intervention for left hip fracture. Pt admitted to swing bed 

status this date.


Reviewed History:  Yes





Social History


Home:  Single Level


Current Living Status:  Alone


Entry Into Home:  Stairs With Railing


 Steps Into Home:  2





ADL-Prior Level of Function


ADL PLOF Comments


Pt reports being independent prior to admission. Was not using any assistive 

devices. Pt states she still works ~12 hrs per week in the [place] at [employer]. Pt states she does not like showers and she can't get out of the 

tub well anymore, so she just does sponge baths.


DME/Equipment:  Grab Bars, Tub/Shower


Drive Self:  Yes





OT Current Status


Subjective


Pt sitting in chair, agrees to treatment. Pt reports 4/10 in left hip with 

movement





Mental Status/Objective


Patient Orientation:  Person, Place, Situation


Attachments:  IV, NG Tube





Current


Glasses/Contacts:  Yes


Hearing Aids:  No


Dentures/Partials:  No


Hand Dominance:  Right


Upper Extremity ROM


Grossly WFL


Upper Extremity Coordination


Intact


Upper Extremity Sensation


Intact per pt report


Upper Extremity Strength


Grossly 4-/5





ADL-Treatment


ADL-Current


Pt participated in UE assessment while seated in chair. Pt demonstrated ability 

to perform sit to stand with minimal assistance and cues for safety. Pt 

completed grooming tasks while seated in chair. Pt brushed teeth, combed hair, 

and washed face with set up and increased time. Pt reports feeding self after 

set up. Pt sitting in chair with needs met after session.


              Functional [place] Measure


0=Not Assessed/NA   4=Minimal Assistance


1=Total Assistance   5=Supervision or Setup


2=Maximal Assistance   6=Modified [place]


3=Moderate Assistance   7=Complete IndependenceIRFPAI Quality Coding Scale











6 Independent with activity with or without an assistive device


 


5  Patient requires set up or clean up by helper.  Patient completes activity  

by  themselves


 


4 Supervision or touching assist (CGA). [place] provide cues , steadying assist


 


3 The helper provides less than half the effort to complete the activity


 


2 The helper provides more than half the effort to complete the activity


 


1 Dependent.  The helper does all the effort to complete an activity 


 


7 Patient refused to complete or attempt activity


 


9 The patient did not perform the activity before the current illness or injury


 


88 Not attempted due to Medical conditions or safety concerns








Eating (FIM):  5 (by pt report)


Eating (QC):  5


Grooming (FIM):  5


Oral Hygiene (QC):  5





Education


OT Patient Education:  Rehab process


Teaching Recipient:  Patient


Teaching Methods:  Discussion


Response to Teaching:  Verbalize Understanding





OT Short Term Goals


Short Term Goals


1=Demonstrate adherence to instructed precautions during ADL tasks.


2=Patient will verbalize/demonstrate understanding of assistive devices/

modifications for ADL.


3=Patient will improve strength/tolerance for activity to enable patient to 

perform ADL's.





OT Long Term Goals


Long Term Goals


Time Frame:  Nov 3, 2017


Eating (FIM):  6


Eating (QC):  6


Groomin


Oral Hygiene (QC):  6


Upper Body Dressing(FIM):  5


Lower Body Dressing(FIM):  4


Toileting(FIM):  5


Toileting Hygiene (QC):  5


Toilet/Commode Transfer(FIM):  5


Additional Goals:  1-Demonstrate ADL Tasks, 2-Verbalize Understanding, 3-

ImproveStrength/Jennifer


1=Demonstrate adherence to instructed precautions during ADL tasks.


2=Patient will verbalize/demonstrate understanding of assistive devices/

modifications for ADL.


3=Patient will improve strength/tolerance for activity to enable patient to 

perform ADL's.





OT Education/Plan


Problem List/Assessment


Assessment:  Decreased Activ Tolerance, Decreased UE Strength, Dependent 

Transfers, Impaired Self-Care Skills


Pt admitted to [facility] status following acute hospitalization for left hip 

fracture. Pt demonstrates decreased ADL functioning, activity tolerance, 

strength, and mobility. Pt to benefit from skilled OT intervention for ADL 

training, transfers, strengthening, adaptive equipment training, and home 

safety education to maximize level of function and allow safe discharge plan.





Discharge Recommendations


Plan/Recommendations:  Continue POC





Treatment Plan/Plan of Care


Treatment,Training & Education:  Yes


Patient would benefit from OT for education, treatment and training to promote 

independence in ADL's, mobility, safety and/or upper extremity function for ADL'

s.


Plan of Care:  ADL Retraining, Functional Mobility, UE Funct Exercise/Act


Treatment Duration:  Nov 3, 2017


Frequency:  5 times per week


Estimated Hrs Per Day:  .25 hour per day


Agreement:  Yes


Rehab Potential:  Fair





Time/GCodes


Start Time:  13:02


Stop Time:  13:30


Total Time Billed (hr/min):  28


Billed Treatment Time


1 visit, EVM(13minutes), ADL(15minutes)











ASH OWENS OT Oct 13, 2017 13:48

## 2017-10-13 NOTE — XMS REPORT
Clinical Summary

 Created on: 10/13/2017



Jigna Herring

External Reference #: [id number]

: 1931

Sex: Female



Demographics







 Address  [address] [place]  [address]

 

 Home Phone  [phone]

 

 Preferred Language  English

 

 Marital Status  Unknown

 

 Tenriism Affiliation  MET

 

 Race  White

 

 Ethnic Group  Not  or 





Author







 Author  [facility]

 

 Organization  [facility]

 

 Address  Unknown

 

 Phone  Unavailable







Support







 Name  Relationship  Address  Phone

 

 , Jessie Guerra  ECON  Unknown  [phone]

 

 , Jenny Guerra  ECON  Unknown  [phone]







Care Team Providers







 Care Team Member Name  Role  Phone

 

  PCP  Unavailable







Source Comments

Some departments are not documenting in the electronic medical record.  If you 
do not see the information that you expected, contact Release of Information in 
the Health Information Management department at [phone] for further 
assistance in locating additional records.[facility]



Allergies







    



  Active Allergy   Reactions   Severity   Noted Date   Comments

 

    



  Seasonal Allergies   RHINITIS   Low   10/13/2016 

 

    



  Sulfa (Sulfonamide   UNKNOWN   Low   10/13/2016 



  Antibiotics)    







Current Medications







      



  Prescription   Sig.   Disp.   Refills   Start   End Date   Status



      Date  

 

      



  levothyroxine (SYNTHROID)   Take 88 mcg by mouth       Active



  88 mcg tablet   daily 30 minutes before     



   breakfast.     

 

      



  omeprazole DR(+)   Take 40 mg by mouth daily       Active



  (PRILOSEC) 40 mg capsule   before breakfast.     

 

      



  amLODIPine (NORVASC) 5 mg   Take 5 mg by mouth every       Active



  tablet   morning.     

 

      



  ALPRAZolam (XANAX) 0.25   Take 0.25 mg by mouth at       Active



  mg tablet   bedtime daily.     

 

      



  cholecalciferol (VITAMIN   Take 1,000 Units by mouth       Active



  D-3) 1,000 units tablet   daily.     

 

      



  cyanocobalamin (VITAMIN   Inject 1 mL into the       Active



  B-12, RUBRAMIN) 1,000   muscle every 30 days.     



  mcg/mL injection      

 

      



  fish oil /omega-3 fatty   Take 1 Cap by mouth       Active



  acids (SEA-OMEGA)   daily.     



  340/1000 mg capsule      

 

      



  atenolol (TENORMIN) 25 mg   Take 25 mg by mouth every       Active



  tablet   morning.     

 

      



  guaiFENesin LA (MUCINEX)   Take 600 mg by mouth       Active



  600 mg tablet   daily.     

 

      



  ibuprofen (ADVIL) 200 mg   Take 200 mg by mouth       Active



  tablet   every 6 hours as needed     



   for Pain. Take with food.     

 

      



  fluticasone (FLONASE) 50   Apply 1 Spray to each       Active



  mcg/actuation nasal spray   nostril as directed daily     



   as needed. Shake bottle     



   gently before using.     

 

      



  albuterol 0.5%   Inhale 2.5 mg solution by       Active



  (PROVENTIL; VENTOLIN) 2.5   nebulizer as directed     



  mg/0.5 mL nebulizer   every 6 hours as needed     



  solution   for Shortness of Breath     



   or Wheezing.     

 

      



  budesonide/formoterol   Inhale 1-2 Puffs by mouth       Active



  (SYMBICORT HFA) 160/4.5   into the lungs twice     



  mcg inhalation   daily.     

 

      



  meloxicam (MOBIC) 7.5 mg   Take 7.5 mg by mouth       Active



  tablet   daily as needed for Pain.     

 

      



  traMADol (ULTRAM) 50 mg   Take 1 Tab by mouth every   30 Tab   0   20  
  Active



  tablet   6 hours as needed for     16  



   Pain.     

 

      



  hyoscyamine (ANASPAZ;   Place 1 Tab under tongue   30 Tab   3   20    
Active



  NULEV; SYMAX FASTABS;   every 4 hours as needed.     16  



  HYOMAX-FT; ED-SPAZ;      



  OSCIMIN) 0.125 mg rapid      



  dissolve tablet      







Active Problems







 



  Problem   Noted Date

 

 



  Panlobular emphysema (HCC)   2016

 

 



  Transitional cell carcinoma of kidney (HCC)   2016

 

 



  Urothelial cancer ([facility])   10/13/2016

 

 



  Overview:



  *Patient was taken for cystoscopy and biopsy 16, with pathology



  revealed:



  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



  - No invasion of the observed subpithelial connective tissue is detected



  *Cytology at the time of biopsy also positive for urothelial cell



  carcinoma.





  16 OPERATIVE PROCEDURE: Right robotic-assisted laparoscopic



  nephroureterectomy with bladder cuff





  Pathology:



  A. Kidney and ureter, "right kidney and ureter", nephroureterectomy:



  Kidney: Noninvasive low grade papillary urothelial carcinoma, Greatest



  dimension: 2.8 cm



  Pathologic Staging (pTNM): pTa Nx Mx





  Last Assessment & Plan:



  Needs cystoscopy in 3 months ~ pt prefers to do closer to home, she will



  call to arrange with Dr Collins



  Will need routine FU cystoscopy every 3 months





  Copy of pathology report given to pt





  Okay to return to work when she is ready, no restrictions





  Discussed increased risks of recurrence with smoking, advised smoking



  cessation







Family History







   



  Medical History   Relation   Name   Comments

 

   



  Cancer   Other  









   



  Relation   Name   Status   Comments

 

   



  Other   







Social History







    



  Tobacco Use   Types   Packs/Day   Years Used   Date

 

    



  Current Every Day Smoker   Cigarettes   1   65 

 

    



  Smokeless Tobacco: Never   



  Used   









 Comments: down to 3/4 pack/day at present









   



  Alcohol Use   Drinks/Week   oz/Week   Comments

 

   



  No   









 



  Sex Assigned at Birth   Date Recorded

 

 



  Not on file 







Last Filed Vital Signs







  



  Vital Sign   Reading   Time Taken

 

  



  Blood Pressure   155/72   2017  1:02 PM CST

 

  



  Pulse   67   2017  1:02 PM CST

 

  



  Temperature   36.7   C (98   F)   2016  8:32 AM CST

 

  



  Respiratory Rate   -   -

 

  



  Oxygen Saturation   92%   2016  9:26 AM CST

 

  



  Inhaled Oxygen   -   -



  Concentration  

 

  



  Weight   45.5 kg (100 lb 6.4 oz)   2017  1:02 PM CST

 

  



  Height   154.9 cm (5' 1")   2017  1:02 PM CST

 

  



  Body Mass Index   18.97   2017  1:02 PM CST







Plan of Treatment







   



  Health Maintenance   Due Date   Last Done   Comments

 

   



  PHYSICAL (COMPREHENSIVE)   1938  



  EXAM   

 

   



  PERTUSSIS VACCINE   1942  

 

   



  TETANUS VACCINE   1948  

 

   



  SHINGLES VACCINE   1991  

 

   



  OSTEOPOROSIS SCREENING   1996  

 

   



  PREVNAR/PNEUMOVAX (#1)   1996  

 

   



  INFLUENZA VACCINE   10/01/2017  







Results

Not on filefrom Last 3 Months

## 2017-10-14 VITALS — DIASTOLIC BLOOD PRESSURE: 60 MMHG | SYSTOLIC BLOOD PRESSURE: 129 MMHG

## 2017-10-14 VITALS — DIASTOLIC BLOOD PRESSURE: 78 MMHG | SYSTOLIC BLOOD PRESSURE: 132 MMHG

## 2017-10-14 VITALS — DIASTOLIC BLOOD PRESSURE: 74 MMHG | SYSTOLIC BLOOD PRESSURE: 167 MMHG

## 2017-10-14 VITALS — SYSTOLIC BLOOD PRESSURE: 138 MMHG | DIASTOLIC BLOOD PRESSURE: 63 MMHG

## 2017-10-14 VITALS — DIASTOLIC BLOOD PRESSURE: 72 MMHG | SYSTOLIC BLOOD PRESSURE: 169 MMHG

## 2017-10-14 VITALS — SYSTOLIC BLOOD PRESSURE: 156 MMHG | DIASTOLIC BLOOD PRESSURE: 67 MMHG

## 2017-10-14 RX ADMIN — APIXABAN SCH MG: 2.5 TABLET, FILM COATED ORAL at 09:05

## 2017-10-14 RX ADMIN — IPRATROPIUM BROMIDE AND ALBUTEROL SULFATE SCH ML: .5; 3 SOLUTION RESPIRATORY (INHALATION) at 22:37

## 2017-10-14 RX ADMIN — ACETAMINOPHEN PRN MG: 325 TABLET ORAL at 15:34

## 2017-10-14 RX ADMIN — PANTOPRAZOLE SODIUM SCH MG: 40 TABLET, DELAYED RELEASE ORAL at 06:16

## 2017-10-14 RX ADMIN — IPRATROPIUM BROMIDE AND ALBUTEROL SULFATE SCH ML: .5; 3 SOLUTION RESPIRATORY (INHALATION) at 06:58

## 2017-10-14 RX ADMIN — ALPRAZOLAM SCH MG: 0.25 TABLET ORAL at 20:22

## 2017-10-14 RX ADMIN — DOCUSATE SODIUM SCH MG: 100 CAPSULE ORAL at 20:22

## 2017-10-14 RX ADMIN — DILTIAZEM HYDROCHLORIDE SCH MG: 180 CAPSULE, COATED, EXTENDED RELEASE ORAL at 09:05

## 2017-10-14 RX ADMIN — Medication SCH ML: at 06:16

## 2017-10-14 RX ADMIN — APIXABAN SCH MG: 2.5 TABLET, FILM COATED ORAL at 20:22

## 2017-10-14 RX ADMIN — NICOTINE SCH MG: 7 PATCH, EXTENDED RELEASE TRANSDERMAL at 15:35

## 2017-10-14 RX ADMIN — ACETAMINOPHEN PRN MG: 325 TABLET ORAL at 03:40

## 2017-10-14 RX ADMIN — IPRATROPIUM BROMIDE AND ALBUTEROL SULFATE SCH ML: .5; 3 SOLUTION RESPIRATORY (INHALATION) at 10:45

## 2017-10-14 RX ADMIN — ACETAMINOPHEN PRN MG: 325 TABLET ORAL at 09:05

## 2017-10-14 RX ADMIN — IPRATROPIUM BROMIDE AND ALBUTEROL SULFATE SCH ML: .5; 3 SOLUTION RESPIRATORY (INHALATION) at 14:24

## 2017-10-14 RX ADMIN — IPRATROPIUM BROMIDE AND ALBUTEROL SULFATE SCH ML: .5; 3 SOLUTION RESPIRATORY (INHALATION) at 02:35

## 2017-10-14 RX ADMIN — IPRATROPIUM BROMIDE AND ALBUTEROL SULFATE SCH ML: .5; 3 SOLUTION RESPIRATORY (INHALATION) at 18:44

## 2017-10-14 RX ADMIN — Medication SCH ML: at 15:34

## 2017-10-14 RX ADMIN — LEVOTHYROXINE SODIUM SCH MCG: 0.09 TABLET ORAL at 06:16

## 2017-10-14 RX ADMIN — Medication SCH ML: at 20:23

## 2017-10-14 RX ADMIN — DOCUSATE SODIUM SCH MG: 100 CAPSULE ORAL at 09:05

## 2017-10-14 RX ADMIN — ACETAMINOPHEN PRN MG: 325 TABLET ORAL at 23:58

## 2017-10-14 RX ADMIN — FLUTICASONE PROPIONATE AND SALMETEROL XINAFOATE SCH PUFF: 115; 21 AEROSOL, METERED RESPIRATORY (INHALATION) at 18:44

## 2017-10-14 RX ADMIN — FLUTICASONE PROPIONATE AND SALMETEROL XINAFOATE SCH PUFF: 115; 21 AEROSOL, METERED RESPIRATORY (INHALATION) at 06:58

## 2017-10-14 NOTE — PROGRESS NOTE-HOSPITALIST
Progress Note


Progress Notes/Assess & Plan


Date Seen


10/14/17


Time Seen by Provider:  13:00


Diagonsis/Assessment & Plan


Chart Review:


No fever


Vitals stable except BP elevated


Reviewed meds





Patient Interview:


Pt is feeling okay and denies experiencing pain, unless she gets up


Pt confirms having BMs


Physical exam stable. Lung sound perfect





AFVSS, pleasant, O x 3, frail, thin


RRR, CTAB diminished in all fields





Assessment:


1. left intertrochanteric hip fracture


2.  Pelvic fracture


3.  Emphysema  O2 dependent


4.  history of hypertension


5.  Osteoporosis


6.  History of renal cell cancer no evidence of disease status post nephrectomy


7.  Frailty








Plan:


Monitor pt


PT/OT


Vapotherm


Poor prognosis





Scribed by Marcia Keith under the direct supervision of Dr. Bland.











LAILA BLAND DO Oct 14, 2017 13:41

## 2017-10-14 NOTE — PHYSICAL THERAPY DAILY NOTE
PT Daily Note-Current


Subjective


Pt in bed, agreeable to PT. Declined exercises; "I want to get up to that chair 

and drink my coffee!" RN okay with up to chair as tolerated. Pt denied pain.





Mental Status


Patient Orientation:  Person, Place, Time, Situation


Attachments:  Oxygen





Transfers


              Functional [place] Measure


0=Not Assessed/NA   4=Minimal Assistance


1=Total Assistance   5=Supervision or Setup


2=Maximal Assistance   6=Modified [place]


3=Moderate Assistance   7=Complete IndependenceIRFPAI Quality Coding Scale











6 Independent with activity with or without an assistive device


 


5  Patient requires set up or clean up by helper.  Patient completes activity  

by  themselves


 


4 Supervision or touching assist (CGA). [place] provide cues , steadying assist


 


3 The helper provides less than half the effort to complete the activity


 


2 The helper provides more than half the effort to complete the activity


 


1 Dependent.  The helper does all the effort to complete an activity 


 


7 Patient refused to complete or attempt activity


 


9 The patient did not perform the activity before the current illness or injury


 


88 Not attempted due to Medical conditions or safety concerns








Supine to/from Sit:  5


Sit to/from Stand:  4


Sit to Stand (QC):  4


Chair/Bed-to-Chair Xfer(QC):  3


Bed to/from Chair:  4





Weight Bearing


Right Lower Extremity:  Right


Full Weight Bearing


Left Lower Extremity:  Left


Touch Toe Bearing





Gait Training


Does the Patient Walk?:  No and Walking Goal IS indicated


Gait (FIM):  1


Distance (FIM):  1=up to 49 ft


Distance:  3'


Gait Level of Assist:  4


Gait Persons Needed:  1


Gait Assistive Device:  FWW


Max skilled VCS for TTWB on (L) LE. Pt does not consistently follow cues for 

TTWB on (L) LE with bed->chair TFR. Shuffles feet to compete TFR. Max skilled 

VCS for safety and complete TFR before attempting to sit.





Treatments


Transfer training. O2 sats decreased with TFR but recovered to 89-90% almost 

immediately upon return to sitting up in chair. Pt up in chair with chair alarm 

active, needs met, nursing aware of Pt position.





Assessment


Current Status:  Fair Progress


Pt tolerated fair. Difficulty maintaining TTWB on (L) LE and with significant 

desat with TFR but almost immediate recovery upon return to sitting.





PT Long Term Goals


Long Term Goals


PT Long Term Goals Time Frame:  2017


Transfers (B,C,W/C) (FIM):  5


Sit to Lying (QC):  4


Lying-Sitting on Side/Bed(QC):  4


Sit to Stand (QC):  4


Rollin


Chair/Bed-to-Chair Xfer(QC):  4


Does the Patient Walk:  No and Walking Goal IS indicated


Gait (FIM):  1 (TTWB left LE)


Gait distance (FIM):  1=up to 49 ft


Distance:  25'


Walk 50ft with 2 Turns (QC):  88


Walk 150 ft (QC):  88


Gait Level of Assist:  4


Gait Assistive Device:  FWW





PT Plan


Problem List


Problem List:  Activity Tolerance, Functional Strength, Safety, Balance, Gait, 

Transfer, Bed Mobility





Treatment/Plan


Treatment Plan:  Continue Plan of Care


Treatment Plan:  Bed Mobility, Education, Functional Activity Jennifer, Functional 

Strength, Gait, Safety, Therapeutic Exercise, Transfers


Treatment Duration:  2017


Frequency:  11 times per week


Estimated Hrs Per Day:  .5 hour per day


Patient and/or Family Agrees t:  Yes





Safety Risks/Education


Patient Education:  Transfer Techniques, Safety Issues


Teaching Recipient:  Patient


Teaching Methods:  Discussion


Response to Teaching:  Reinforcement Needed





Discharge Recommendations


Barriers to Progress


O2 desaturation with activity.





Time/GCodes


Time In:  0832


Time Out:  0850


Total Billed Treatment Time:  18


Total Billed Treatment


1, FA x 18'


G Codes Necessary:  STEVO Connor DPALEKSEY Oct 14, 2017 09:48

## 2017-10-15 VITALS — SYSTOLIC BLOOD PRESSURE: 133 MMHG | DIASTOLIC BLOOD PRESSURE: 59 MMHG

## 2017-10-15 VITALS — SYSTOLIC BLOOD PRESSURE: 132 MMHG | DIASTOLIC BLOOD PRESSURE: 59 MMHG

## 2017-10-15 VITALS — SYSTOLIC BLOOD PRESSURE: 139 MMHG | DIASTOLIC BLOOD PRESSURE: 64 MMHG

## 2017-10-15 VITALS — SYSTOLIC BLOOD PRESSURE: 171 MMHG | DIASTOLIC BLOOD PRESSURE: 74 MMHG

## 2017-10-15 VITALS — DIASTOLIC BLOOD PRESSURE: 64 MMHG | SYSTOLIC BLOOD PRESSURE: 164 MMHG

## 2017-10-15 VITALS — SYSTOLIC BLOOD PRESSURE: 169 MMHG | DIASTOLIC BLOOD PRESSURE: 68 MMHG

## 2017-10-15 VITALS — DIASTOLIC BLOOD PRESSURE: 62 MMHG | SYSTOLIC BLOOD PRESSURE: 146 MMHG

## 2017-10-15 LAB
ALBUMIN SERPL-MCNC: 3.3 GM/DL (ref 3.2–4.5)
ALT SERPL-CCNC: 8 U/L (ref 0–55)
ANION GAP SERPL CALC-SCNC: 10 MMOL/L (ref 5–14)
AST SERPL-CCNC: 18 U/L (ref 5–34)
BASOPHILS # BLD AUTO: 0 10^3/UL (ref 0–0.1)
BASOPHILS NFR BLD AUTO: 1 % (ref 0–10)
BILIRUB SERPL-MCNC: 0.6 MG/DL (ref 0.1–1)
BUN SERPL-MCNC: 17 MG/DL (ref 7–18)
BUN/CREAT SERPL: 19
CALCIUM SERPL-MCNC: 9.4 MG/DL (ref 8.5–10.1)
CHLORIDE SERPL-SCNC: 106 MMOL/L (ref 98–107)
CO2 SERPL-SCNC: 23 MMOL/L (ref 21–32)
CREAT SERPL-MCNC: 0.9 MG/DL (ref 0.6–1.3)
EOSINOPHIL # BLD AUTO: 0.3 10^3/UL (ref 0–0.3)
EOSINOPHIL NFR BLD AUTO: 4 % (ref 0–10)
ERYTHROCYTE [DISTWIDTH] IN BLOOD BY AUTOMATED COUNT: 16.1 % (ref 10–14.5)
GFR SERPLBLD BASED ON 1.73 SQ M-ARVRAT: 59 ML/MIN
GLUCOSE SERPL-MCNC: 86 MG/DL (ref 70–105)
LYMPHOCYTES # BLD AUTO: 1.8 X 10^3 (ref 1–4)
LYMPHOCYTES NFR BLD AUTO: 25 % (ref 12–44)
MCH RBC QN AUTO: 28 PG (ref 25–34)
MCHC RBC AUTO-ENTMCNC: 31 G/DL (ref 32–36)
MCV RBC AUTO: 90 FL (ref 80–99)
MONOCYTES # BLD AUTO: 0.5 X 10^3 (ref 0–1)
MONOCYTES NFR BLD AUTO: 7 % (ref 0–12)
NEUTROPHILS # BLD AUTO: 4.7 X 10^3 (ref 1.8–7.8)
NEUTROPHILS NFR BLD AUTO: 64 % (ref 42–75)
PLATELET # BLD: 506 10^3/UL (ref 130–400)
PMV BLD AUTO: 9.9 FL (ref 7.4–10.4)
POTASSIUM SERPL-SCNC: 4.6 MMOL/L (ref 3.6–5)
PROT SERPL-MCNC: 5.9 GM/DL (ref 6.4–8.2)
RBC # BLD AUTO: 3.14 10^6/UL (ref 4.35–5.85)
SODIUM SERPL-SCNC: 139 MMOL/L (ref 135–145)
WBC # BLD AUTO: 7.3 10^3/UL (ref 4.3–11)

## 2017-10-15 RX ADMIN — APIXABAN SCH MG: 2.5 TABLET, FILM COATED ORAL at 08:32

## 2017-10-15 RX ADMIN — IPRATROPIUM BROMIDE AND ALBUTEROL SULFATE SCH ML: .5; 3 SOLUTION RESPIRATORY (INHALATION) at 07:50

## 2017-10-15 RX ADMIN — ACETAMINOPHEN PRN MG: 325 TABLET ORAL at 23:12

## 2017-10-15 RX ADMIN — APIXABAN SCH MG: 2.5 TABLET, FILM COATED ORAL at 20:32

## 2017-10-15 RX ADMIN — FLUTICASONE PROPIONATE AND SALMETEROL XINAFOATE SCH PUFF: 115; 21 AEROSOL, METERED RESPIRATORY (INHALATION) at 07:50

## 2017-10-15 RX ADMIN — DILTIAZEM HYDROCHLORIDE SCH MG: 180 CAPSULE, COATED, EXTENDED RELEASE ORAL at 08:32

## 2017-10-15 RX ADMIN — ALPRAZOLAM SCH MG: 0.25 TABLET ORAL at 20:33

## 2017-10-15 RX ADMIN — DOCUSATE SODIUM SCH MG: 100 CAPSULE ORAL at 08:32

## 2017-10-15 RX ADMIN — Medication SCH ML: at 17:16

## 2017-10-15 RX ADMIN — IPRATROPIUM BROMIDE AND ALBUTEROL SULFATE SCH ML: .5; 3 SOLUTION RESPIRATORY (INHALATION) at 14:32

## 2017-10-15 RX ADMIN — IPRATROPIUM BROMIDE AND ALBUTEROL SULFATE SCH ML: .5; 3 SOLUTION RESPIRATORY (INHALATION) at 18:25

## 2017-10-15 RX ADMIN — IPRATROPIUM BROMIDE AND ALBUTEROL SULFATE SCH ML: .5; 3 SOLUTION RESPIRATORY (INHALATION) at 22:26

## 2017-10-15 RX ADMIN — DOCUSATE SODIUM SCH MG: 100 CAPSULE ORAL at 20:33

## 2017-10-15 RX ADMIN — FLUTICASONE PROPIONATE AND SALMETEROL XINAFOATE SCH PUFF: 115; 21 AEROSOL, METERED RESPIRATORY (INHALATION) at 18:25

## 2017-10-15 RX ADMIN — PANTOPRAZOLE SODIUM SCH MG: 40 TABLET, DELAYED RELEASE ORAL at 06:02

## 2017-10-15 RX ADMIN — NICOTINE SCH MG: 7 PATCH, EXTENDED RELEASE TRANSDERMAL at 17:16

## 2017-10-15 RX ADMIN — Medication SCH ML: at 06:03

## 2017-10-15 RX ADMIN — IPRATROPIUM BROMIDE AND ALBUTEROL SULFATE SCH ML: .5; 3 SOLUTION RESPIRATORY (INHALATION) at 10:53

## 2017-10-15 RX ADMIN — Medication SCH ML: at 20:33

## 2017-10-15 RX ADMIN — LEVOTHYROXINE SODIUM SCH MCG: 0.09 TABLET ORAL at 06:02

## 2017-10-15 RX ADMIN — IPRATROPIUM BROMIDE AND ALBUTEROL SULFATE SCH ML: .5; 3 SOLUTION RESPIRATORY (INHALATION) at 03:10

## 2017-10-15 NOTE — PROGRESS NOTE-HOSPITALIST
Progress Note


Progress Notes/Assess & Plan


Date Seen


10/15/17


Time Seen by Provider:  11:00


Diagonsis/Assessment & Plan


Chart Review:


No fever


Vitals stable


WBC 7.3


Hgb 8.7


CMP normal


Still on Vapotherm





Patient Interview:


Pt states she is doing well and would like to DC


Pt states she has been eating and having BMs


Labs look good and this was discussed.


Physical exam stable. Lungs sound perfect


I informed the pt that tomorrow I will reduce her O2 so we can get her off of 

it and hopefully let the pt DC





AFVSS, pleasant, O x 3, frail, thin, about the same, sitting in chair, has 

visitor


RRR, CTAB diminished in all fields





Assessment:


1. left intertrochanteric hip fracture


2.  Pelvic fracture


3.  Emphysema  O2 dependent


4.  history of hypertension


5.  Osteoporosis


6.  History of renal cell cancer no evidence of disease status post nephrectomy


7.  Frailty








Plan:


Monitor pt


PT/OT


Vapotherm


Poor prognosis





Scribed by Marcia Keith under the direct supervision of Dr. Bland.











LAILA BLAND DO Oct 15, 2017 13:03

## 2017-10-16 VITALS — SYSTOLIC BLOOD PRESSURE: 149 MMHG | DIASTOLIC BLOOD PRESSURE: 63 MMHG

## 2017-10-16 VITALS — DIASTOLIC BLOOD PRESSURE: 56 MMHG | SYSTOLIC BLOOD PRESSURE: 118 MMHG

## 2017-10-16 VITALS — SYSTOLIC BLOOD PRESSURE: 163 MMHG | DIASTOLIC BLOOD PRESSURE: 69 MMHG

## 2017-10-16 RX ADMIN — IPRATROPIUM BROMIDE AND ALBUTEROL SULFATE SCH ML: .5; 3 SOLUTION RESPIRATORY (INHALATION) at 02:26

## 2017-10-16 RX ADMIN — IPRATROPIUM BROMIDE AND ALBUTEROL SULFATE SCH ML: .5; 3 SOLUTION RESPIRATORY (INHALATION) at 07:21

## 2017-10-16 RX ADMIN — ACETAMINOPHEN PRN MG: 325 TABLET ORAL at 06:16

## 2017-10-16 RX ADMIN — NICOTINE SCH MG: 7 PATCH, EXTENDED RELEASE TRANSDERMAL at 14:41

## 2017-10-16 RX ADMIN — DILTIAZEM HYDROCHLORIDE SCH MG: 180 CAPSULE, COATED, EXTENDED RELEASE ORAL at 08:35

## 2017-10-16 RX ADMIN — Medication SCH ML: at 21:04

## 2017-10-16 RX ADMIN — Medication SCH ML: at 06:16

## 2017-10-16 RX ADMIN — PANTOPRAZOLE SODIUM SCH MG: 40 TABLET, DELAYED RELEASE ORAL at 06:16

## 2017-10-16 RX ADMIN — LEVOTHYROXINE SODIUM SCH MCG: 0.09 TABLET ORAL at 06:16

## 2017-10-16 RX ADMIN — APIXABAN SCH MG: 2.5 TABLET, FILM COATED ORAL at 08:35

## 2017-10-16 RX ADMIN — FLUTICASONE PROPIONATE AND SALMETEROL XINAFOATE SCH PUFF: 115; 21 AEROSOL, METERED RESPIRATORY (INHALATION) at 18:36

## 2017-10-16 RX ADMIN — IPRATROPIUM BROMIDE AND ALBUTEROL SULFATE SCH ML: .5; 3 SOLUTION RESPIRATORY (INHALATION) at 14:10

## 2017-10-16 RX ADMIN — IPRATROPIUM BROMIDE AND ALBUTEROL SULFATE SCH ML: .5; 3 SOLUTION RESPIRATORY (INHALATION) at 18:35

## 2017-10-16 RX ADMIN — DOCUSATE SODIUM SCH MG: 100 CAPSULE ORAL at 08:25

## 2017-10-16 RX ADMIN — Medication SCH ML: at 14:36

## 2017-10-16 RX ADMIN — ALPRAZOLAM SCH MG: 0.25 TABLET ORAL at 21:04

## 2017-10-16 RX ADMIN — IPRATROPIUM BROMIDE AND ALBUTEROL SULFATE SCH ML: .5; 3 SOLUTION RESPIRATORY (INHALATION) at 11:14

## 2017-10-16 RX ADMIN — APIXABAN SCH MG: 2.5 TABLET, FILM COATED ORAL at 21:04

## 2017-10-16 RX ADMIN — IPRATROPIUM BROMIDE AND ALBUTEROL SULFATE SCH ML: .5; 3 SOLUTION RESPIRATORY (INHALATION) at 22:33

## 2017-10-16 RX ADMIN — FLUTICASONE PROPIONATE AND SALMETEROL XINAFOATE SCH PUFF: 115; 21 AEROSOL, METERED RESPIRATORY (INHALATION) at 07:22

## 2017-10-16 RX ADMIN — DOCUSATE SODIUM SCH MG: 100 CAPSULE ORAL at 20:12

## 2017-10-16 NOTE — PHYSICAL THERAPY DAILY NOTE
PT Daily Note-Current


Subjective


Patient is up in recliner and reports she is tired.  Agrees to PT.





Pain





   Numeric Pain Scale:  5-Moderate Pain


   Location:  Left


   Location Body Site:  Hip


   Pain Description:  Ache, Acute


   Comment:  FLACC





Mental Status


Patient Orientation:  Normal For Age


Attachments:  Oxygen (vapotherm 40% at 13L)





Transfers


              Functional [place] Measure


0=Not Assessed/NA   4=Minimal Assistance


1=Total Assistance   5=Supervision or Setup


2=Maximal Assistance   6=Modified [place]


3=Moderate Assistance   7=Complete IndependenceIRFPAI Quality Coding Scale











6 Independent with activity with or without an assistive device


 


5  Patient requires set up or clean up by helper.  Patient completes activity  

by  themselves


 


4 Supervision or touching assist (CGA). [place] provide cues , steadying assist


 


3 The helper provides less than half the effort to complete the activity


 


2 The helper provides more than half the effort to complete the activity


 


1 Dependent.  The helper does all the effort to complete an activity 


 


7 Patient refused to complete or attempt activity


 


9 The patient did not perform the activity before the current illness or injury


 


88 Not attempted due to Medical conditions or safety concerns








Transfers (B, C, W/C) (FIM):  5


Scootin


Sit to/from Stand:  5


Patient has difficulty with performing TTWB left LE with sit to stand transfer 

to FWW.  She is,however, able to maintain status in stand with standing 

exercises.





Weight Bearing


Right Lower Extremity:  Right


Full Weight Bearing


Left Lower Extremity:  Left


Touch Toe Bearing





Exercises


Supine Ex:  Ankle pumps, Quad Set, Heel Slides, Hip abd/add


Supine Reps:  15 (2 sets)


Seated Therapy Exercises:  Ankle pumps, Long arc quads, Hip flexion (AAROM)


Seated Reps:  15 (2 sets)


Standing:  3 way Ex=Flex, Abd, Ext ( - extension)


Standing Reps:  15 (2 sets left LE only due to TTWB left LE)





Assessment


During standing exercises, patient SAO2 decreases to 75% on vapotherm, 

requiring time to recover in sit.  Patient consistently desats in standing 

activities.  Dr. go.





PT Long Term Goals


Long Term Goals


PT Long Term Goals Time Frame:  2017


Transfers (B,C,W/C) (FIM):  5


Gait (FIM):  1 (TTWB left LE)


Gait distance (FIM):  1=up to 49 ft


Distance:  25'


Gait Level of Assist:  4


Gait Assistive Device:  FWW





PT Plan


Treatment/Plan


Treatment Plan:  Continue Plan of Care


Treatment Plan:  Bed Mobility, Education, Functional Activity Jennifer, Functional 

Strength, Gait, Safety, Therapeutic Exercise, Transfers


Treatment Duration:  2017


Frequency:  11 times per week


Estimated Hrs Per Day:  .5 hour per day


Patient and/or Family Agrees t:  Yes





Time/GCodes


Time In:  955


Time Out:  1015


Total Billed Treatment Time:  20


Total Billed Treatment


1 visit


EX 20 min











MELIDA COOPER PT Oct 16, 2017 11:29

## 2017-10-16 NOTE — PULMONARY PROGRESS NOTE
Subjective


Time Seen by Provider:  08:42





Exam


Exam





Vital Signs








  Date Time  Temp Pulse Resp B/P (MAP) Pulse Ox O2 Delivery O2 Flow Rate FiO2


 


10/16/17 07:26     96 Vapotherm 13.00 40


 


10/16/17 07:21     93 Vapotherm 23.00 40


 


10/16/17 03:44 98.5 81 20 163/69 92 Vapotherm 45.00 





       23.00 


 


10/16/17 02:27     93 Vapotherm 23.00 45


 


10/16/17 01:00  73      


 


10/15/17 23:13 98.4 84 20 171/74 92 Vapotherm 45.00 





       23.00 


 


10/15/17 22:26     91 Vapotherm 23.00 40


 


10/15/17 20:40     93 Vapotherm 23.00 40


 


10/15/17 19:33 98.9 86 22 146/62 93 Vapotherm 40.00 





       23.00 


 


10/15/17 19:00  84      


 


10/15/17 18:38     90 Vapotherm 20.00 40


 


10/15/17 18:31     97 Vapotherm 23.00 40


 


10/15/17 15:51       15.00 35


 


10/15/17 15:41 98.4 80 22 132/59 93 Vapotherm 40.00 





       23.00 


 


10/15/17 14:32     94 Vapotherm 20.00 40


 


10/15/17 12:44  63      


 


10/15/17 12:00 98.2 78 20 133/59 91 Vapotherm 40.00 





       20.00 


 


10/15/17 10:53     93 Vapotherm 20.00 40











Results


Lab


Laboratory Tests


10/15/17 05:45











Assessment/Plan


Assessment/Plan


Intertrochanteric fracture of left femur- S/P fall


   -Pain management - 


Acute respiratory failure  distress-- now improved 


      -PRN BiPAP 


      -vapor therm continue to titrate. 


s/p Acute SVT 





Pulmonary edema 


   -monitor 


   -repeat BNP


COPD/emphysema hx 


   -SVNS 


   -Oxygen 


Atelectasis 


   -IS x 10breaths Q1hr WA


   -Increase activity


   -PT/OT 








Tobacco dependance 


   -education 


   -nicotine patch 


   


Hx of renal cell carcinoma s/p R nephrectomy 


 








232





Clinical Quality Measures


DVT/VTE Risk/Contraindication:


Risk Factor Score Per Nursin











TIO TORIBIO DO Oct 16, 2017 08:42

## 2017-10-16 NOTE — OCCUPATIONAL THER DAILY NOTE
OT Current Status-Daily Note


Subjective


Pt sleeping in recliner, woke easily to name.  Pt agreed to therapy.  Pt stated 

that she was tired.  No c/o pain.





Mental Status/Objective


Patient Orientation:  Person, Place, Time, Situation


              Functional [place] Measure


0=Not Assessed/NA   4=Minimal Assistance


1=Total Assistance   5=Supervision or Setup


2=Maximal Assistance   6=Modified [place]


3=Moderate Assistance   7=Complete [place]


Attachments:  IV, Oxygen, Telemetry





ADL-Treatment


              Functional [place] Measure


0=Not Assessed/NA   4=Minimal Assistance


1=Total Assistance   5=Supervision or Setup


2=Maximal Assistance   6=Modified [place]


3=Moderate Assistance   7=Complete IndependenceIRFPAI Quality Coding Scale











6 Independent with activity with or without an assistive device


 


5  Patient requires set up or clean up by helper.  Patient completes activity  

by  themselves


 


4 Supervision or touching assist (CGA). [place] provide cues , steadying assist


 


3 The helper provides less than half the effort to complete the activity


 


2 The helper provides more than half the effort to complete the activity


 


1 Dependent.  The helper does all the effort to complete an activity 


 


7 Patient refused to complete or attempt activity


 


9 The patient did not perform the activity before the current illness or injury


 


88 Not attempted due to Medical conditions or safety concerns








Grooming (FIM):  5 (Supplies placed in front of pt while sitting in recliner.  

Pt able to complete all grooming by self.)


Oral Hygiene (QC):  5


Toileting (FIM):  5 (Pt able to cleanse self sitting on toilet.  Manipulates 

hospital gown by self.)


Toileting Hygiene (QC):  4


Transfers (B, C, W/C) (FIM):  4 (Using FWW, pt is able to complete with CGA.)


Toilet/Commode Transfer (FIM):  4 (CGA to manage tubing then using FWW was able 

to transfer from recliner to [facility]. )


Toilet Transfer (QC):  4


After therapy session, pt sitting in recliner with call light/phone in reach.  

All needs met in room.





OT Short Term Goals


Short Term Goals


1=Demonstrate adherence to instructed precautions during ADL tasks.


2=Patient will verbalize/demonstrate understanding of assistive devices/

modifications for ADL.


3=Patient will improve strength/tolerance for activity to enable patient to 

perform ADL's.





OT Long Term Goals


Long Term Goals


Time Frame:  Nov 3, 2017


Eating (FIM):  6


Eating (QC):  6


Groomin


Oral Hygiene (QC):  6


Upper Body Dressing(FIM):  5


Lower Body Dressing(FIM):  4


Toileting(FIM):  5


Toileting Hygiene (QC):  5


Toilet/Commode Transfer(FIM):  5


Additional Goals:  1-Demonstrate ADL Tasks, 2-Verbalize Understanding, 3-

ImproveStrength/Jennifer


1=Demonstrate adherence to instructed precautions during ADL tasks.


2=Patient will verbalize/demonstrate understanding of assistive devices/

modifications for ADL.


3=Patient will improve strength/tolerance for activity to enable patient to 

perform ADL's.





OT Education/Plan


Problem List/Assessment


Pt admitted to [facility] status following acute hospitalization for left hip 

fracture. Pt demonstrates decreased ADL functioning, activity tolerance, 

strength, and mobility. Pt to benefit from skilled OT intervention for ADL 

training, transfers, strengthening, adaptive equipment training, and home 

safety education to maximize level of function and allow safe discharge plan.





Discharge Recommendations


Plan/Recommendations:  Continue POC





Treatment Plan/Plan of Care


Patient would benefit from OT for education, treatment and training to promote 

independence in ADL's, mobility, safety and/or upper extremity function for ADL'

s.


Plan of Care:  ADL Retraining, Functional Mobility, UE Funct Exercise/Act


Treatment Duration:  Nov 3, 2017


Frequency:  5 times per week


Estimated Hrs Per Day:  .25 hour per day


Agreement:  Yes


Rehab Potential:  Fair





Time/GCodes


Start Time:  09:15


Stop Time:  09:41


Total Time Billed (hr/min):  26


Billed Treatment Time


1 visit-FA 2 (26 min)











RABIA CORTEZ Oct 16, 2017 09:51

## 2017-10-16 NOTE — PHYSICAL THERAPY DAILY NOTE
PT Daily Note-Current


Subjective


Patient sitting in recliner and agrees to PT.  Patient is on 10L O2 HF NC.





Pain





   Numeric Pain Scale:  5-Moderate Pain


   Location:  Left


   Location Body Site:  Hip


   Pain Description:  Ache





Mental Status


Patient Orientation:  Normal For Age


Attachments:  Oxygen (10L HF NC)





Transfers


              Functional [place] Measure


0=Not Assessed/NA   4=Minimal Assistance


1=Total Assistance   5=Supervision or Setup


2=Maximal Assistance   6=Modified [place]


3=Moderate Assistance   7=Complete IndependenceIRFPAI Quality Coding Scale











6 Independent with activity with or without an assistive device


 


5  Patient requires set up or clean up by helper.  Patient completes activity  

by  themselves


 


4 Supervision or touching assist (CGA). [place] provide cues , steadying assist


 


3 The helper provides less than half the effort to complete the activity


 


2 The helper provides more than half the effort to complete the activity


 


1 Dependent.  The helper does all the effort to complete an activity 


 


7 Patient refused to complete or attempt activity


 


9 The patient did not perform the activity before the current illness or injury


 


88 Not attempted due to Medical conditions or safety concerns








Transfers (B, C, W/C) (FIM):  5


Scootin


Sit to/from Stand:  5


Sit to Stand (QC):  4


close SBA for safety





Weight Bearing


Right Lower Extremity:  Right


Full Weight Bearing


Left Lower Extremity:  Left


Touch Toe Bearing





Exercises


Seated Therapy Exercises:  Long arc quads


Seated Reps:  25 (2 sets bilaterally)


Standing:  3 way Ex=Flex, Abd, Ext (- extension; left LE only), Marching (left 

LE only)





Assessment


Patient continues to display decrease in SAO2 with standing activity.  Patient 

is SOA with minimal activity.  RT notified.





PT Long Term Goals


Long Term Goals


PT Long Term Goals Time Frame:  2017


Transfers (B,C,W/C) (FIM):  5


Sit to Lying (QC):  4


Lying-Sitting on Side/Bed(QC):  4


Sit to Stand (QC):  4


Rollin


Chair/Bed-to-Chair Xfer(QC):  4


Does the Patient Walk:  No and Walking Goal IS indicated


Gait (FIM):  1 (TTWB left LE)


Gait distance (FIM):  1=up to 49 ft


Distance:  25'


Walk 50ft with 2 Turns (QC):  88


Walk 150 ft (QC):  88


Gait Level of Assist:  4


Gait Assistive Device:  FWW





PT Plan


Treatment/Plan


Treatment Plan:  Continue Plan of Care


Treatment Plan:  Bed Mobility, Education, Functional Activity Jennifer, Functional 

Strength, Gait, Safety, Therapeutic Exercise, Transfers


Treatment Duration:  2017


Frequency:  11 times per week


Estimated Hrs Per Day:  .5 hour per day


Patient and/or Family Agrees t:  Yes





Time/GCodes


Time In:  1315


Time Out:  1331


Total Billed Treatment Time:  16


Total Billed Treatment


1 visit


EX 16 min











MELIDA COOPER PT Oct 16, 2017 13:40

## 2017-10-16 NOTE — DIAGNOSTIC IMAGING REPORT
Portable upright radiograph of the chest.



INDICATION: Shortness of breath.



COMPARISON: 10/12/2017.



FINDINGS:  

The lungs are hyperinflated. There is atelectasis or infiltrate

in the lung bases with a small right effusion minimally improved

compared to 10/12/17. The heart size is at the upper limits of

normal. No pneumothorax.



IMPRESSION: COPD. Bibasilar opacities are favored to relate to

atelectasis and appear to be slightly improved compared to the

previous exam.



Dictated by: 



  Dictated on workstation # [id number]

## 2017-10-17 VITALS — DIASTOLIC BLOOD PRESSURE: 58 MMHG | SYSTOLIC BLOOD PRESSURE: 129 MMHG

## 2017-10-17 RX ADMIN — ACETAMINOPHEN PRN MG: 325 TABLET ORAL at 05:34

## 2017-10-17 RX ADMIN — DILTIAZEM HYDROCHLORIDE SCH MG: 180 CAPSULE, COATED, EXTENDED RELEASE ORAL at 08:41

## 2017-10-17 RX ADMIN — IPRATROPIUM BROMIDE AND ALBUTEROL SULFATE SCH ML: .5; 3 SOLUTION RESPIRATORY (INHALATION) at 11:32

## 2017-10-17 RX ADMIN — Medication SCH ML: at 05:34

## 2017-10-17 RX ADMIN — IPRATROPIUM BROMIDE AND ALBUTEROL SULFATE SCH ML: .5; 3 SOLUTION RESPIRATORY (INHALATION) at 03:54

## 2017-10-17 RX ADMIN — DOCUSATE SODIUM SCH MG: 100 CAPSULE ORAL at 08:42

## 2017-10-17 RX ADMIN — FLUTICASONE PROPIONATE AND SALMETEROL XINAFOATE SCH PUFF: 115; 21 AEROSOL, METERED RESPIRATORY (INHALATION) at 07:41

## 2017-10-17 RX ADMIN — LEVOTHYROXINE SODIUM SCH MCG: 0.09 TABLET ORAL at 05:34

## 2017-10-17 RX ADMIN — APIXABAN SCH MG: 2.5 TABLET, FILM COATED ORAL at 08:41

## 2017-10-17 RX ADMIN — PANTOPRAZOLE SODIUM SCH MG: 40 TABLET, DELAYED RELEASE ORAL at 05:34

## 2017-10-17 RX ADMIN — IPRATROPIUM BROMIDE AND ALBUTEROL SULFATE SCH ML: .5; 3 SOLUTION RESPIRATORY (INHALATION) at 07:39

## 2017-10-17 NOTE — PHYSICAL THERAPY DAILY NOTE
PT Daily Note-Current


Subjective


This a.m., patient reports that she is tired and sore from PT yesterday. She is 

at first resistive to PT, but eventually agrees to PT and performs all modes of 

therapy successfully. Patient is now on 6.0 L of oxygen. O2 sat readings are 

controversial based on separate findings. Patient did not appear as fatigued as 

the dropping O2 sats on one monitor revealed.





Pain





   Numeric Pain Scale:  5-Moderate Pain


   Location:  Left


   Location Body Site:  Hip


   Pain Description:  Ache, Dull





Appearance


Patient demonstrated a generally healthy appearance upon beginning of PT and 

with activity.





Mental Status


Patient Orientation:  Normal For Age


Attachments:  Oxygen


6.0 L of oxygen





Transfers


              Functional [place] Measure


0=Not Assessed/NA   4=Minimal Assistance


1=Total Assistance   5=Supervision or Setup


2=Maximal Assistance   6=Modified [place]


3=Moderate Assistance   7=Complete IndependenceIRFPAI Quality Coding Scale











6 Independent with activity with or without an assistive device


 


5  Patient requires set up or clean up by helper.  Patient completes activity  

by  themselves


 


4 Supervision or touching assist (CGA). [place] provide cues , steadying assist


 


3 The helper provides less than half the effort to complete the activity


 


2 The helper provides more than half the effort to complete the activity


 


1 Dependent.  The helper does all the effort to complete an activity 


 


7 Patient refused to complete or attempt activity


 


9 The patient did not perform the activity before the current illness or injury


 


88 Not attempted due to Medical conditions or safety concerns








Transfers (B, C, W/C) (FIM):  5


Scootin


Sit to/from Stand:  5


Sit to Stand (QC):  4


Patient performed transfers with slight contact or standby assistance from 

therapist.





Weight Bearing


Right Lower Extremity:  Right


Full Weight Bearing


Left Lower Extremity:  Left


Touch Toe Bearing





Gait Training


Gait (FIM):  1


Distance (FIM):  1=up to 49 ft


Distance:  2 ft


Gait Level of Assist:  3


Gait Persons Needed:  1


Gait Assistive Device:  FWW


Patient was educated on how to perform a correct gait sequence with the FWW on 

toe touch weight bearing restrictions. Further walking progression was not made 

to patient being fearful of weight bearing status.





Exercises


Seated Therapy Exercises:  Long arc quads (1 set x 25x bilaterally), Hip 

flexion (1 set x 25x bilaterally)


Standing:  Hip Abduction (1 set x 25x L LE), 3 way Ex=Flex, Abd, Ext (- 

extension 1x 25x left LE), Marching (1 set x 25 reps left LE)


Standing Reps:  25


1 set x 25 reps





Assessment


Current Status:  Good Progress


Patient has good tolerance for physical therapy, however, she does require 

multiple recovery periods due to fatigue. Patient is showing good performance 

with therapeutic activities while on lower oxygen levels. Due to weight bearing 

restrictions and controversial O2 sats, patient may be a candidate for slower 

progression of activities and a potential extended stay or PT for an extended 

period of time.





PT Long Term Goals


Long Term Goals


PT Long Term Goals Time Frame:  2017


Transfers (B,C,W/C) (FIM):  5


Sit to Lying (QC):  4


Lying-Sitting on Side/Bed(QC):  4


Sit to Stand (QC):  4


Rollin


Chair/Bed-to-Chair Xfer(QC):  4


Does the Patient Walk:  No and Walking Goal IS indicated


Gait (FIM):  1 (TTWB left LE)


Gait distance (FIM):  1=up to 49 ft


Distance:  25'


Walk 50ft with 2 Turns (QC):  88


Walk 150 ft (QC):  88


Gait Level of Assist:  4


Gait Assistive Device:  FWW





PT Plan


Treatment/Plan


Treatment Plan:  Continue Plan of Care


Treatment Plan:  Bed Mobility, Education, Functional Activity Jennifer, Functional 

Strength, Gait, Safety, Therapeutic Exercise, Transfers


Treatment Duration:  2017


Frequency:  11 times per week


Estimated Hrs Per Day:  .5 hour per day


Patient and/or Family Agrees t:  Yes





Safety Risks/Education


Patient Education:  Gait Training





Discharge Recommendations


Therapy D/C Recommendations:  Skilled Nursing (TCU/NH)





Time/GCodes


Time In:  835


Time Out:  858


Total Billed Treatment Time:  23


Total Billed Treatment


1 visit


8 min GT


15 min EX











MELIDA COOPER PT Oct 17, 2017 09:34

## 2017-10-17 NOTE — DISCHARGE INSTRUCTIONS
Discharge Instructions


Patient Instructions


Goal/Follow Up Appt:  


Restoration of ambulatory function post hip fracture


[facility]  


Declining condition





Activity & Diet


Discharge Diet:  Regular Diet


Activity as Tolerated:  Yes











LARY ODOM MD Oct 17, 2017 11:55

## 2017-10-17 NOTE — PROGRESS NOTE-HOSPITALIST
Standard Progress Note


Progress Notes/Assess & Plan


Date Seen


10/17/17


Time Seen by Provider:  11:56


Diagnosis


Hip fracture postoperative.  2.severe COPD 3.generalized debility


Assess & Plan/Chief Complaint


The patient is an 86-year-old white female known to me from previous admission.

  She had suffered a left intertrochanteric hip fracture and pelvic fracture 

and a fall at home.  This was operatively repaired.  Her postop course has been 

complicated by her COPD and oxygen requirements.  She has made some progress 

and has been accepted for transfer to the inpatient rehabilitation facility 

today.





Physical exam: The patient appears older than stated age.  She is tachypneic at 

rest.  Lungs show distant breath sounds.  CV is regular without murmur.  Ankles 

show no pedal edema.  She is on 6 L of O2 per nasal cannula.





Impression: Status post operative repair of left intertrochanteric hip 

fracture.  2.severe COPD with chronic oxygen requirement and present high flow 

oxygen requirement.  3.concomitant pelvic fracture.  4.hypertension.  

5.osteoporosis.





Plan: Transfer to [facility] MD Oct 17, 2017 12:00

## 2017-10-17 NOTE — THERAPY TEAM DISCHARGE SUMMARY
Therapy Discharge Summary


Discharge Recommendations


Date of Discharge


Oct 17, 2017 at 12:09


Therapy D/C Recommendations:  Skilled Nursing (TCU/NH)





Occupational Therapy


Pt admitted to [facility] status following acute hospitalization for left hip 

fracture. Skilled OT intervention focused on ADL training and transfers. Pt was 

only seen for one treatment following initial evaluation and long term goals 

were not met. Pt was discharged to ARU for continued care.  D/C SWB OT at this 

time.


Decreased Activ Tolerance, Decreased UE Strength, Dependent Transfers, Impaired 

Self-Care Skills





PT Long Term Goals


Long Term Goals


PT Long Term Goals Time Frame:  2017


Transfers (B,C,W/C) (FIM):  5


Sit to Lying (QC):  4


Lying-Sitting on Side/Bed(QC):  4


Sit to Stand (QC):  4


Rollin


Chair/Bed-to-Chair Xfer(QC):  4


Does the Patient Walk:  No and Walking Goal IS indicated


Gait (FIM):  1 (TTWB left LE)


Gait distance (FIM):  1=up to 49 ft


Distance:  25'


Walk 50ft with 2 Turns (QC):  88


Walk 150 ft (QC):  88


Gait Level of Assist:  4


Gait Assistive Device:  FWW





OT Long Term Goals


Long Term Goals


Time Frame:  Nov 3, 2017


Eating (FIM):  6


Eating (QC):  6


Groomin


Oral Hygiene (QC):  6


Upper Body Dressing(FIM):  5


Lower Body Dressing(FIM):  4


Toileting(FIM):  5


Toileting Hygiene (QC):  5


Toilet/Commode Transfer(FIM):  5


Additional Goals:  1-Demonstrate ADL Tasks, 2-Verbalize Understanding, 3-

ImproveStrength/Jennifer


1=Demonstrate adherence to instructed precautions during ADL tasks.


2=Patient will verbalize/demonstrate understanding of assistive devices/

modifications for ADL.


3=Patient will improve strength/tolerance for activity to enable patient to 

perform ADL's.











ASH OWENS OT Oct 17, 2017 13:16

## 2017-11-12 ENCOUNTER — HOSPITAL ENCOUNTER (OUTPATIENT)
Dept: HOSPITAL 75 - ER | Age: 82
Setting detail: OBSERVATION
LOS: 3 days | Discharge: ANOTHER HOSPITAL | End: 2017-11-15
Attending: FAMILY MEDICINE | Admitting: FAMILY MEDICINE
Payer: MEDICARE

## 2017-11-12 VITALS — DIASTOLIC BLOOD PRESSURE: 81 MMHG | SYSTOLIC BLOOD PRESSURE: 164 MMHG

## 2017-11-12 VITALS — DIASTOLIC BLOOD PRESSURE: 74 MMHG | SYSTOLIC BLOOD PRESSURE: 178 MMHG

## 2017-11-12 VITALS — WEIGHT: 100 LBS | BODY MASS INDEX: 19.63 KG/M2 | HEIGHT: 60 IN

## 2017-11-12 DIAGNOSIS — Z87.81: ICD-10-CM

## 2017-11-12 DIAGNOSIS — Z85.528: ICD-10-CM

## 2017-11-12 DIAGNOSIS — F17.211: ICD-10-CM

## 2017-11-12 DIAGNOSIS — J18.9: Primary | ICD-10-CM

## 2017-11-12 DIAGNOSIS — I11.0: ICD-10-CM

## 2017-11-12 DIAGNOSIS — I50.30: ICD-10-CM

## 2017-11-12 DIAGNOSIS — I48.2: ICD-10-CM

## 2017-11-12 DIAGNOSIS — Z79.899: ICD-10-CM

## 2017-11-12 DIAGNOSIS — Z90.5: ICD-10-CM

## 2017-11-12 DIAGNOSIS — R07.89: ICD-10-CM

## 2017-11-12 DIAGNOSIS — Z79.01: ICD-10-CM

## 2017-11-12 DIAGNOSIS — J44.9: ICD-10-CM

## 2017-11-12 DIAGNOSIS — R92.8: ICD-10-CM

## 2017-11-12 LAB
ALBUMIN SERPL-MCNC: 3.6 GM/DL (ref 3.2–4.5)
ALT SERPL-CCNC: 7 U/L (ref 0–55)
ANION GAP SERPL CALC-SCNC: 10 MMOL/L (ref 5–14)
AST SERPL-CCNC: 12 U/L (ref 5–34)
BASOPHILS # BLD AUTO: 0 10^3/UL (ref 0–0.1)
BASOPHILS NFR BLD AUTO: 0 % (ref 0–10)
BILIRUB SERPL-MCNC: 0.3 MG/DL (ref 0.1–1)
BILIRUB UR QL STRIP: NEGATIVE
BUN SERPL-MCNC: 20 MG/DL (ref 7–18)
BUN/CREAT SERPL: 18
CALCIUM SERPL-MCNC: 9.2 MG/DL (ref 8.5–10.1)
CHLORIDE SERPL-SCNC: 105 MMOL/L (ref 98–107)
CO2 SERPL-SCNC: 27 MMOL/L (ref 21–32)
CREAT SERPL-MCNC: 1.14 MG/DL (ref 0.6–1.3)
EOSINOPHIL # BLD AUTO: 0.1 10^3/UL (ref 0–0.3)
EOSINOPHIL NFR BLD AUTO: 1 % (ref 0–10)
ERYTHROCYTE [DISTWIDTH] IN BLOOD BY AUTOMATED COUNT: 14.8 % (ref 10–14.5)
GFR SERPLBLD BASED ON 1.73 SQ M-ARVRAT: 45 ML/MIN
GLUCOSE SERPL-MCNC: 127 MG/DL (ref 70–105)
HS C REACTIVE PROTEIN: 10.51 MG/DL (ref 0–0.5)
KETONES UR QL STRIP: NEGATIVE
LEUKOCYTE ESTERASE UR QL STRIP: (no result)
LYMPHOCYTES # BLD AUTO: 1.3 X 10^3 (ref 1–4)
LYMPHOCYTES NFR BLD AUTO: 13 % (ref 12–44)
MCH RBC QN AUTO: 27 PG (ref 25–34)
MCHC RBC AUTO-ENTMCNC: 30 G/DL (ref 32–36)
MCV RBC AUTO: 89 FL (ref 80–99)
MONOCYTES # BLD AUTO: 0.9 X 10^3 (ref 0–1)
MONOCYTES NFR BLD AUTO: 9 % (ref 0–12)
NEUTROPHILS # BLD AUTO: 7.7 X 10^3 (ref 1.8–7.8)
NEUTROPHILS NFR BLD AUTO: 77 % (ref 42–75)
NITRITE UR QL STRIP: NEGATIVE
PH UR STRIP: 5 [PH] (ref 5–9)
PLATELET # BLD: 246 10^3/UL (ref 130–400)
PMV BLD AUTO: 10.8 FL (ref 7.4–10.4)
POTASSIUM SERPL-SCNC: 4.1 MMOL/L (ref 3.6–5)
PROT SERPL-MCNC: 6.6 GM/DL (ref 6.4–8.2)
PROT UR QL STRIP: (no result)
RBC # BLD AUTO: 3.89 10^6/UL (ref 4.35–5.85)
SODIUM SERPL-SCNC: 142 MMOL/L (ref 135–145)
SP GR UR STRIP: 1.01 (ref 1.02–1.02)
SQUAMOUS #/AREA URNS HPF: (no result) /HPF
TROPONIN I SERPL-MCNC: < 0.3 NG/ML (ref ?–0.3)
UROBILINOGEN UR-MCNC: NORMAL MG/DL
WBC # BLD AUTO: 10 10^3/UL (ref 4.3–11)
WBC #/AREA URNS HPF: (no result) /HPF

## 2017-11-12 PROCEDURE — 94640 AIRWAY INHALATION TREATMENT: CPT

## 2017-11-12 PROCEDURE — 94664 DEMO&/EVAL PT USE INHALER: CPT

## 2017-11-12 PROCEDURE — 73552 X-RAY EXAM OF FEMUR 2/>: CPT

## 2017-11-12 PROCEDURE — 71250 CT THORAX DX C-: CPT

## 2017-11-12 PROCEDURE — 93005 ELECTROCARDIOGRAM TRACING: CPT

## 2017-11-12 PROCEDURE — 80053 COMPREHEN METABOLIC PANEL: CPT

## 2017-11-12 PROCEDURE — 72170 X-RAY EXAM OF PELVIS: CPT

## 2017-11-12 PROCEDURE — 86141 C-REACTIVE PROTEIN HS: CPT

## 2017-11-12 PROCEDURE — 80048 BASIC METABOLIC PNL TOTAL CA: CPT

## 2017-11-12 PROCEDURE — 85025 COMPLETE CBC W/AUTO DIFF WBC: CPT

## 2017-11-12 PROCEDURE — 93970 EXTREMITY STUDY: CPT

## 2017-11-12 PROCEDURE — 83880 ASSAY OF NATRIURETIC PEPTIDE: CPT

## 2017-11-12 PROCEDURE — 84484 ASSAY OF TROPONIN QUANT: CPT

## 2017-11-12 PROCEDURE — 71010: CPT

## 2017-11-12 PROCEDURE — 81000 URINALYSIS NONAUTO W/SCOPE: CPT

## 2017-11-12 PROCEDURE — 36415 COLL VENOUS BLD VENIPUNCTURE: CPT

## 2017-11-12 PROCEDURE — 94760 N-INVAS EAR/PLS OXIMETRY 1: CPT

## 2017-11-12 NOTE — ED GENERAL
General


Chief Complaint:  Back Problems


Stated Complaint:  BACK OF CHEST PAIN/SOB/L LEG PAIN


Nursing Triage Note:  


PT TO ED 4 W/ C/O RT SIDE BACK PAIN.  PT DENIES CP, DENIES INJURY.  PER FAMILY, 


PT HAS HX OF AFIB ET RECENT HX OF HIP SURGERY.  FAMILY ALSO VOICED CONCERN OF 


SWELLING TO LT FOOT PAIN.  NO OTHER C/O VOICED


Nursing Sepsis Screen:  No Definite Risk


Source of Information:  Patient, Family, Old Records


Exam Limitations:  No Limitations





History of Present Illness


Time Seen by Provider:  19:07


Initial Comments


This 86-year-old woman is brought to the emergency room by her daughter with 

complaints of right lower posterior chest pain.  The pain is worse with deep 

breathing and coughing.  It has been present for several days.  She was started 

on Levaquin about 5 days ago as a precaution because there was concern that 

this pain may be related to pneumonia.  Patient had a maxim placed in her left 

hip after fracture about one month ago.  She has been home about one week after 

rehabilitation from the surgery.  She has had some mild cough and pain is worse 

with deep breathing and cough.  She has been taking only Tylenol because she is 

anticoagulated.  She is also complaining of some pain in the right groin.  

Overall her postoperative pain has improved but in this particular area has 

worsened.  There is also mild swelling in the left lower extremity which is new 

in recent days.  Patient is presently anti-coagulated.





Allergies and Home Medications


Allergies


Coded Allergies:  


     No Known Drug Allergies (Verified , 10/6/14)





Home Medications


Acetaminophen 325 Mg Tablet, 650 MG PO Q4H PRN for PAIN-MILD for 30 Days, #100


   Prescribed by: LISA MARIA on 10/27/17 0936


Albuterol Sulfate 2.5 Mg/3 Ml Vial.neb, 2.5 MG NEB Q4H PRN for SHORTNESS OF 

BREATH, (Reported)


Alprazolam 0.25 Mg Tablet, 0.25 MG PO HS for 30 Days, #30


   Prescribed by: LISA MARIA on 10/27/17 0936


Apixaban 2.5 Mg Tablet, 2.5 MG PO BID for 30 Days, #60


   Prescribed by: LISA MARIA on 10/27/17 0936


Diltiazem HCl 180 Mg Cap.er.24h, 180 MG PO DAILY for 30 Days, #30


   Prescribed by: LISA MARIA on 10/27/17 0936


Fluticasone/Vilanterol 1 Each Blst.w.dev, 1 PUFF IH DAILY PRN for SHORTNESS OF 

BREATH, (Reported)


Levocetirizine Dihydrochloride 5 Mg Tablet, 5 MG PO DAILY, (Reported)


Levothyroxine Sodium 88 Mcg Tablet, 88 MCG PO DAILY, (Reported)


Loperamide HCl 2 Mg Capsule, 2 MG PO UD PRN for DIARRHEA, (Reported)


Loratadine 10 Mg Tablet, 10 MG PO DAILY for 30 Days, #30


   Prescribed by: LISA MARIA on 17


Magnesium Oxide 400 Mg Tablet, 400 MG PO DAILY, (Reported)


Montelukast Sodium 10 Mg Tablet, 10 MG PO DAILY for 30 Days, #30


   Prescribed by: LISA MARIA on 17


Nicotine 1 Each Patch.td24, 7 MG TD DAILY@1400 for 14 Days, #14


   Prescribed by: LISA MARIA on 10/27/17 0936


Omeprazole 40 Mg Capsule.dr, 40 MG PO DAILY, (Reported)


Scopolamine 1 Each Patch.td72, 1.5 MG TOP Q72H for 30 Days, #10


   Prescribed by: LISA MARIA on 10/27/17 0936


Triamcinolone Acetonide 10.8 Ml Spray, 2 SPRAYS NS DAILY, (Reported)





Constitutional:  no symptoms reported


EENTM:  no symptoms reported


Respiratory:  see HPI


Cardiovascular:  no symptoms reported


Gastrointestinal:  no symptoms reported


Genitourinary:  no symptoms reported


Pregnant:  No


Musculoskeletal:  see HPI


Skin:  no symptoms reported


Psychiatric/Neurological:  No Symptoms Reported


Hematologic/Lymphatic:  No Symptoms Reported


Immunological/Allergic:  no symptoms reported





Past Medical-Social-Family Hx


Patient Social History


Alcohol Use:  Denies Use


Recreational Drug Use:  No


Smoking Status:  Former Smoker


Type Used:  Cigarettes


Former Smoker, Quit:  Oct 5, 2017


2nd Hand Smoke Exposure:  Yes


Recent Foreign Travel:  No


Contact w/Someone Who Travel:  No


Recent Infectious Disease Expo:  No


Recent Hopitalizations:  Yes


Physical Abuse:  No


Sexual Abuse:  No


Mistreated:  No


Fear:  No





Immunizations Up To Date


Tetanus Booster (TDap):  Unknown


PED Vaccines UTD:  Yes


Date of Pneumonia Vaccine:  2016


Date of Influenza Vaccine:  Sep 1, 2011





Seasonal Allergies


Seasonal Allergies:  No





Surgeries


History of Surgeries:  Yes (LEFT HIP,HYST.,THYROIDECTOMY 95%, cataract)


Surgeries:  Hysterectomy, Lumpectomy, Orthopedic, Renal (nephrectomy for 

treatment of renal cancer)





Respiratory


History of Respiratory Disorde:  Yes (COPD)


Respiratory Disorders:  Pneumonia, COPD, Emphysema


Currently Using CPAP:  No


Currently Using BIPAP:  No





Cardiovascular


History of Cardiac Disorders:  Yes


Cardiac Disorders:  Atrial Fibrillation, Hypertension





Neurological


History of Neurological Disord:  No





Reproductive System


Pregnant:  No


Hx Reproductive Disorders:  No


Sexually Transmitted Disease:  No


HIV/AIDS:  No


Female Reproductive Disorders:  Denies





Genitourinary


History of Genitourinary Disor:  Yes (KIDNEY CANCER AND REMOVAL)





Gastrointestinal


History of Gastrointestinal Di:  No





Musculoskeletal


History of Musculoskeletal Dis:  Yes


Musculoskeletal Disorders:  Osteoporosis





Endocrine


History of Endocrine Disorders:  Yes


Endocrine Disorders:  Hypothyroidsim





HEENT


History of HEENT Disorders:  Yes


HEENT Disorders:  Cataract


Loss of Vision:  Denies


Hearing Impairment:  Hard of Hearing





Cancer


History of Cancer:  Yes


Cancer:  Kidney


Did You Recieve Any Treatments:  Yes


Type of Tx Receive:  Surgical Intervention





Psychosocial


History of Psychiatric Problem:  No


Suicide Risk Score:  0





Integumentary


History of Skin or Integumenta:  Yes (bruises easily)





Blood Transfusions


History of Blood Disorders:  No


Adverse Reaction to a Blood Tr:  No





Family Medical History


Family Medial History:  


Alcoholism


  19 FATHER


Arthritis


  19 MOTHER


Diabetes mellitus


  19 MOTHER


Hypertension


  19 MOTHER


Neoplasm (breast cancer)


  19 MOTHER


Osteoporosis


  19 MOTHER





Physical Exam


Vital Signs





Vital Sign - Last 12Hours








 17





 19:09


 


Temp 97.5


 


Pulse 88


 


Resp 24


 


B/P (MAP) 164/81


 


Pulse Ox 95


 


O2 Delivery Nasal Cannula


 


O2 Flow Rate 5.00





Capillary Refill : Less Than 3 Seconds


General Appearance:  No Apparent Distress, WD/WN, Thin


HEENT:  PERRL/EOMI, Normal ENT Inspection, Pharynx Normal


Neck:  Normal Inspection


Respiratory:  Lungs Clear, Normal Breath Sounds, No Accessory Muscle Use, No 

Respiratory Distress, Other (mild tachypnea)


Cardiovascular:  Regular Rate, Rhythm, No Edema, No Murmur


Gastrointestinal:  Non Tender, Soft, Tenderness (slight tenderness in the 

suprapubic region, slight tenderness in the left groin)


Extremity:  Normal Inspection, Other (mild swelling of the left leg.)


Neurologic/Psychiatric:  Alert, Oriented x3, No Motor/Sensory Deficits, Normal 

Mood/Affect, CNs II-XII Norm as Tested


Skin:  Normal Color, Warm/Dry





Progress/Results/Core Measures


Results/Orders


Lab Results





Laboratory Tests








Test


  17


19:32 17


21:00 Range/Units


 


 


White Blood Count


  10.0 


  


  4.3-11.0


10^3/uL


 


Red Blood Count


  3.89 L


  


  4.35-5.85


10^6/uL


 


Hemoglobin 10.5 L  11.5-16.0  G/DL


 


Hematocrit 35   35-52  %


 


Mean Corpuscular Volume 89   80-99  FL


 


Mean Corpuscular Hemoglobin 27   25-34  PG


 


Mean Corpuscular Hemoglobin


Concent 30 L


  


  32-36  G/DL


 


 


Red Cell Distribution Width 14.8 H  10.0-14.5  %


 


Platelet Count


  246 


  


  130-400


10^3/uL


 


Mean Platelet Volume 10.8 H  7.4-10.4  FL


 


Neutrophils (%) (Auto) 77 H  42-75  %


 


Lymphocytes (%) (Auto) 13   12-44  %


 


Monocytes (%) (Auto) 9   0-12  %


 


Eosinophils (%) (Auto) 1   0-10  %


 


Basophils (%) (Auto) 0   0-10  %


 


Neutrophils # (Auto) 7.7   1.8-7.8  X 10^3


 


Lymphocytes # (Auto) 1.3   1.0-4.0  X 10^3


 


Monocytes # (Auto) 0.9   0.0-1.0  X 10^3


 


Eosinophils # (Auto)


  0.1 


  


  0.0-0.3


10^3/uL


 


Basophils # (Auto)


  0.0 


  


  0.0-0.1


10^3/uL


 


Sodium Level 142   135-145  MMOL/L


 


Potassium Level 4.1   3.6-5.0  MMOL/L


 


Chloride Level 105     MMOL/L


 


Carbon Dioxide Level 27   21-32  MMOL/L


 


Anion Gap 10   5-14  MMOL/L


 


Blood Urea Nitrogen 20 H  7-18  MG/DL


 


Creatinine


  1.14 


  


  0.60-1.30


MG/DL


 


Estimat Glomerular Filtration


Rate 45 


  


   


 


 


BUN/Creatinine Ratio 18    


 


Glucose Level 127 H    MG/DL


 


Calcium Level 9.2   8.5-10.1  MG/DL


 


Total Bilirubin 0.3   0.1-1.0  MG/DL


 


Aspartate Amino Transf


(AST/SGOT) 12 


  


  5-34  U/L


 


 


Alanine Aminotransferase


(ALT/SGPT) 7 


  


  0-55  U/L


 


 


Alkaline Phosphatase 90     U/L


 


Troponin I < 0.30   <0.30  NG/ML


 


C-Reactive Protein High


Sensitivity 10.51 H


  


  0.00-0.50


MG/DL


 


Total Protein 6.6   6.4-8.2  GM/DL


 


Albumin 3.6   3.2-4.5  GM/DL


 


Urine Color  YELLOW   


 


Urine Clarity  CLEAR   


 


Urine pH  5  5-9  


 


Urine Specific Gravity  1.015 L 1.016-1.022  


 


Urine Protein  2+ H NEGATIVE  


 


Urine Glucose (UA)  NEGATIVE  NEGATIVE  


 


Urine Ketones  NEGATIVE  NEGATIVE  


 


Urine Nitrite  NEGATIVE  NEGATIVE  


 


Urine Bilirubin  NEGATIVE  NEGATIVE  


 


Urine Urobilinogen  NORMAL  NORMAL  MG/DL


 


Urine Leukocyte Esterase  1+ H NEGATIVE  


 


Urine RBC (Auto)  1+ H NEGATIVE  


 


Urine RBC  RARE   /HPF


 


Urine WBC  0-2   /HPF


 


Urine Squamous Epithelial


Cells 


  2-5 


   /HPF


 


 


Urine Crystals  NONE   /LPF


 


Urine Bacteria  TRACE   /HPF


 


Urine Casts  NONE   /LPF


 


Urine Mucus  NEGATIVE   /LPF


 


Urine Culture Indicated  NO   








My Orders





Orders - MIKE MIRELES MD


Cbc With Automated Diff (17 19:18)


Comprehensive Metabolic Panel (17 19:18)


Hs C Reactive Protein (17 19:18)


Troponin I (17 19:18)


Ua Culture If Indicated (17 19:18)


Saline Lock/Iv-Start (17 19:18)


Ekg Tracing (17 19:18)


O2 (17 19:18)


Monitor-Rhythm Ecg Trace Only (17 19:18)


Chest 1 View, Ap/Pa Only (17 19:18)


Tramadol Tablet (Ultram Tablet) (17 21:00)


Pelvis (17 21:29)


Femur, Left, 2 Views (17 )





Medications Given in ED





Current Medications








 Medications  Dose


 Ordered  Sig/[place]


 Route  Start Time


 Stop Time Status Last Admin


Dose Admin


 


 Tramadol HCl  25 mg  ONCE  ONCE


 PO  17 21:00


 17 21:01 DC 17 21:31


25 MG








Vital Signs/I&O





Vital Sign - Last 12Hours








 17





 19:09 19:09 22:17 22:30


 


Temp  97.5  


 


Pulse  88 87 


 


Resp  24 24 


 


B/P (MAP)  164/81  


 


Pulse Ox 95 95 95 97


 


O2 Delivery Nasal Cannula Nasal Cannula Room Air Nasal Cannula


 


O2 Flow Rate 5.00 5.00 5.00 5.00


 


    





 17





 22:30 23:00 23:47 00:00


 


Temp 97.7   98.4


 


Pulse 86  88 85


 


Resp 18   16


 


B/P (MAP) 178/74   176/74


 


Pulse Ox 96  95 97


 


O2 Delivery Nasal Cannula Nasal Cannula  Nasal Cannula


 


O2 Flow Rate 5.00 5.00  5.00


 


    





 17 





 02:00 02:36 04:00 


 


Temp   99.2 


 


Pulse 85  81 


 


Resp   18 


 


B/P (MAP) 172/75  160/71 


 


Pulse Ox  94 95 


 


O2 Delivery  Nasal Cannula Nasal Cannula 


 


O2 Flow Rate  4.00 5.00 














Blood Pressure Mean:  108








Progress Note :  


Progress Note


There is suspicion for pneumonia based on chest x-ray.  X-rays of the pelvis 

and left femur were also obtained due to complaint of groin pain.  No acute 

fractures were appreciated by this provider.  Patient was given tramadol 25 mg 

for treatment of pain.  Patient's daughter felt very strongly that the patient 

should be admitted for observation.  I discussed the case with Dr. Castañeda who is 

agreeable.  Patient will be continued on her Levaquin at a decreased dose that 

is more appropriate for her weight and renal function.  She will be allowed to 

use her home medication supply.





Diagnostic Imaging





   Diagonstic Imaging:  Xray


   Plain Films/CT/US/NM/MRI:  chest


Comments


Chest x-ray viewed by me and report reviewed.  See report below:





NAME:   ELENI SEQUEIRA


MED REC#:   [id number]


ACCOUNT#:   [id number]


PT STATUS:   REG ER


:   1931


PHYSICIAN:   MIKE MIRELES MD


ADMIT DATE:   17/ER


   ***Draft***


Date of Exam:17





CHEST 1 VIEW, AP/PA ONLY





INDICATION: Right-sided back pain, lower extremity swelling.





TECHNIQUE: Single view chest, 7:46 p.m.





CORRELATION STUDY: 2017.





FINDINGS: Heart size and mediastinum are generally stable. New


opacity over the right heart border and lung base suspect for


area of infiltrate. Probable trace pleural effusion. Left lung


has mildly prominent interstitial markings at its base, increased


as well. Findings appear to be superimposed on chronic change of


lung parenchyma. Scoliotic curvature of the thoracic spine.





IMPRESSION: Chronic change of the lung parenchyma with what


appears to be superimposed infiltrate and likely effusion at the


right lung base.  





  Dictated on workstation # [id number]





Dict:   17


Trans:   17


E [id number]





Interpreted by:     NORMA LOWRY DO








   Diagonstic Imaging:  Xray


   Plain Films/CT/US/NM/MRI:  pelvis, hip


Comments


x-rays of the pelvis and left femur were viewed by me.  Report not yet 

available.  No acute abnormalities were appreciated.





Departure


Communication (Admissions)


Time/Spoke to Admitting Phy:  21:35


Communication


Dr. Castaeñda





Impression


Impression:  


 Primary Impression:  


 Right lower lobe pneumonia


 Qualified Codes:  J18.1 - Lobar pneumonia, unspecified organism


 Additional Impressions:  


 Posterior chest pain


 Left hip pain


Disposition:   ADMITTED AS INPATIENT


Condition:  Stable





Admissions


Decision to Admit Reason:  Admit from ER (General)


Decision to Admit/Date:  2017


Time/Decision to Admit Time:  21:35





Departure-Patient Inst.


Referrals:  


DASHAWN YOUNG DO (PCP)


Primary Care Physician








TIO TORIBIO DO (Family)


Primary Care Physician











MIKE MIRELES MD 2017 21:49

## 2017-11-12 NOTE — DIAGNOSTIC IMAGING REPORT
INDICATION: Right-sided back pain, lower extremity swelling.



TECHNIQUE: Single view chest, 7:46 p.m.



CORRELATION STUDY: 11/1/2017.



FINDINGS: Heart size and mediastinum are generally stable. New

opacity over the right heart border and lung base suspect for

area of infiltrate. Probable trace pleural effusion. Left lung

has mildly prominent interstitial markings at its base, increased

as well. Findings appear to be superimposed on chronic change of

lung parenchyma. Scoliotic curvature of the thoracic spine.



IMPRESSION: Chronic change of the lung parenchyma with what

appears to be superimposed infiltrate and likely effusion at the

right lung base.  



Dictated by: 



  Dictated on workstation # [id number]

## 2017-11-12 NOTE — XMS REPORT
Clinical Summary

 Created on: 2017



Jigna Herring

External Reference #: [id number]

: 1931

Sex: Female



Demographics







 Address  [address] [place]  [address]

 

 Home Phone  [phone]

 

 Preferred Language  English

 

 Marital Status  Unknown

 

 [employer] Affiliation  [facility]

 

 Race  White

 

 Ethnic Group  Not  or 





Author







 Author  [facility]

 

 Organization  [facility]

 

 Address  Unknown

 

 Phone  Unavailable







Support







 Name  Relationship  Address  Phone

 

 , Jessie Guerra  [place]  Unknown  [phone]

 

 , Jenny Guerra  [employer]  Unknown  [phone]







Care Team Providers







 Care Team Member Name  Role  Phone

 

  PCP  Unavailable







Source Comments

Some departments are not documenting in the electronic medical record.  If you 
do not see the information that you expected, contact Release of Information in 
the Health Information Management department at [phone] for further 
assistance in locating additional records.[facility]



Allergies







    



  Active Allergy   Reactions   Severity   Noted Date   Comments

 

    



  Seasonal Allergies   RHINITIS   Low   10/13/2016 

 

    



  Sulfa (Sulfonamide   UNKNOWN   Low   10/13/2016 



  Antibiotics)    







Current Medications







      



  Prescription   Sig.   Disp.   Refills   Start   End Date   Status



      Date  

 

      



  levothyroxine (SYNTHROID)   Take 88 mcg by mouth       Active



  88 mcg tablet   daily 30 minutes before     



   breakfast.     

 

      



  omeprazole DR(+)   Take 40 mg by mouth daily       Active



  (PRILOSEC) 40 mg capsule   before breakfast.     

 

      



  amLODIPine (NORVASC) 5 mg   Take 5 mg by mouth every       Active



  tablet   morning.     

 

      



  ALPRAZolam (XANAX) 0.25   Take 0.25 mg by mouth at       Active



  mg tablet   bedtime daily.     

 

      



  cholecalciferol (VITAMIN   Take 1,000 Units by mouth       Active



  D-3) 1,000 units tablet   daily.     

 

      



  cyanocobalamin (VITAMIN   Inject 1 mL into the       Active



  B-12, RUBRAMIN) 1,000   muscle every 30 days.     



  mcg/mL injection      

 

      



  fish oil /omega-3 fatty   Take 1 Cap by mouth       Active



  acids (SEA-OMEGA)   daily.     



  340/1000 mg capsule      

 

      



  atenolol (TENORMIN) 25 mg   Take 25 mg by mouth every       Active



  tablet   morning.     

 

      



  guaiFENesin LA (MUCINEX)   Take 600 mg by mouth       Active



  600 mg tablet   daily.     

 

      



  ibuprofen (ADVIL) 200 mg   Take 200 mg by mouth       Active



  tablet   every 6 hours as needed     



   for Pain. Take with food.     

 

      



  fluticasone (FLONASE) 50   Apply 1 Spray to each       Active



  mcg/actuation nasal spray   nostril as directed daily     



   as needed. Shake bottle     



   gently before using.     

 

      



  albuterol 0.5%   Inhale 2.5 mg solution by       Active



  (PROVENTIL; VENTOLIN) 2.5   nebulizer as directed     



  mg/0.5 mL nebulizer   every 6 hours as needed     



  solution   for Shortness of Breath     



   or Wheezing.     

 

      



  budesonide/formoterol   Inhale 1-2 Puffs by mouth       Active



  (SYMBICORT HFA) 160/4.5   into the lungs twice     



  mcg inhalation   daily.     

 

      



  meloxicam (MOBIC) 7.5 mg   Take 7.5 mg by mouth       Active



  tablet   daily as needed for Pain.     

 

      



  traMADol (ULTRAM) 50 mg   Take 1 Tab by mouth every   30 Tab   0   20  
  Active



  tablet   6 hours as needed for     16  



   Pain.     

 

      



  hyoscyamine (ANASPAZ;   Place 1 Tab under tongue   30 Tab   3   20    
Active



  NULEV; SYMAX FASTABS;   every 4 hours as needed.     16  



  HYOMAX-FT; ED-SPAZ;      



  OSCIMIN) 0.125 mg rapid      



  dissolve tablet      







Active Problems







 



  Problem   Noted Date

 

 



  Panlobular emphysema (HCC)   2016

 

 



  Transitional cell carcinoma of kidney (HCC)   2016

 

 



  Urothelial cancer ([facility])   10/13/2016

 

 



  Overview:



  *Patient was taken for cystoscopy and biopsy 16, with pathology



  revealed:



  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



  - No invasion of the observed subpithelial connective tissue is detected



  *Cytology at the time of biopsy also positive for urothelial cell



  carcinoma.





  16 OPERATIVE PROCEDURE: Right robotic-assisted laparoscopic



  nephroureterectomy with bladder cuff





  Pathology:



  A. Kidney and ureter, "right kidney and ureter", nephroureterectomy:



  Kidney: Noninvasive low grade papillary urothelial carcinoma, Greatest



  dimension: 2.8 cm



  Pathologic Staging (pTNM): pTa Nx Mx





  Last Assessment & Plan:



  Needs cystoscopy in 3 months ~ pt prefers to do closer to home, she will



  call to arrange with Dr Collins



  Will need routine FU cystoscopy every 3 months





  Copy of pathology report given to pt





  Okay to return to work when she is ready, no restrictions





  Discussed increased risks of recurrence with smoking, advised smoking



  cessation







Family History







   



  Medical History   Relation   Name   Comments

 

   



  Cancer   Other  









   



  Relation   Name   Status   Comments

 

   



  Other   







Social History







    



  Tobacco Use   Types   Packs/Day   Years Used   Date

 

    



  Current Every Day Smoker   Cigarettes   1   65 

 

    



  Smokeless Tobacco: Never   



  Used   









 Comments: down to 3/4 pack/day at present









   



  Alcohol Use   Drinks/Week   oz/Week   Comments

 

   



  No   









 



  Sex Assigned at Birth   Date Recorded

 

 



  Not on file 







Last Filed Vital Signs







  



  Vital Sign   Reading   Time Taken

 

  



  Blood Pressure   155/72   2017  1:02 PM CST

 

  



  Pulse   67   2017  1:02 PM CST

 

  



  Temperature   36.7   C (98   F)   2016  8:32 AM CST

 

  



  Respiratory Rate   -   -

 

  



  Oxygen Saturation   92%   2016  9:26 AM CST

 

  



  Inhaled Oxygen   -   -



  Concentration  

 

  



  Weight   45.5 kg (100 lb 6.4 oz)   2017  1:02 PM CST

 

  



  Height   154.9 cm (5' 1")   2017  1:02 PM CST

 

  



  Body Mass Index   18.97   2017  1:02 PM CST







Plan of Treatment







   



  Health Maintenance   Due Date   Last Done   Comments

 

   



  PHYSICAL (COMPREHENSIVE)   1938  



  EXAM   

 

   



  PERTUSSIS VACCINE   1942  

 

   



  TETANUS VACCINE   1948  

 

   



  SHINGLES VACCINE   1991  

 

   



  OSTEOPOROSIS SCREENING   1996  

 

   



  PREVNAR/PNEUMOVAX (#1)   1996  

 

   



  INFLUENZA VACCINE   2017  







Results

Not on filefrom Last 3 Months

## 2017-11-12 NOTE — XMS REPORT
Clinical Summary

 Created on: 2017



Jigna Herring

External Reference #: [id number]

: 1931

Sex: Female



Demographics







 Address  [address] [place]  [address]

 

 Home Phone  [phone]

 

 Preferred Language  English

 

 Marital Status  Unknown

 

 Christianity Affiliation  MET

 

 Race  White

 

 Ethnic Group  Not  or 





Author







 Author  [facility]

 

 Organization  [facility]

 

 Address  Unknown

 

 Phone  Unavailable







Support







 Name  Relationship  Address  Phone

 

 , Jessie Guerra  ECON  Unknown  [phone]

 

 , Jenny Guerra  ECON  Unknown  [phone]







Care Team Providers







 Care Team Member Name  Role  Phone

 

  PCP  Unavailable







Source Comments

Some departments are not documenting in the electronic medical record.  If you 
do not see the information that you expected, contact Release of Information in 
the Health Information Management department at [phone] for further 
assistance in locating additional records.[facility]



Allergies







    



  Active Allergy   Reactions   Severity   Noted Date   Comments

 

    



  Seasonal Allergies   RHINITIS   Low   10/13/2016 

 

    



  Sulfa (Sulfonamide   UNKNOWN   Low   10/13/2016 



  Antibiotics)    







Current Medications







      



  Prescription   Sig.   Disp.   Refills   Start   End Date   Status



      Date  

 

      



  levothyroxine (SYNTHROID)   Take 88 mcg by mouth       Active



  88 mcg tablet   daily 30 minutes before     



   breakfast.     

 

      



  omeprazole DR(+)   Take 40 mg by mouth daily       Active



  (PRILOSEC) 40 mg capsule   before breakfast.     

 

      



  amLODIPine (NORVASC) 5 mg   Take 5 mg by mouth every       Active



  tablet   morning.     

 

      



  ALPRAZolam (XANAX) 0.25   Take 0.25 mg by mouth at       Active



  mg tablet   bedtime daily.     

 

      



  cholecalciferol (VITAMIN   Take 1,000 Units by mouth       Active



  D-3) 1,000 units tablet   daily.     

 

      



  cyanocobalamin (VITAMIN   Inject 1 mL into the       Active



  B-12, RUBRAMIN) 1,000   muscle every 30 days.     



  mcg/mL injection      

 

      



  fish oil /omega-3 fatty   Take 1 Cap by mouth       Active



  acids (SEA-OMEGA)   daily.     



  340/1000 mg capsule      

 

      



  atenolol (TENORMIN) 25 mg   Take 25 mg by mouth every       Active



  tablet   morning.     

 

      



  guaiFENesin LA (MUCINEX)   Take 600 mg by mouth       Active



  600 mg tablet   daily.     

 

      



  ibuprofen (ADVIL) 200 mg   Take 200 mg by mouth       Active



  tablet   every 6 hours as needed     



   for Pain. Take with food.     

 

      



  fluticasone (FLONASE) 50   Apply 1 Spray to each       Active



  mcg/actuation nasal spray   nostril as directed daily     



   as needed. Shake bottle     



   gently before using.     

 

      



  albuterol 0.5%   Inhale 2.5 mg solution by       Active



  (PROVENTIL; VENTOLIN) 2.5   nebulizer as directed     



  mg/0.5 mL nebulizer   every 6 hours as needed     



  solution   for Shortness of Breath     



   or Wheezing.     

 

      



  budesonide/formoterol   Inhale 1-2 Puffs by mouth       Active



  (SYMBICORT HFA) 160/4.5   into the lungs twice     



  mcg inhalation   daily.     

 

      



  meloxicam (MOBIC) 7.5 mg   Take 7.5 mg by mouth       Active



  tablet   daily as needed for Pain.     

 

      



  traMADol (ULTRAM) 50 mg   Take 1 Tab by mouth every   30 Tab   0   20  
  Active



  tablet   6 hours as needed for     16  



   Pain.     

 

      



  hyoscyamine (ANASPAZ;   Place 1 Tab under tongue   30 Tab   3   20    
Active



  NULEV; SYMAX FASTABS;   every 4 hours as needed.     16  



  HYOMAX-FT; ED-SPAZ;      



  OSCIMIN) 0.125 mg rapid      



  dissolve tablet      







Active Problems







 



  Problem   Noted Date

 

 



  Panlobular emphysema (HCC)   2016

 

 



  Transitional cell carcinoma of kidney (HCC)   2016

 

 



  Urothelial cancer ([facility])   10/13/2016

 

 



  Overview:



  *Patient was taken for cystoscopy and biopsy 16, with pathology



  revealed:



  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



  - No invasion of the observed subpithelial connective tissue is detected



  *Cytology at the time of biopsy also positive for urothelial cell



  carcinoma.





  16 OPERATIVE PROCEDURE: Right robotic-assisted laparoscopic



  nephroureterectomy with bladder cuff





  Pathology:



  A. Kidney and ureter, "right kidney and ureter", nephroureterectomy:



  Kidney: Noninvasive low grade papillary urothelial carcinoma, Greatest



  dimension: 2.8 cm



  Pathologic Staging (pTNM): pTa Nx Mx





  Last Assessment & Plan:



  Needs cystoscopy in 3 months ~ pt prefers to do closer to home, she will



  call to arrange with Dr Collins



  Will need routine FU cystoscopy every 3 months





  Copy of pathology report given to pt





  Okay to return to work when she is ready, no restrictions





  Discussed increased risks of recurrence with smoking, advised smoking



  cessation







Family History







   



  Medical History   Relation   Name   Comments

 

   



  Cancer   Other  









   



  Relation   Name   Status   Comments

 

   



  Other   







Social History







    



  Tobacco Use   Types   Packs/Day   Years Used   Date

 

    



  Current Every Day Smoker   Cigarettes   1   65 

 

    



  Smokeless Tobacco: Never   



  Used   









 Comments: down to 3/4 pack/day at present









   



  Alcohol Use   Drinks/Week   oz/Week   Comments

 

   



  No   









 



  Sex Assigned at Birth   Date Recorded

 

 



  Not on file 







Last Filed Vital Signs







  



  Vital Sign   Reading   Time Taken

 

  



  Blood Pressure   155/72   2017  1:02 PM CST

 

  



  Pulse   67   2017  1:02 PM CST

 

  



  Temperature   36.7   C (98   F)   2016  8:32 AM CST

 

  



  Respiratory Rate   -   -

 

  



  Oxygen Saturation   92%   2016  9:26 AM CST

 

  



  Inhaled Oxygen   -   -



  Concentration  

 

  



  Weight   45.5 kg (100 lb 6.4 oz)   2017  1:02 PM CST

 

  



  Height   154.9 cm (5' 1")   2017  1:02 PM CST

 

  



  Body Mass Index   18.97   2017  1:02 PM CST







Plan of Treatment







   



  Health Maintenance   Due Date   Last Done   Comments

 

   



  PHYSICAL (COMPREHENSIVE)   1938  



  EXAM   

 

   



  PERTUSSIS VACCINE   1942  

 

   



  TETANUS VACCINE   1948  

 

   



  SHINGLES VACCINE   1991  

 

   



  OSTEOPOROSIS SCREENING   1996  

 

   



  PREVNAR/PNEUMOVAX (#1)   1996  

 

   



  INFLUENZA VACCINE   2017  







Results

Not on filefrom Last 3 Months

## 2017-11-13 VITALS — SYSTOLIC BLOOD PRESSURE: 148 MMHG | DIASTOLIC BLOOD PRESSURE: 64 MMHG

## 2017-11-13 VITALS — SYSTOLIC BLOOD PRESSURE: 143 MMHG | DIASTOLIC BLOOD PRESSURE: 64 MMHG

## 2017-11-13 VITALS — DIASTOLIC BLOOD PRESSURE: 84 MMHG | SYSTOLIC BLOOD PRESSURE: 188 MMHG

## 2017-11-13 VITALS — SYSTOLIC BLOOD PRESSURE: 172 MMHG | DIASTOLIC BLOOD PRESSURE: 75 MMHG

## 2017-11-13 VITALS — SYSTOLIC BLOOD PRESSURE: 158 MMHG | DIASTOLIC BLOOD PRESSURE: 88 MMHG

## 2017-11-13 VITALS — DIASTOLIC BLOOD PRESSURE: 84 MMHG | SYSTOLIC BLOOD PRESSURE: 193 MMHG

## 2017-11-13 VITALS — DIASTOLIC BLOOD PRESSURE: 74 MMHG | SYSTOLIC BLOOD PRESSURE: 176 MMHG

## 2017-11-13 VITALS — DIASTOLIC BLOOD PRESSURE: 71 MMHG | SYSTOLIC BLOOD PRESSURE: 160 MMHG

## 2017-11-13 RX ADMIN — NITROGLYCERIN PRN INCH: 20 OINTMENT TOPICAL at 06:42

## 2017-11-13 RX ADMIN — LEVOFLOXACIN SCH MG: 250 TABLET, FILM COATED ORAL at 00:45

## 2017-11-13 RX ADMIN — RIVAROXABAN SCH MG: 15 TABLET, FILM COATED ORAL at 18:59

## 2017-11-13 RX ADMIN — ALPRAZOLAM SCH MG: 0.25 TABLET ORAL at 19:06

## 2017-11-13 RX ADMIN — IPRATROPIUM BROMIDE AND ALBUTEROL SULFATE SCH ML: .5; 3 SOLUTION RESPIRATORY (INHALATION) at 09:22

## 2017-11-13 RX ADMIN — IPRATROPIUM BROMIDE AND ALBUTEROL SULFATE SCH ML: .5; 3 SOLUTION RESPIRATORY (INHALATION) at 02:37

## 2017-11-13 RX ADMIN — IPRATROPIUM BROMIDE AND ALBUTEROL SULFATE SCH ML: .5; 3 SOLUTION RESPIRATORY (INHALATION) at 14:25

## 2017-11-13 RX ADMIN — LEVOFLOXACIN SCH MG: 250 TABLET, FILM COATED ORAL at 19:05

## 2017-11-13 RX ADMIN — IPRATROPIUM BROMIDE AND ALBUTEROL SULFATE SCH ML: .5; 3 SOLUTION RESPIRATORY (INHALATION) at 22:12

## 2017-11-13 RX ADMIN — NITROGLYCERIN PRN INCH: 20 OINTMENT TOPICAL at 00:55

## 2017-11-13 RX ADMIN — NICOTINE SCH MG: 7 PATCH, EXTENDED RELEASE TRANSDERMAL at 22:47

## 2017-11-13 RX ADMIN — IPRATROPIUM BROMIDE AND ALBUTEROL SULFATE SCH ML: .5; 3 SOLUTION RESPIRATORY (INHALATION) at 18:32

## 2017-11-13 RX ADMIN — IPRATROPIUM BROMIDE AND ALBUTEROL SULFATE SCH ML: .5; 3 SOLUTION RESPIRATORY (INHALATION) at 06:30

## 2017-11-13 NOTE — DIAGNOSTIC IMAGING REPORT
INDICATION: Shortness of breath and chest pain.



Bilateral lower extremity venous Doppler study was performed in

the routine fashion with color flow Doppler and waveform

analysis.



FINDINGS: The common femoral veins, superficial femoral veins,

popliteal veins and visualized portion of the tibial veins show

normal compressibility and venous flow patterns. There is normal

augmentation. 



IMPRESSION: No evidence of deep vein thrombosis in the major

veins of both legs.



Dictated by: 



  Dictated on workstation # [id number]

## 2017-11-13 NOTE — OCCUPATIONAL THERAPY EVAL
OT Evaluation-General/PLF


Medical Diagnosis


Admission Date


Nov 12, 2017 at 21:43


Medical Diagnosis:  Pneumonia, chest and hip pain


Onset Date:  Nov 12, 2017





Therapy Diagnosis


Therapy Diagnosis:  decr self care, weakness





Height/Weight


Height (Feet):  5


Height (Inches):  0.00


Weight (Pounds):  100


Weight (Ounces):  0.0





Precautions


Precautions/Isolations:  Fall Prevention, Standard Precautions





Weight Bear Status


Weight Bearing Restriction:  Touch Toe Bearing


Location Restriction:  L LE





Referral


Physician:  Lillian


Referral Reason:  Evaluation/Treatment





Medical History


Pertinent Medical History:  Atrial Fib, COPD, HTN, Hypothroidism, Renal 

Insufficiency, Smoking


Additional Medical History


L hip fx, with DC from [facility] on 11-3-17. On discharge was mod I with eating, 

grooming and toilet transfers and setup/SBA with other ADLs. Nephrectomy for 

kidney cancer. Emphysema. Osteoporosis, chronic back pain. Hard of hearing, 

cataracts


Current History


Pt was recently discharged form IRF and had been home alone for about a week. 

Admitted with chest pain and hip/groin pain, as well as pneumonia.


Reviewed History:  Yes





Social History


Home:  Single Level


Current Living Status:  Alone


Entry Into Home:  Stairs With Railing


 Steps Into Home:  2





ADL-Prior Level of Function


ADL PLOF Comments


Pt reported that she has been able to care for herself in the past. On 

discharge from IRF, she was mod I with eating, grooming and toilet transfers 

but setup/SBA for other ADLs. Her daughter stayed with her for a couple days 

but she was living alone at the time of illness. Family helped with laundry, 

meals, cleaning for the short term. She works at [employer] and still drives.


DME/Equipment:  Bedside Commode, Grab Bars, Shower, Tub/Shower


Drive Self:  Yes





OT Current Status


Subjective


Pt seen in room, up in bed, agreeable to OT. Pt reported pain 0/10 but said 

that "My back has been killing me" and reported discomfort in groin and L leg. 

She said that she had no concerns about being alone at home except fear of 

falling.





Appearance


Alert, cooperative





Mental Status/Objective


Attachments:  Oxygen (4L)





Current


Glasses/Contacts:  Yes


Hand Dominance:  Right


Upper Extremity ROM


Grossly WFL bilat


Upper Extremity Strength


Grossly 4/5 bilat





ADL-Treatment


ADL-Current


Pt reported that she has been able to feed herself and has been going to the 

bathroom with BS with nursing assistance.


              Functional [place] Measure


0=Not Assessed/NA   4=Minimal Assistance


1=Total Assistance   5=Supervision or Setup


2=Maximal Assistance   6=Modified [place]


3=Moderate Assistance   7=Complete IndependenceIRFPAI Quality Coding Scale











6 Independent with activity with or without an assistive device


 


5  Patient requires set up or clean up by helper.  Patient completes activity  

by  themselves


 


4 Supervision or touching assist (CGA). [place] provide cues , steadying assist


 


3 The helper provides less than half the effort to complete the activity


 


2 The helper provides more than half the effort to complete the activity


 


1 Dependent.  The helper does all the effort to complete an activity 


 


7 Patient refused to complete or attempt activity


 


9 The patient did not perform the activity before the current illness or injury


 


88 Not attempted due to Medical conditions or safety concerns








Per physical therapy, she needed SBA for transfers, FWW and walked 300" with SBA

, FWW.





Education


OT Patient Education:  Purpose of tx/functional activities, Rehab process


Teaching Recipient:  Patient


Teaching Methods:  Discussion


Response to Teaching:  Verbalize Understanding





OT Long Term Goals


Long Term Goals


Time Frame:  Nov 20, 2017


Eating (FIM):  6


Grooming(FIM):  6


Bathing(FIM):  5


Upper Body Dressing(FIM):  6


Lower Body Dressing(FIM):  6


Toileting(FIM):  6


Toilet/Commode Transfer(FIM):  6


Shower Transfer(FIM):  5


Additional Goals:  2-Verbalize Understanding


1=Demonstrate adherence to instructed precautions during ADL tasks.


2=Patient will verbalize/demonstrate understanding of assistive devices/

modifications for ADL.


3=Patient will improve strength/tolerance for activity to enable patient to 

perform ADL's.





OT Education/Plan


Problem List/Assessment


Assessment:  Decreased Activ Tolerance, Decreased UE Strength, Impaired Self-

Care Skills


Pt would benefit from skilled OT to increase her independence in basic self 

care to allow her to safely return to her home and to decrease caregiver burden.





Discharge Recommendations


Plan/Recommendations:  Continue POC


Therapy D/C Recommendations:  Occupational Therapy Home Care


Target Placement


home with home health





Treatment Plan/Plan of Care


Treatment,Training & Education:  Yes


Patient would benefit from OT for education, treatment and training to promote 

independence in ADL's, mobility, safety and/or upper extremity function for ADL'

s.


Plan of Care:  ADL Retraining, Functional Mobility, UE Funct Exercise/Act


Treatment Duration:  Nov 20, 2017


Frequency:  5 times per week


Estimated Hrs Per Day:  .5 hour per day


Agreement:  Yes


Rehab Potential:  Good





Time/GCodes


Start Time:  11:35


Stop Time:  11:50


Total Time Billed (hr/min):  15


Billed Treatment Time


visit, 15 minutes evaluation low intensity





PT/OT Therapy GCodes


Therapy


Functional Limitation:  Occupational Therapy


Test(s)/Tool used to determine:  FIM





Functional Limitation-Current


Charge Code:  SELFCUR


Modifier:  CJ





Functional Limitation-Goal


Charge Code:  SELFGOAL


Modifier:  MARK ANTHONY LANDA OT Nov 13, 2017 13:07

## 2017-11-13 NOTE — PHYSICAL THERAPY DAILY NOTE
PT Daily Note-Current


Subjective


Patient reports that she is doing well this p.m. She states she has a recent 

onset of back pain in thoracic region on the right. Her arms also get sore from 

WB in the FWW. She agrees to PT.





Pain





   Numeric Pain Scale:  5-Moderate Pain


   Location:  Left


   Location Body Site:  Hip


   Pain Description:  Ache





Appearance


Patient appears healthy. She is left post tx in her chair with call light and 

phone in reach.





Mental Status


Patient Orientation:  Normal For Age





Transfers


              Functional [place] Measure


0=Not Assessed/NA   4=Minimal Assistance


1=Total Assistance   5=Supervision or Setup


2=Maximal Assistance   6=Modified [place]


3=Moderate Assistance   7=Complete IndependenceIRFPAI Quality Coding Scale











6 Independent with activity with or without an assistive device


 


5  Patient requires set up or clean up by helper.  Patient completes activity  

by  themselves


 


4 Supervision or touching assist (CGA). [place] provide cues , steadying assist


 


3 The helper provides less than half the effort to complete the activity


 


2 The helper provides more than half the effort to complete the activity


 


1 Dependent.  The helper does all the effort to complete an activity 


 


7 Patient refused to complete or attempt activity


 


9 The patient did not perform the activity before the current illness or injury


 


88 Not attempted due to Medical conditions or safety concerns








Transfers (B, C, W/C) (FIM):  5


Scootin


Rollin


Supine to/from Sit:  5


Sit to/from Stand:  5


Patient is SBA with al observed transfers.





Weight Bearing


Right Lower Extremity:  Right


Full Weight Bearing


Left Lower Extremity:  Left


Touch Toe Bearing





Gait Training


Gait (FIM):  5


Distance:  >300'


Gait Level of Assist:  5


Gait Persons Needed:  1


Gait Assistive Device:  FWW


Patient is mindful of TTWB status when ambulating with FWW>





Exercises


Seated Therapy Exercises:  Long arc quads (L LE), Hip flexion (L LE)


Seated Reps:  20





Assessment


Current Status:  Good Progress


Patient has good tolerance for therapeutic exercise and gait. PT will progress 

interventions with TTWB status in mind.





PT Long Term Goals


Long Term Goals


PT Long Term Goals Time Frame:  2017


Transfers (B,C,W/C) (FIM):  6


Gait (FIM):  6


Distance:  500'


Gait Level of Assist:  6


Gait Assistive Device:  FWW





PT Plan


Problem List


Problem List:  Activity Tolerance, Functional Strength, Safety, Balance, Gait





Treatment/Plan


Treatment Plan:  Continue Plan of Care


Treatment Plan:  Functional Activity Jennifer, Functional Strength, Gait, 

Therapeutic Exercise


Treatment Duration:  2017


Frequency:  11 times per week


Estimated Hrs Per Day:  .5 hour per day


Patient and/or Family Agrees t:  Yes





Time/GCodes


Time In:  1235


Time Out:  1259


Total Billed Treatment Time:  24


Total Billed Treatment


1 visit


EX 10 min


GT 14 min





PT/OT Therapy GCodes


Functional Limitation-Current


Charge Code:  EMERALD


Modifier:  CHRISTINA





Functional Limitation-Goal


Charge Code:  CELINE


Modifier:  MELIDA PERLA PT 2017 13:55

## 2017-11-13 NOTE — PULMONARY CONSULTATION
History of Present Illness


History of Present Illness


Date of Consultation


17


 07:26


Time Seen by Provider:  07:26


Date of Admission





History of Present Illness


87yo with hx of severe COPD and recent hospitalization for left hip fracture s/

p fall and repair presents to ED secondary to worsening pleuritic right lower 

chest pain. Onset was 3-4 days ago. PT was started on Levaquin 5 days ago for 

possible pneumonia. 














Allergies and Home Medications


Allergies


Coded Allergies:  


     No Known Drug Allergies (Verified , 10/6/14)





Home Medications


Acetaminophen 325 Mg Tablet, 650 MG PO Q4H PRN for PAIN-MILD for 30 Days, #100


   Prescribed by: LISA MARIA on 10/27/17 0936


Albuterol Sulfate 2.5 Mg/3 Ml Vial.neb, 2.5 MG NEB Q4H PRN for SHORTNESS OF 

BREATH, (Reported)


Alprazolam 0.25 Mg Tablet, 0.25 MG PO HS for 30 Days, #30


   Prescribed by: LISA MARIA on 10/27/17 0936


Apixaban 2.5 Mg Tablet, 2.5 MG PO BID for 30 Days, #60


   Prescribed by: LISA MARIA on 10/27/17 0936


Diltiazem HCl 180 Mg Cap.er.24h, 180 MG PO DAILY for 30 Days, #30


   Prescribed by: LISA MARIA on 10/27/17 0936


Fluticasone/Vilanterol 1 Each Blst.w.dev, 1 PUFF IH DAILY PRN for SHORTNESS OF 

BREATH, (Reported)


Levocetirizine Dihydrochloride 5 Mg Tablet, 5 MG PO DAILY, (Reported)


Levothyroxine Sodium 88 Mcg Tablet, 88 MCG PO DAILY, (Reported)


Loperamide HCl 2 Mg Capsule, 2 MG PO UD PRN for DIARRHEA, (Reported)


Loratadine 10 Mg Tablet, 10 MG PO DAILY for 30 Days, #30


   Prescribed by: LISA MARIA on 17


Magnesium Oxide 400 Mg Tablet, 400 MG PO DAILY, (Reported)


Montelukast Sodium 10 Mg Tablet, 10 MG PO DAILY for 30 Days, #30


   Prescribed by: LISA MARIA on 17


Nicotine 1 Each Patch.td24, 7 MG TD DAILY@1400 for 14 Days, #14


   Prescribed by: LISA MARIA on 10/27/17 0936


Omeprazole 40 Mg Capsule.dr, 40 MG PO DAILY, (Reported)


Scopolamine 1 Each Patch.td72, 1.5 MG TOP Q72H for 30 Days, #10


   Prescribed by: LISA MARIA on 10/27/17 0936


Triamcinolone Acetonide 10.8 Ml Spray, 2 SPRAYS NS DAILY, (Reported)





Past Medical-Social-Family Hx


Patient Social History


Alcohol Use:  Denies Use


Recreational Drug Use:  No


Smoking Status:  Former Smoker


Type Used:  Cigarettes


Former Smoker, Quit:  Oct 5, 2017


2nd Hand Smoke Exposure:  Yes


Recent Foreign Travel:  No


Contact w/Someone Who Travel:  No


Recent Infectious Disease Expo:  No


Recent Hopitalizations:  Yes


Physical Abuse:  No


Sexual Abuse:  No


Mistreated:  No


Fear:  No





Immunizations Up To Date


Tetanus Booster (TDap):  Unknown


PED Vaccines UTD:  Yes


Date of Pneumonia Vaccine:  2016


Date of Influenza Vaccine:  Oct 24, 2017





Seasonal Allergies


Seasonal Allergies:  No





Surgeries


History of Surgeries:  Yes (LEFT HIP,HYST.,THYROIDECTOMY 95%, cataract)


Surgeries:  Hysterectomy, Lumpectomy, Orthopedic, Renal (nephrectomy for 

treatment of renal cancer)





Respiratory


History of Respiratory Disorde:  Yes (COPD)


Respiratory Disorders:  Pneumonia, COPD, Emphysema


Currently Using CPAP:  No


Currently Using BIPAP:  No





Cardiovascular


History of Cardiac Disorders:  Yes


Cardiac Disorders:  Atrial Fibrillation, Hypertension





Neurological


History of Neurological Disord:  No





Reproductive System


Pregnant:  No


Hx Reproductive Disorders:  No


Sexually Transmitted Disease:  No


HIV/AIDS:  No


Female Reproductive Disorders:  Denies





Genitourinary


History of Genitourinary Disor:  Yes (KIDNEY CANCER AND REMOVAL)





Gastrointestinal


History of Gastrointestinal Di:  No





Musculoskeletal


History of Musculoskeletal Dis:  Yes


Musculoskeletal Disorders:  Osteoporosis, Chronic Back Pain, Fractures





Endocrine


History of Endocrine Disorders:  Yes


Endocrine Disorders:  Hypothyroidsim





HEENT


History of HEENT Disorders:  Yes


HEENT Disorders:  Cataract


Loss of Vision:  Denies


Hearing Impairment:  Hard of Hearing





Cancer


History of Cancer:  Yes


Cancer:  Kidney


Did You Recieve Any Treatments:  Yes


Type of Tx Receive:  Surgical Intervention





Psychosocial


History of Psychiatric Problem:  No


Suicide Risk Score:  0





Integumentary


History of Skin or Integumenta:  Yes (bruises easily)





Blood Transfusions


History of Blood Disorders:  No


Adverse Reaction to a Blood Tr:  No





Family Medical History


Family Medial History:  


Alcoholism


  19 FATHER


Arthritis


  19 MOTHER


Diabetes mellitus


  19 MOTHER


Hypertension


  19 MOTHER


Neoplasm (breast cancer)


  19 MOTHER


Osteoporosis


  19 MOTHER





Review of Systems


Time Seen by Provider:  07:44


Constitutional:  Weakness, Malaise, No: Fever


Eyes:  No: Pain, Vision change, Conjunctivae inflammation, Eyelid inflammation, 

Other, Redness


Respiratory:  Cough, Dry, Shortness of breath, SOB with excertion


Cardiovascular:  Chest Pain, Paroxysmal Noc. Dyspnea, Edema


Neurological:  Weakness





Exam


Exam





Vital Signs








  Date Time  Temp Pulse Resp B/P (MAP) Pulse Ox O2 Delivery O2 Flow Rate FiO2


 


17 06:41  85  188/84  Nasal Cannula 5.00 


 


17 04:00 99.2 81 18 160/71 95 Nasal Cannula 5.00 


 


17 02:36     94 Nasal Cannula 4.00 


 


17 02:00  85  172/75    


 


17 00:00 98.4 85 16 176/74 97 Nasal Cannula 5.00 


 


17 23:47  88   95   


 


17 23:00      Nasal Cannula 5.00 


 


17 22:30 97.7 86 18 178/74 96 Nasal Cannula 5.00 


 


17 22:30     97 Nasal Cannula 5.00 


 


17 22:17  87 24  95 Room Air 5.00 


 


17 19:09 97.5 88 24 164/81 95 Nasal Cannula 5.00 


 


17 19:09     95 Nasal Cannula 5.00 








General Appearance:  No Apparent Distress, WD/WN, Thin


HEENT:  PERRL/EOMI, Normal ENT Inspection, Pharynx Normal


Neck:  Normal Inspection


Respiratory:  Lungs Clear, Normal Breath Sounds, No Accessory Muscle Use, No 

Respiratory Distress, Other (mild tachypnea)


Cardiovascular:  Regular Rate, Rhythm, No Edema, No Murmur


Capillary Refill:  Less Than 3 Seconds


Extremity:  Normal Inspection, Other (mild swelling of the left leg.)


Neurologic/Psychiatric:  Alert, Oriented x3, No Motor/Sensory Deficits, Normal 

Mood/Affect, CNs II-XII Norm as Tested


Skin:  Normal Color, Warm/Dry





Results


Lab


Laboratory Tests


17 19:32











Assessment/Plan


Assessment/Plan


worsening SOB RLL effusion  hx of severe COPD r/o pneumonia -- No productive 

cough, leukocytosis  or fevers 


   -Pt was on out patient levaquin - this could be a partially treated 

pneumonia 


      -Continue current Levaquin 


   -Check BNP 


   -Check bilateral dopplers


   -CT without contrast to evaluate RLL effusion - Pt has hx of nephrectomy 


   -SVNs 


Hx of diastolic heart failure 


   -Check BNP 


Recent hx of Intertrochanteric fracture of left femur- S/P repair last 

hospitalization





Atelectasis 


   -IS x 10breaths Q1hr WA


   -Increase activity


   -PT/OT 





Tobacco dependance 


   -education 


   -nicotine patch 


   


Hx of renal cell carcinoma s/p R nephrectomy 


 





CXR and labs reviewed





232





Clinical Quality Measures


DVT/VTE Risk/Contraindication:


Risk Factor Score Per Nursin


RFS Level Per Nursing on Admit:  4+=Very High











TIO TORIBIO DO 2017 07:31

## 2017-11-13 NOTE — PHYSICAL THERAPY EVALUATION
PT Evaluation-General


Medical Diagnosis


Admission Date


2017 at 21:43


Medical Diagnosis:  Pneumonia


Onset Date:  2017





Therapy Diagnosis


Therapy Diagnosis:  weakness





Height/Weight


Height (Feet):  5


Height (Inches):  0.00


Weight (Pounds):  100


Weight (Ounces):  0.0





Precautions


Precautions/Isolations:  Fall Prevention, Standard Precautions





Weight Bear Status


Right Lower Extremity:  Right


Full Weight Bearing


Left Lower Extremity:  Left


Touch Toe Bearing





Referral


Reason for Referral:  Evaluation/Treatment





Medical History


Pertinent Medical History:  Atrial Fib, COPD, HTN, Hypothroidism, Renal 

Insufficiency


Additional Medical History


hysterectomy


Current History


emphysema, pneumonia


Reviewed History:  Yes





Social History


Home:  Single Level


Current Living Status:  Alone


Entry Into Home:  Stairs With Railing


PT Steps Into Home:  2





Prior/Core FIM


Prior Level of Function


              Functional [place] Measure


0=Not Assessed/NA   4=Minimal Assistance


1=Total Assistance   5=Supervision or Setup


2=Maximal Assistance   6=Modified [place]


3=Moderate Assistance   7=Complete [place]


Bed Mobility:  7


Transfers (B,C,W/C) (FIM):  7


Gait:  6


Locomotion:  6


Patient ambulates with FWW.





PT Evaluation-Current


Subjective


Patient reports that she is doing okay this morning. She reports that she has 

not been doing anything at home other than sitting on her butt, other than when 

home health PT arrives. She states she has has left groin and foot pain since 

she was dismissed from inpatient rehab last week, but her L LE is feeling 

better today.





Pain





   Numeric Pain Scale:  5-Moderate Pain


   Location:  Left


   Location Body Site:  Hip


   Pain Description:  Ache





Pt/Family Goals


Patient wishes to return to better health and return home.





Objective


Patient Orientation:  Normal For Age


Problem Solving:  Good


Attachments:  Oxygen


4.0 L of O2





ROM/Strength


ROM Upper Extremities


WNL


ROM Lower Extremities


WNL


Strength Upper Extremities


WNL


Strength Lower Extremities


not tested due to L LE pain from recent hip fracture surgery





Integumentary/Posture


Integumentary


intact


Bowel Incontinence:  No


Bladder Incontinence:  No


Posture


slouched





Neuromuscular


(Tone, Coordination, Reflexes)


Normal tone, coordination, and reflexes





Sensory


Vision:  Wears Glasses


Hearing:  Functional


Hand Dominance:  Right


Sensation Right Upper Extremit:  Intact


Sensation Left Upper Extremity:  Intact


Sensation Right Lower Extremit:  Intact


Sensation Left Lower Extremity:  Intact





Transfers


              Functional [place] Measure


0=Not Assessed/NA   4=Minimal Assistance


1=Total Assistance   5=Supervision or Setup


2=Maximal Assistance   6=Modified [place]


3=Moderate Assistance   7=Complete [place]


Transfers (B, C, W/C) (FIM):  5


Scootin


Rollin


Supine to/from Sit:  5


Sit to/from Stand:  5


Patient performed all transfers with SBA from PT.





Gait


Mode of Locomotion:  Walk


Anticipated Mode of Locomotion:  Walk


Gait (FIM):  5


Distance:  >300'


Gait Level of Assist:  5


Gait Persons Needed:  1


Gait Assistive Device:  FWW


Comments/Gait Description


Patient walks with reciprocal gait pattern in FWW with less WB on left LE per 

physician orders.





Balance


Sitting Static:  Normal


Sitting Dynamic:  Normal


Standing Static:  Normal


 Standing Dynamic:  Normal





Assessment/Needs


Patient showed good tolerance for gait and exercise. PT will progress exercise 

per patient tolerance as their health improves.


Rehab Potential:  Good





PT Long Term Goals


Long Term Goals


PT Long Term Goals Time Frame:  2017


Transfers (B,C,W/C) (FIM):  6


Gait (FIM):  6


Distance:  500'


Gait Level of Assist:  6


Gait Assistive Device:  FWW





PT Plan


Problem List


Problem List:  Activity Tolerance, Functional Strength, Safety, Balance, Gait





Treatment/Plan


Treatment Plan:  Continue Plan of Care


Treatment Plan:  Functional Activity Jennifer, Functional Strength, Gait, 

Therapeutic Exercise


Treatment Duration:  2017


Frequency:  11 times per week


Estimated Hrs Per Day:  .5 hour per day


Patient and/or Family Agrees t:  Yes





Safety Risks/Education


Patient Education:  Gait Training, Safety Issues


Teaching Recipient:  Patient


Teaching Methods:  Demonstration, Discussion


Response to Teaching:  Verbalize Understanding, Return Demonstration





Discharge Recommendations


Therapy D/C Recommendations:  Home w/ Family Support


Equpiment Recommendations-D/C:  Front Wheeled Walker





Time/GCodes


Time In:  815


Time Out:  835


Total Billed Treatment Time:  20


Total Billed Treatment


1 visit


EVM 20 min


G Codes Necessary:  Yes





PT/OT Therapy GCodes


Functional Limitation-Current


Charge Code:  MOBCUR


Modifier:  CJ





Functional Limitation-Goal


Charge Code:  MOBGOAL


Modifier:  MELIDA PERLA PT 2017 09:11

## 2017-11-13 NOTE — DIAGNOSTIC IMAGING REPORT
INDICATION: Right-sided back pain. Lower extremity swelling.

Recent hip surgery.



COMPARISON: 11/12/2017



FINDINGS: Multiple radiographic views of the left femur were

obtained. Expected postsurgical changes of previous ORIF of the

left femur are identified. Long intramedullary maxim is noted

intersecting with a short screw which reverses the femoral head

and neck. There is near-anatomic alignment of the fracture

fragments. No unexpected radiopaque foreign bodies are seen.

Femoral acetabular joint space is maintained.



Note is made of a subtle ill-defined lucency involving the medial

acetabular wall near its junction with the superior pubic ramus.

Corresponding fracture is not identified on prior exams.



IMPRESSION:

1. Expected postsurgical changes of the left femur. No evidence

of hardware fracture or failure.

2. Subtle irregularity involving the left acetabulum.

Corresponding fracture is  not identified on prior exams. One

cannot exclude new interval fracture. Correlation with CT is

recommended.



Dictated by: 



  Dictated on workstation # [id number]

## 2017-11-13 NOTE — DIAGNOSTIC IMAGING REPORT
INDICATION: Right-sided back pain. Lower extremity swelling.



COMPARISON: None



FINDINGS: Single frontal radiographic view of the pelvis was

obtained and demonstrates postsurgical changes of the proximal

femur. Included portions of the hardware are intact and

appropriately positioned. No unexpected radiopaque foreign bodies

are seen. Abnormality involving the medial acetabulum described

on dedicated femur radiographs from same day is much less

conspicuous on today's exam. Otherwise, no acute abnormalities of

the pelvis are identified. Chronic appearing deformity of the

proximal right femur is noted. Included small bowel loops are

nondistended.



IMPRESSION:

1. Area of irregularity of the left acetabulum seen on separate

exam of the left femur is much less conspicuous on this exam.

However, CT followup is recommended to evaluate for new interval

fracture.



Dictated by: 



  Dictated on workstation # [id number]

## 2017-11-13 NOTE — DIAGNOSTIC IMAGING REPORT
PROCEDURE: CT chest without contrast.



TECHNIQUE: Multiple contiguous axial images were obtained through

the chest without the use of intravenous contrast.



INDICATION: Pneumonia with cough. Evaluate for pleural effusions.



COMPARISON: CT chest of 07/31/2017.



FINDINGS:



Lungs and airway: Severe centrilobular emphysema. Patchy

consolidations within the right lower lobe are most confluent in

posterior segment. There is mucus plugging throughout the

majority of the basilar segments of the right lower lobe. This

appearance is improved but not substantially changed since

07/31/2017. Left lung is clear.



Pleura: No pleural effusion or pneumothorax.



Heart and mediastinum: No supraclavicular or axillary

lymphadenopathy. No mediastinal, discrete hilar, or juxtaphrenic

lymphadenopathy. Stable cardiomegaly without pericardial

effusion. Enlargement of the pulmonary trunk suggests pulmonary

hypertension. Extensive atherosclerotic plaques are seen

throughout the aorta.



Upper abdomen: No acute or neoplastic abnormality in the upper

abdomen by noncontrast imaging.



Musculoskeletal: Subacute-to-chronic healing fractures of the

right posterolateral 10th and 9th ribs.



IMPRESSION:

1. No pleural effusion.

2. Right lower lobe patchy consolidations may relate to

aspiration, pneumonia, or chronic atelectasis. There is

associated mucus plugging in the right lower lobe which is

unchanged since prior exam.

3. Healing posterolateral right lower rib fractures.



Dictated by: 



  Dictated on workstation # [id number]

## 2017-11-13 NOTE — HISTORY & PHYSICAL-HOSPITALIST
HPI


History of Present Illness:


HPI/Chief Complaint


The patient is an 86-year-old white female known to me from a recent previous 

admission.  She fell at her home and was admitted here on 10/4 with a hip 

fracture which was repaired operatively.  She had a long history of obstructive 

lung disease and was generally frail she was hospitalized from 10/4 through 10/

13.  She was then on swing bed status from 10/13 through 10/17.  At that time 

she qualified for inpatient rehabilitation and was there from 10/17 through 11/

3 at which time she returned to her home.  She did well initially although she 

disregarded the minimal weightbearing requirement postoperatively.  Over the 

last 2 days she had begun to complain about generalized pain, weakness and 

especially right rib cage pain.  The daughter believes that she was more 

congested.  She also complained of groin pain which may be related to the 

weightbearing status.


Date Seen


17


Time Seen by Provider:  16:46


Attending Physician


Marilyn Castañeda MD


PCP


Cody Rai DO


Referring Physician





Date of Admission


2017 at 22:30





Home Medications & Allergies


Home Medications


Reviewed patient Home Medication Reconciliation Form





Allergies





Allergies


Coded Allergies


  No Known Drug Allergies ([id number])








Past Medical-Social-Family Hx


Patient Social History


Alcohol Use:  Denies Use


Recreational Drug Use:  No


Smoking Status:  Former Smoker


Former Smoker, Quit:  Oct 5, 2017


Type Used:  Cigarettes


2nd Hand Smoke Exposure:  Yes


Physical Abuse Screen:  No


Sexual Abuse:  No


Recent Foreign Travel:  No


Contact w/other who traveled:  No


Recent Hopitalizations:  Yes


Recent Infectious Disease Expo:  No





Immunizations Up To Date


Tetanus Booster (TDap):  Unknown


Pediatric:  Yes


Date of Pneumonia Vaccine:  2016


Date of Influenza Vaccine:  Oct 24, 2017





Seasonal Allergies


Seasonal Allergies:  No





Surgeries


Yes (LEFT HIP,HYST.,THYROIDECTOMY 95%, cataract)


Hysterectomy, Lumpectomy, Orthopedic, Renal (nephrectomy for treatment of renal 

cancer)





Respiratory


Yes (COPD)


Currently Using CPAP:  No


Currently Using BIPAP:  No





Cardiovascular


Yes


Atrial Fibrillation, Hypertension





Neurological


No





Reproductive System


Pregnant:  No


Hx Reproductive Disorders:  No


Sexually Transmitted Disease:  No


HIV/AIDS:  No


Female Reproductive Disorders:  Denies





Genitourinary


Yes (KIDNEY CANCER AND REMOVAL)





Gastrointestinal


No





Musculoskeletal


Yes


Osteoporosis, Chronic Back Pain, Fractures





Endocrine


History of Endocrine Disorders:  Yes


Endocrine Disorders:  Hypothyroidsim





HEENT


History of HEENT Disorders:  Yes


HEENT Disorders:  Cataract


Loss of Vision:  Denies


Hearing Impairment:  Hard of Hearing





Cancer


Yes


Kidney


Did You Recieve Any Treatments:  Yes


Type of Treatment:  Surgical Intervention





Psychosocial


History of Psychiatric Problem:  No





Integumentary


History of Skin or Integumenta:  Yes (bruises easily)





Blood Transfusions


History of Blood Disorders:  No


Adverse Reaction to a Blood Tr:  No





Family Medical History


Family Hx:  


Alcoholism


  19 FATHER


Arthritis


  19 MOTHER


Diabetes mellitus


  19 MOTHER


Hypertension


  19 MOTHER


Neoplasm (breast cancer)


  19 MOTHER


Osteoporosis


  19 MOTHER





Review of Systems


Constitutional:  see HPI


EENTM:  no symptoms reported


Respiratory:  cough, dyspnea on exertion, short of breath, other (chronic O2 use

)


Cardiovascular:  other (atrial fibrillation with anticoagulation by Xarelto)


Gastrointestinal:  no symptoms reported


Genitourinary:  no symptoms reported


Musculoskeletal:  back pain, other (rib pain and groin pain)


Skin:  no symptoms reported


Psychiatric/Neurological:  No Symptoms Reported





Physical Exam


Physical Exam


Vital Signs





Vital Sign - Last 12Hours








 17





 19:09


 


Temp 97.5


 


Pulse 88


 


Resp 24


 


B/P (MAP) 164/81


 


Pulse Ox 95


 


O2 Delivery Nasal Cannula


 


O2 Flow Rate 5.00





Capillary Refill : Less Than 3 SecondsLess Than 3 Seconds


General Appearance:  No Apparent Distress, WD/WN


Eyes:  Bilateral Eye Normal Inspection


HEENT:  Normal ENT Inspection


Neck:  Normal Inspection


Respiratory:  Decreased Breath Sounds (distant breath sounds)


Cardiovascular:  Irregularly Irregular


Gastrointestinal:  Normal Bowel Sounds, No Organomegaly, No Pulsatile Mass, Non 

Tender, Soft


Back:  CVA Tenderness (R)


Extremity:  Normal Capillary Refill, Normal Inspection, Normal Range of Motion, 

Non Tender, No Calf Tenderness, No Pedal Edema


Neurologic/Psychiatric:  Alert, Oriented x3, No Motor/Sensory Deficits, Normal 

Mood/Affect


Skin:  Normal Color, Warm/Dry


Lymphatic:  No Adenopathy





Results


Results/Procedures


Lab


Laboratory Tests


17 19:32














Assessment/Plan


Admission Diagnosis


COPD and chronic bronchitis, oxygen dependent.  2.recent hip fracture 

postrepair 3.right chest pain, CT scan today shows previously undiagnosed acute 

rib fracture on the right.





Clinical Quality Measures


DVT/VTE Risk/Contraindication:


Risk Factor Score Per Nursin


RFS Level Per Nursing on Admit:  4+=Very High











LARY ODMO MD 2017 16:51

## 2017-11-14 VITALS — SYSTOLIC BLOOD PRESSURE: 131 MMHG | DIASTOLIC BLOOD PRESSURE: 63 MMHG

## 2017-11-14 VITALS — DIASTOLIC BLOOD PRESSURE: 63 MMHG | SYSTOLIC BLOOD PRESSURE: 143 MMHG

## 2017-11-14 VITALS — SYSTOLIC BLOOD PRESSURE: 178 MMHG | DIASTOLIC BLOOD PRESSURE: 75 MMHG

## 2017-11-14 VITALS — DIASTOLIC BLOOD PRESSURE: 63 MMHG | SYSTOLIC BLOOD PRESSURE: 130 MMHG

## 2017-11-14 LAB
ANION GAP SERPL CALC-SCNC: 10 MMOL/L (ref 5–14)
BASOPHILS # BLD AUTO: 0 10^3/UL (ref 0–0.1)
BASOPHILS NFR BLD AUTO: 0 % (ref 0–10)
BUN SERPL-MCNC: 14 MG/DL (ref 7–18)
BUN/CREAT SERPL: 14
CALCIUM SERPL-MCNC: 9.7 MG/DL (ref 8.5–10.1)
CHLORIDE SERPL-SCNC: 102 MMOL/L (ref 98–107)
CO2 SERPL-SCNC: 28 MMOL/L (ref 21–32)
CREAT SERPL-MCNC: 1.02 MG/DL (ref 0.6–1.3)
EOSINOPHIL # BLD AUTO: 0.1 10^3/UL (ref 0–0.3)
EOSINOPHIL NFR BLD AUTO: 1 % (ref 0–10)
ERYTHROCYTE [DISTWIDTH] IN BLOOD BY AUTOMATED COUNT: 14.6 % (ref 10–14.5)
GFR SERPLBLD BASED ON 1.73 SQ M-ARVRAT: 51 ML/MIN
GLUCOSE SERPL-MCNC: 135 MG/DL (ref 70–105)
LYMPHOCYTES # BLD AUTO: 1 X 10^3 (ref 1–4)
LYMPHOCYTES NFR BLD AUTO: 11 % (ref 12–44)
MCH RBC QN AUTO: 28 PG (ref 25–34)
MCHC RBC AUTO-ENTMCNC: 31 G/DL (ref 32–36)
MCV RBC AUTO: 88 FL (ref 80–99)
MONOCYTES # BLD AUTO: 0.6 X 10^3 (ref 0–1)
MONOCYTES NFR BLD AUTO: 6 % (ref 0–12)
NEUTROPHILS # BLD AUTO: 7.5 X 10^3 (ref 1.8–7.8)
NEUTROPHILS NFR BLD AUTO: 82 % (ref 42–75)
PLATELET # BLD: 245 10^3/UL (ref 130–400)
PMV BLD AUTO: 10.6 FL (ref 7.4–10.4)
POTASSIUM SERPL-SCNC: 3.7 MMOL/L (ref 3.6–5)
RBC # BLD AUTO: 3.93 10^6/UL (ref 4.35–5.85)
SODIUM SERPL-SCNC: 140 MMOL/L (ref 135–145)
WBC # BLD AUTO: 9.1 10^3/UL (ref 4.3–11)

## 2017-11-14 RX ADMIN — IPRATROPIUM BROMIDE AND ALBUTEROL SULFATE SCH ML: .5; 3 SOLUTION RESPIRATORY (INHALATION) at 06:48

## 2017-11-14 RX ADMIN — IPRATROPIUM BROMIDE AND ALBUTEROL SULFATE SCH ML: .5; 3 SOLUTION RESPIRATORY (INHALATION) at 19:23

## 2017-11-14 RX ADMIN — LEVOTHYROXINE SODIUM SCH MCG: 0.09 TABLET ORAL at 09:13

## 2017-11-14 RX ADMIN — RIVAROXABAN SCH MG: 15 TABLET, FILM COATED ORAL at 20:33

## 2017-11-14 RX ADMIN — DICYCLOMINE HYDROCHLORIDE SCH EA: 20 TABLET ORAL at 06:04

## 2017-11-14 RX ADMIN — Medication SCH MG: at 09:11

## 2017-11-14 RX ADMIN — LORATADINE SCH MG: 10 TABLET ORAL at 09:13

## 2017-11-14 RX ADMIN — ALPRAZOLAM SCH MG: 0.25 TABLET ORAL at 20:33

## 2017-11-14 RX ADMIN — LEVOFLOXACIN SCH MG: 250 TABLET, FILM COATED ORAL at 20:34

## 2017-11-14 RX ADMIN — IPRATROPIUM BROMIDE AND ALBUTEROL SULFATE SCH ML: .5; 3 SOLUTION RESPIRATORY (INHALATION) at 22:22

## 2017-11-14 RX ADMIN — LEVOCETIRIZINE DIHYDROCHLORIDE SCH MG: 5 TABLET ORAL at 09:10

## 2017-11-14 RX ADMIN — IPRATROPIUM BROMIDE AND ALBUTEROL SULFATE SCH ML: .5; 3 SOLUTION RESPIRATORY (INHALATION) at 14:17

## 2017-11-14 RX ADMIN — MONTELUKAST SCH MG: 10 TABLET, FILM COATED ORAL at 09:12

## 2017-11-14 RX ADMIN — IPRATROPIUM BROMIDE AND ALBUTEROL SULFATE SCH ML: .5; 3 SOLUTION RESPIRATORY (INHALATION) at 02:53

## 2017-11-14 RX ADMIN — DILTIAZEM HYDROCHLORIDE SCH MG: 180 CAPSULE, COATED, EXTENDED RELEASE ORAL at 09:11

## 2017-11-14 RX ADMIN — NICOTINE SCH MG: 7 PATCH, EXTENDED RELEASE TRANSDERMAL at 14:07

## 2017-11-14 RX ADMIN — IPRATROPIUM BROMIDE AND ALBUTEROL SULFATE SCH ML: .5; 3 SOLUTION RESPIRATORY (INHALATION) at 10:06

## 2017-11-14 NOTE — OCCUPATIONAL THER DAILY NOTE
OT Current Status-Daily Note


Subjective


Pt alert, sitting in recliner.  Pt stated that she had already completed a 

shower and grooming earlier this morning.  Pt agreed to therapy.  Pt stated 

that her L LE felt better, but her back still hurt.





Mental Status/Objective


Patient Orientation:  Person, Place, Time, Situation


              Functional [place] Measure


0=Not Assessed/NA   4=Minimal Assistance


1=Total Assistance   5=Supervision or Setup


2=Maximal Assistance   6=Modified [place]


3=Moderate Assistance   7=Complete [place]





Other Treatment


Pt stated that she has had some difficulty at home.  When discussed further the 

difficulty was with transporting items with FWW and getting into and out of 

bathtub.  Pt had received adaptive equipment magazine while in rehab previously 

and this therapist had worked with pt at that time.  Pt was educated then and 

now on using tub transfer bench to get into and out of tub.  TURNER reiterated 

the equipment needed and the sequence on how to complete.  Also discussed 

energy conservation techniques and the use of AE to transport items safely with 

FWW.  Daughter came into room at that time and TURNER discussed needs with her 

also.   then came in to discuss d/c with pt.  All needs met in 

room.





OT Short Term Goals


Short Term Goals


1=Demonstrate adherence to instructed precautions during ADL tasks.


2=Patient will verbalize/demonstrate understanding of assistive devices/

modifications for ADL.


3=Patient will improve strength/tolerance for activity to enable patient to 

perform ADL's.





OT Long Term Goals


Long Term Goals


Time Frame:  Nov 20, 2017


Eating (FIM):  6


Grooming(FIM):  6


Bathing(FIM):  5


Upper Body Dressing(FIM):  6


Lower Body Dressing(FIM):  6


Toileting(FIM):  6


Toilet/Commode Transfer(FIM):  6


Shower Transfer(FIM):  5


Additional Goals:  2-Verbalize Understanding


1=Demonstrate adherence to instructed precautions during ADL tasks.


2=Patient will verbalize/demonstrate understanding of assistive devices/

modifications for ADL.


3=Patient will improve strength/tolerance for activity to enable patient to 

perform ADL's.





OT Education/Plan


Problem List/Assessment


Pt would benefit from skilled OT to increase her independence in basic self 

care to allow her to safely return to her home and to decrease caregiver burden.





Discharge Recommendations


Plan/Recommendations:  Continue POC





Treatment Plan/Plan of Care


Patient would benefit from OT for education, treatment and training to promote 

independence in ADL's, mobility, safety and/or upper extremity function for ADL'

s.


Plan of Care:  ADL Retraining, Functional Mobility, UE Funct Exercise/Act


Treatment Duration:  Nov 20, 2017


Frequency:  5 times per week


Estimated Hrs Per Day:  .5 hour per day


Agreement:  Yes


Rehab Potential:  Good





Time/GCodes


Start Time:  10:15


Stop Time:  10:30


Total Time Billed (hr/min):  15


Billed Treatment Time


1 visit-FA 1 (15 min)





PT/OT Therapy GCodes


Therapy


Functional Limitation:  Occupational Therapy


Test(s)/Tool used to determine:  FIM





Functional Limitation-Current


Charge Code:  SELFCUR


Modifier:  CJ





Functional Limitation-Goal


Charge Code:  SELFGOAL


Modifier:  RABIA PALOMINO Nov 14, 2017 10:52

## 2017-11-14 NOTE — PHYSICAL THERAPY DAILY NOTE
PT Daily Note-Current


Subjective


Pt was up and walking in room upon arrival and agrees to tx.





Pain





   Numeric Pain Scale:  3


   Location:  Right


   Location Body Site:  Thigh


   Pain Description:  Ache, Tightness


   Comment:  Pts groin area is painful.





Mental Status


Patient Orientation:  Person, Place, Time, Situation


Attachments:  Oxygen (4L)





Transfers


              Functional [place] Measure


0=Not Assessed/NA   4=Minimal Assistance


1=Total Assistance   5=Supervision or Setup


2=Maximal Assistance   6=Modified [place]


3=Moderate Assistance   7=Complete IndependenceIRFPAI Quality Coding Scale











6 Independent with activity with or without an assistive device


 


5  Patient requires set up or clean up by helper.  Patient completes activity  

by  themselves


 


4 Supervision or touching assist (CGA). Libby provide cues , steadying assist


 


3 The helper provides less than half the effort to complete the activity


 


2 The helper provides more than half the effort to complete the activity


 


1 Dependent.  The helper does all the effort to complete an activity 


 


7 Patient refused to complete or attempt activity


 


9 The patient did not perform the activity before the current illness or injury


 


88 Not attempted due to Medical conditions or safety concerns








Scootin


Rollin


Supine to/from Sit:  4





Weight Bearing


Right Lower Extremity:  Right


Full Weight Bearing


Left Lower Extremity:  Left


Touch Toe Bearing





Exercises


Supine Ex:  Ankle pumps, Quad Set, Glut sets, Heel Slides, Hip abd/add


Supine Reps:  20





Treatments


As pt was up walking around upon arrival, PT helped pt into bed to complete 

supine EX. Pt lying supine in be at end of tx with all needs met.





Assessment


Current Status:  Fair Progress


Pt needed MIN A with bed mobility and supine EX due to groin pain and lack of 

strength.





PT Long Term Goals


Long Term Goals


PT Long Term Goals Time Frame:  2017


Transfers (B,C,W/C) (FIM):  6


Gait (FIM):  6


Distance:  500'


Gait Level of Assist:  6


Gait Assistive Device:  FWW





PT Plan


Problem List


Problem List:  Activity Tolerance, Functional Strength, Safety





Treatment/Plan


Treatment Plan:  Continue Plan of Care


Treatment Plan:  Functional Activity Jennifer, Functional Strength, Gait, 

Therapeutic Exercise


Treatment Duration:  2017


Frequency:  11 times per week


Estimated Hrs Per Day:  .5 hour per day


Patient and/or Family Agrees t:  Yes





Safety Risks/Education


Patient Education:  Transfer Techniques, Correct Positioning, Safety Issues


Teaching Recipient:  Patient


Teaching Methods:  Demonstration


Response to Teaching:  Verbalize Understanding





Time/GCodes


Time In:  1245


Time Out:  1300


Total Billed Treatment Time:  15


Total Billed Treatment


1, EX (15m)


G Codes Necessary:  No





PT/OT Therapy GCodes


Therapy


Functional Limitation:  Occupational Therapy


Test(s)/Tool used to determine:  FIM





Functional Limitation-Current


Charge Code:  SELFCUR


Modifier:  CJ





Functional Limitation-Goal


Charge Code:  SELFGOAL


Modifier:  SHERIF RAMIREZ PTA 2017 14:18

## 2017-11-14 NOTE — DISCHARGE INST-SKILLED NURSING
Discharge Inst-Skilled NF


Chief Complaint


The patient is an 86-year-old white female known to me from a recent previous 

admission.  She fell at her home and was admitted here on 10/4 with a hip 

fracture which was repaired operatively.  She had a long history of obstructive 

lung disease and was generally frail she was hospitalized from 10/4 through 10/

13.  She was then on swing bed status from 10/13 through 10/17.  At that time 

she qualified for inpatient rehabilitation and was there from 10/17 through 11/

3 at which time she returned to her home.  She did well initially although she 

disregarded the minimal weightbearing requirement postoperatively.  Over the 

last 2 days she had begun to complain about generalized pain, weakness and 

especially right rib cage pain.  The daughter believes that she was more 

congested.  She also complained of groin pain which may be related to the 

weightbearing status.





Patient Instructions


Goal:  


Patient had repair intertrochanteric fracture left hip in October.  She


went to inpatient rehabilitation and then home about one week ago.  She


has declined in her performance and requires additional physical therapy


to restore ambulation.





Consult/Follow Up/Orders


Skilled NF Admit to:  [facility]


Certification ([facility])


I certify that SNF services are required to be given on an inpatient basis 

because of the above named patient's need for skilled nursing care on a 

continuing basis for the conditions(s) for which he/she was receiving inpatient 

hospital services prior to his/her transfer to the SNF.


y


Skilled Nursing Facility Order:  Occupational Ther-Evaluate & Treat, Physical 

Therapy-Evaluate & Treat


Discharge Diet:  No Restrictions


Daily Activity as Tolerated:  Yes





New & Resume Previous Orders


New & Resume Previous Orders


Medications as on the discharge sequence.


O2 per nasal cannula at 2 L/m or to keep SaO2 at 92 percent


Vasu Odom 


Nov 14, 2017 


14:12











VASU ODOM MD Nov 14, 2017 14:15

## 2017-11-14 NOTE — PHYSICAL THERAPY DAILY NOTE
PT Daily Note-Current


Subjective


Pt is sitting in recliner upon arrival and agrees to tx.





Pain





   Numeric Pain Scale:  3


   Location:  Right


   Location Body Site:  Thigh


   Pain Description:  Ache


   Comment:  Pts reports groin pain.





Mental Status


Patient Orientation:  Person, Place, Time, Situation


Attachments:  Oxygen (4L)





Transfers


              Functional [place] Measure


0=Not Assessed/NA   4=Minimal Assistance


1=Total Assistance   5=Supervision or Setup


2=Maximal Assistance   6=Modified [place]


3=Moderate Assistance   7=Complete IndependenceIRFPAI Quality Coding Scale











6 Independent with activity with or without an assistive device


 


5  Patient requires set up or clean up by helper.  Patient completes activity  

by  themselves


 


4 Supervision or touching assist (CGA). [place] provide cues , steadying assist


 


3 The helper provides less than half the effort to complete the activity


 


2 The helper provides more than half the effort to complete the activity


 


1 Dependent.  The helper does all the effort to complete an activity 


 


7 Patient refused to complete or attempt activity


 


9 The patient did not perform the activity before the current illness or injury


 


88 Not attempted due to Medical conditions or safety concerns








Sit to/from Stand:  5





Weight Bearing


Right Lower Extremity:  Right


Full Weight Bearing


Left Lower Extremity:  Left


Touch Toe Bearing





Gait Training


Distance (FIM):  3=150 ft


Distance:  225'


Gait Level of Assist:  5


Gait Persons Needed:  1


Gait Assistive Device:  FWW


Pt is not keeping WBing requirement even with VC. Slow, steady pace, no LOB.





Treatments


Pt transitioned from sitting to standing using FWW at SBA. Pt ambulated in 

hallways and returned to recliner at end of tx with all needs met.





Assessment


Current Status:  Good Progress


Pt can't keep WBing status, even after VC.





PT Long Term Goals


Long Term Goals


PT Long Term Goals Time Frame:  Nov 20, 2017


Transfers (B,C,W/C) (FIM):  6


Gait (FIM):  6


Distance:  500'


Gait Level of Assist:  6


Gait Assistive Device:  FWW





PT Plan


Problem List


Problem List:  Activity Tolerance, Functional Strength, Safety





Treatment/Plan


Treatment Plan:  Continue Plan of Care


Treatment Plan:  Functional Activity Jennifer, Functional Strength, Gait, 

Therapeutic Exercise


Treatment Duration:  Nov 20, 2017


Frequency:  11 times per week


Estimated Hrs Per Day:  .5 hour per day


Patient and/or Family Agrees t:  Yes





Safety Risks/Education


Patient Education:  Gait Training, Correct Positioning, Safety Issues


Teaching Recipient:  Patient


Teaching Methods:  Demonstration, Discussion


Response to Teaching:  Verbalize Understanding, Reinforcement Needed





Time/GCodes


Time In:  1100


Time Out:  1123


Total Billed Treatment Time:  23


Total Billed Treatment


1, GT x 2 (23m)


G Codes Necessary:  No





PT/OT Therapy GCodes


Therapy


Functional Limitation:  Occupational Therapy


Test(s)/Tool used to determine:  FIM





Functional Limitation-Current


Charge Code:  SELFCUR


Modifier:  CJ





Functional Limitation-Goal


Charge Code:  SELFGOAL


Modifier:  SHERIF RAMIREZ PTA Nov 14, 2017 11:52

## 2017-11-14 NOTE — PULMONARY PROGRESS NOTE
Exam


Exam





Vital Signs








  Date Time  Temp Pulse Resp B/P (MAP) Pulse Ox O2 Delivery O2 Flow Rate FiO2


 


17 06:52     91 Nasal Cannula 3.00 


 


17 04:20 99.1 91 18 131/63 92 Nasal Cannula 3.00 


 


17 02:53     92 Nasal Cannula 3.00 


 


17 00:24 98.0 85 18 130/63 93 Nasal Cannula 3.00 


 


17 22:12     91 Nasal Cannula 3.00 


 


17 20:00      Nasal Cannula 3.00 


 


17 20:00 97.0 89 18 148/64 97 Nasal Cannula 3.00 


 


17 18:32     98 Nasal Cannula 4.00 


 


17 16:00 98.0 88 24 158/88 95 Nasal Cannula 5.00 


 


17 14:25     98 Nasal Cannula 4.00 


 


17 12:00 98.6 92 24 143/64 96 Nasal Cannula 5.00 


 


17 09:23     94 Nasal Cannula 4.00 


 


17 08:00 98.0 84 24 193/84 95 Nasal Cannula 5.00 








General Appearance:  No Apparent Distress, WD/WN


HEENT:  Normal ENT Inspection


Neck:  Normal Inspection


Respiratory:  Decreased Breath Sounds (distant breath sounds)


Cardiovascular:  Irregularly Irregular


Capillary Refill:  Less Than 3 Seconds


Extremity:  Normal Capillary Refill, Normal Inspection, Normal Range of Motion, 

Non Tender, No Calf Tenderness, No Pedal Edema


Neurologic/Psychiatric:  Alert, Oriented x3, No Motor/Sensory Deficits, Normal 

Mood/Affect


Skin:  Normal Color, Warm/Dry


Lymphatic:  No Adenopathy





Results


Lab


Laboratory Tests


17 19:32











Assessment/Plan


Assessment/Plan


worsening SOB RLL effusion  hx of severe COPD r/o pneumonia -- No productive 

cough, leukocytosis  or fevers 


   - levaquin labs pending 


   -SVNs 


Hx of diastolic heart failure 





Recent hx of Intertrochanteric fracture of left femur- S/P repair last 

hospitalization





Atelectasis 


   -IS x 10breaths Q1hr WA


   -Increase activity


   -PT/OT 





Tobacco dependance 


   -education 


   -nicotine patch 


   


Hx of renal cell carcinoma s/p R nephrectomy 


 





CT  reviewed. labs pending 





232





Clinical Quality Measures


DVT/VTE Risk/Contraindication:


Risk Factor Score Per Nursin


RFS Level Per Nursing on Admit:  4+=Very High











TIO TORIBIO DO 2017 07:50

## 2017-11-14 NOTE — PROGRESS NOTE-HOSPITALIST
Standard Progress Note


Progress Notes/Assess & Plan


Date Seen


11/14/17


Time Seen by Provider:  14:51


Diagnosis


COPD and chronic bronchitis, oxygen dependent.  2.recent hip fracture 

postrepair 3.right chest pain, CT scan today shows previously undiagnosed acute 

rib fracture on the right.


Assess & Plan/Chief Complaint


The patient looks as well today as I have ever seen her.  She is sitting in the 

chair and is alert and lively.





Physical exam: Lungs show breath sounds to be distant but clear.  CV is 

regular.  Ankles show no pedal edema.





Impression: Severe COPD with chronic oxygen requirements.  2.right chest pain 

secondary to previously unappreciated rib fracture.  3.left intertrochanteric 

fracture repaired in October with continued pain in the left groin.





Plan: Transfer to [facility] in the morning with skilled status and physical 

therapy.  Medications and discharge papers have been posted


Labs


Laboratory Tests


11/12/17 19:32








11/14/17 08:45




















LARY ODOM MD Nov 14, 2017 14:53

## 2017-11-15 VITALS — DIASTOLIC BLOOD PRESSURE: 72 MMHG | SYSTOLIC BLOOD PRESSURE: 165 MMHG

## 2017-11-15 VITALS — SYSTOLIC BLOOD PRESSURE: 142 MMHG | DIASTOLIC BLOOD PRESSURE: 65 MMHG

## 2017-11-15 RX ADMIN — Medication SCH MG: at 08:07

## 2017-11-15 RX ADMIN — LEVOCETIRIZINE DIHYDROCHLORIDE SCH MG: 5 TABLET ORAL at 08:07

## 2017-11-15 RX ADMIN — DILTIAZEM HYDROCHLORIDE SCH MG: 180 CAPSULE, COATED, EXTENDED RELEASE ORAL at 08:08

## 2017-11-15 RX ADMIN — IPRATROPIUM BROMIDE AND ALBUTEROL SULFATE SCH ML: .5; 3 SOLUTION RESPIRATORY (INHALATION) at 02:36

## 2017-11-15 RX ADMIN — MONTELUKAST SCH MG: 10 TABLET, FILM COATED ORAL at 08:07

## 2017-11-15 RX ADMIN — LORATADINE SCH MG: 10 TABLET ORAL at 08:06

## 2017-11-15 RX ADMIN — DICYCLOMINE HYDROCHLORIDE SCH EA: 20 TABLET ORAL at 05:57

## 2017-11-15 RX ADMIN — LEVOTHYROXINE SODIUM SCH MCG: 0.09 TABLET ORAL at 08:06

## 2017-11-15 NOTE — PULMONARY PROGRESS NOTE
Exam


Exam





Vital Signs








  Date Time  Temp Pulse Resp B/P (MAP) Pulse Ox O2 Delivery O2 Flow Rate FiO2


 


11/15/17 02:37     90 Nasal Cannula 3.00 


 


11/15/17 00:00 97.8 90 22 142/65 92 Nasal Cannula 3.00 


 


17 22:25     93 Nasal Cannula 3.00 


 


17 20:00     90 Nasal Cannula 3.00 


 


17 19:25     94 Nasal Cannula 3.00 


 


17 16:00 98.6 90 22 143/63 95 Nasal Cannula 3.00 


 


17 10:06      Nasal Cannula 3.00 


 


17 08:00     90 Nasal Cannula 3.00 








General Appearance:  No Apparent Distress, WD/WN


HEENT:  Normal ENT Inspection


Neck:  Normal Inspection


Respiratory:  Decreased Breath Sounds (distant breath sounds)


Cardiovascular:  Irregularly Irregular


Capillary Refill:  Less Than 3 Seconds


Extremity:  Normal Capillary Refill, Normal Inspection, Normal Range of Motion, 

Non Tender, No Calf Tenderness, No Pedal Edema


Neurologic/Psychiatric:  Alert, Oriented x3, No Motor/Sensory Deficits, Normal 

Mood/Affect


Skin:  Normal Color, Warm/Dry


Lymphatic:  No Adenopathy





Results


Lab


Laboratory Tests


17 08:45











Assessment/Plan


Assessment/Plan


resolving pneumonia 


   - levaquin x 7 days then D/C 


   -SVNs 


Hx of diastolic heart failure 





Recent hx of Intertrochanteric fracture of left femur- S/P repair last 

hospitalization





Atelectasis 


   -IS x 10breaths Q1hr WA


   -Increase activity


   -PT/OT 





Tobacco dependance 


   -education 


   -nicotine patch 


   


Hx of renal cell carcinoma s/p R nephrectomy 


 





CT  reviewed. labs pending 





232





Clinical Quality Measures


DVT/VTE Risk/Contraindication:


Risk Factor Score Per Nursin


RFS Level Per Nursing on Admit:  4+=Very High











TIO TORIBIO DO Nov 15, 2017 07:52

## 2017-11-16 NOTE — OCC THERAPY PROGRESS NOTE
Therapy Progress Note


At discharge, G-codes were the following....


Selfcur-CJ


Selfgoal-CI


SelfDC-CJ











OMID PIÑA OT Nov 16, 2017 11:13

## 2017-12-07 ENCOUNTER — HOSPITAL ENCOUNTER (OUTPATIENT)
Dept: HOSPITAL 75 - SDC | Age: 82
End: 2017-12-07
Attending: INTERNAL MEDICINE
Payer: MEDICARE

## 2017-12-07 VITALS — WEIGHT: 100 LBS | BODY MASS INDEX: 19.63 KG/M2 | HEIGHT: 60 IN

## 2017-12-07 VITALS — SYSTOLIC BLOOD PRESSURE: 159 MMHG | DIASTOLIC BLOOD PRESSURE: 84 MMHG

## 2017-12-07 VITALS — DIASTOLIC BLOOD PRESSURE: 84 MMHG | SYSTOLIC BLOOD PRESSURE: 159 MMHG

## 2017-12-07 DIAGNOSIS — M81.0: Primary | ICD-10-CM

## 2017-12-07 PROCEDURE — 96365 THER/PROPH/DIAG IV INF INIT: CPT

## 2017-12-12 ENCOUNTER — HOSPITAL ENCOUNTER (OUTPATIENT)
Dept: HOSPITAL 75 - RAD | Age: 82
End: 2017-12-12
Attending: INTERNAL MEDICINE
Payer: MEDICARE

## 2017-12-12 DIAGNOSIS — J18.9: ICD-10-CM

## 2017-12-12 DIAGNOSIS — M47.814: Primary | ICD-10-CM

## 2017-12-12 PROCEDURE — 72072 X-RAY EXAM THORAC SPINE 3VWS: CPT

## 2017-12-12 NOTE — DIAGNOSTIC IMAGING REPORT
INDICATION: Back pain, pneumonia.



Study interpreted in correlation with chest CT 11/13/2017.



FINDINGS: Right perihilar and infrahilar partial atelectasis and

a curvilinear opacity have progressed. There is mild stature loss

of upper thoracic vertebral body level stable from prior CT

without acute endplate irregularity. No listhesis. No change when

the differing modalities taken into account of the spine.



IMPRESSION: Thoracic spondylosis with no acute spinal fracture or

traumatic malalignment. Increased irregular infrahilar opacity on

the right. We note previous chest CT revealed airway filling

defects from mucoid-like debris and progressive postobstructive

atelectasis presumed to account for the change.



Dictated by: 



  Dictated on workstation # [id number]

## 2018-01-19 ENCOUNTER — HOSPITAL ENCOUNTER (OUTPATIENT)
Dept: HOSPITAL 75 - RAD | Age: 83
End: 2018-01-19
Attending: NURSE PRACTITIONER
Payer: MEDICARE

## 2018-01-19 DIAGNOSIS — J44.9: Primary | ICD-10-CM

## 2018-01-19 DIAGNOSIS — Z72.0: ICD-10-CM

## 2018-01-19 PROCEDURE — 71250 CT THORAX DX C-: CPT

## 2018-01-19 NOTE — DIAGNOSTIC IMAGING REPORT
PROCEDURE: CT chest without contrast.



TECHNIQUE: Multiple contiguous axial images were obtained through

the chest without the use of intravenous contrast.



INDICATION:  Cough and COPD.



COMPARISON: Comparison is made with prior CT from 11/13/2017.



FINDINGS: No definite axillary, hilar or mediastinal

lymphadenopathy is detected. Coronary artery calcifications are

again noted. No pericardial or pleural fluid is detected.

Centrilobular emphysematous changes are again noted throughout

both lungs. There has been significant improved appearance to the

chest. In particular, the area of parenchymal consolidation in

the posterior basilar segment of the right lower lobe has

cleared. The previously noted mucous plugging involving right

lower lobe bronchi has cleared. No new parenchymal opacity or

mass is identified. There does continue to be some atelectasis or

infiltrate in the right middle lobe. The upper abdomen is

unremarkable.



IMPRESSION: Improved appearance to chest with resolution of

previously noted right lower lobe pneumonia and associated mucous

plugging when compared with examination from 11/13/2017.



Dictated by: 



  Dictated on workstation # [id number]

## 2018-03-22 ENCOUNTER — HOSPITAL ENCOUNTER (OUTPATIENT)
Dept: HOSPITAL 75 - RAD | Age: 83
End: 2018-03-22
Attending: INTERNAL MEDICINE
Payer: MEDICARE

## 2018-03-22 DIAGNOSIS — S72.002D: Primary | ICD-10-CM

## 2018-03-22 DIAGNOSIS — S32.592D: ICD-10-CM

## 2018-03-22 NOTE — DIAGNOSTIC IMAGING REPORT
INDICATION: Left groin pain. Patient has a prior history of left

hip fracture.



TIME OF EXAM: 01:15 p.m.



Correlation is made with prior pelvis radiograph from 11/12/2017.



FINDINGS: There is an intramedullary maxim and compression screw

transfixing the left hip fracture. Fracture line is blurred

consistent with healing. The fracture line appears to be barely

visible along the medial cortex. There is also a healing fracture

of the left inferior pubic ramus. Overall alignment is anatomic.

Femoroacetabular alignment is normal. Symphysis is non-widened.

There is generalized demineralization.



IMPRESSION: Healing left hip and inferior pubic ramus fractures.

No acute feature is detected.



Dictated by: 



  Dictated on workstation # [id number]

## 2018-07-22 ENCOUNTER — HOSPITAL ENCOUNTER (INPATIENT)
Dept: HOSPITAL 75 - ER | Age: 83
LOS: 4 days | Discharge: NURSING, REHAB OR LONG-TERM CARE | DRG: 246 | End: 2018-07-26
Attending: INTERNAL MEDICINE | Admitting: INTERNAL MEDICINE
Payer: MEDICARE

## 2018-07-22 VITALS — SYSTOLIC BLOOD PRESSURE: 194 MMHG | DIASTOLIC BLOOD PRESSURE: 93 MMHG

## 2018-07-22 VITALS — WEIGHT: 109.5 LBS | BODY MASS INDEX: 20.67 KG/M2 | HEIGHT: 61 IN

## 2018-07-22 VITALS — SYSTOLIC BLOOD PRESSURE: 182 MMHG | DIASTOLIC BLOOD PRESSURE: 99 MMHG

## 2018-07-22 VITALS — SYSTOLIC BLOOD PRESSURE: 148 MMHG | DIASTOLIC BLOOD PRESSURE: 104 MMHG

## 2018-07-22 VITALS — SYSTOLIC BLOOD PRESSURE: 144 MMHG | DIASTOLIC BLOOD PRESSURE: 87 MMHG

## 2018-07-22 VITALS — SYSTOLIC BLOOD PRESSURE: 167 MMHG | DIASTOLIC BLOOD PRESSURE: 92 MMHG

## 2018-07-22 VITALS — SYSTOLIC BLOOD PRESSURE: 174 MMHG | DIASTOLIC BLOOD PRESSURE: 90 MMHG

## 2018-07-22 VITALS — DIASTOLIC BLOOD PRESSURE: 94 MMHG | SYSTOLIC BLOOD PRESSURE: 185 MMHG

## 2018-07-22 VITALS — SYSTOLIC BLOOD PRESSURE: 153 MMHG | DIASTOLIC BLOOD PRESSURE: 95 MMHG

## 2018-07-22 VITALS — SYSTOLIC BLOOD PRESSURE: 173 MMHG | DIASTOLIC BLOOD PRESSURE: 88 MMHG

## 2018-07-22 VITALS — SYSTOLIC BLOOD PRESSURE: 169 MMHG | DIASTOLIC BLOOD PRESSURE: 78 MMHG

## 2018-07-22 DIAGNOSIS — D64.9: ICD-10-CM

## 2018-07-22 DIAGNOSIS — J98.11: ICD-10-CM

## 2018-07-22 DIAGNOSIS — Z99.81: ICD-10-CM

## 2018-07-22 DIAGNOSIS — I21.4: Primary | ICD-10-CM

## 2018-07-22 DIAGNOSIS — E87.5: ICD-10-CM

## 2018-07-22 DIAGNOSIS — J18.9: ICD-10-CM

## 2018-07-22 DIAGNOSIS — I25.10: ICD-10-CM

## 2018-07-22 DIAGNOSIS — E03.9: ICD-10-CM

## 2018-07-22 DIAGNOSIS — I10: ICD-10-CM

## 2018-07-22 DIAGNOSIS — J43.9: ICD-10-CM

## 2018-07-22 DIAGNOSIS — Z87.891: ICD-10-CM

## 2018-07-22 DIAGNOSIS — I48.0: ICD-10-CM

## 2018-07-22 LAB
ALBUMIN SERPL-MCNC: 3.8 GM/DL (ref 3.2–4.5)
ALP SERPL-CCNC: 76 U/L (ref 40–136)
ALT SERPL-CCNC: 138 U/L (ref 0–55)
APTT BLD: 38 SEC (ref 24–35)
BASOPHILS # BLD AUTO: 0 10^3/UL (ref 0–0.1)
BASOPHILS NFR BLD AUTO: 0 % (ref 0–10)
BILIRUB SERPL-MCNC: 0.4 MG/DL (ref 0.1–1)
BUN/CREAT SERPL: 15
CALCIUM SERPL-MCNC: 8.7 MG/DL (ref 8.5–10.1)
CHLORIDE SERPL-SCNC: 106 MMOL/L (ref 98–107)
CO2 SERPL-SCNC: 22 MMOL/L (ref 21–32)
CREAT SERPL-MCNC: 1.12 MG/DL (ref 0.6–1.3)
EOSINOPHIL # BLD AUTO: 0.1 10^3/UL (ref 0–0.3)
EOSINOPHIL NFR BLD AUTO: 1 % (ref 0–10)
ERYTHROCYTE [DISTWIDTH] IN BLOOD BY AUTOMATED COUNT: 16.5 % (ref 10–14.5)
GFR SERPLBLD BASED ON 1.73 SQ M-ARVRAT: 46 ML/MIN
GLUCOSE SERPL-MCNC: 232 MG/DL (ref 70–105)
HCT VFR BLD CALC: 34 % (ref 35–52)
HGB BLD-MCNC: 10.6 G/DL (ref 11.5–16)
INR PPP: 1.9 (ref 0.8–1.4)
LIPASE SERPL-CCNC: 24 U/L (ref 8–78)
LYMPHOCYTES # BLD AUTO: 1.3 X 10^3 (ref 1–4)
LYMPHOCYTES NFR BLD AUTO: 14 % (ref 12–44)
MAGNESIUM SERPL-MCNC: 1.9 MG/DL (ref 1.8–2.4)
MANUAL DIFFERENTIAL PERFORMED BLD QL: NO
MCH RBC QN AUTO: 26 PG (ref 25–34)
MCHC RBC AUTO-ENTMCNC: 32 G/DL (ref 32–36)
MCV RBC AUTO: 84 FL (ref 80–99)
MONOCYTES # BLD AUTO: 0.5 X 10^3 (ref 0–1)
MONOCYTES NFR BLD AUTO: 6 % (ref 0–12)
MYOGLOBIN SERPL-MCNC: 50.3 NG/ML (ref 10–92)
NEUTROPHILS # BLD AUTO: 7.3 X 10^3 (ref 1.8–7.8)
NEUTROPHILS NFR BLD AUTO: 79 % (ref 42–75)
PLATELET # BLD: 225 10^3/UL (ref 130–400)
PMV BLD AUTO: 12.6 FL (ref 7.4–10.4)
POTASSIUM SERPL-SCNC: 4.3 MMOL/L (ref 3.6–5)
PROT SERPL-MCNC: 6.3 GM/DL (ref 6.4–8.2)
PROTHROMBIN TIME: 21.7 SEC (ref 12.2–14.7)
RBC # BLD AUTO: 4.01 10^6/UL (ref 4.35–5.85)
SODIUM SERPL-SCNC: 140 MMOL/L (ref 135–145)
WBC # BLD AUTO: 9.3 10^3/UL (ref 4.3–11)

## 2018-07-22 PROCEDURE — 36415 COLL VENOUS BLD VENIPUNCTURE: CPT

## 2018-07-22 PROCEDURE — 85610 PROTHROMBIN TIME: CPT

## 2018-07-22 PROCEDURE — 85730 THROMBOPLASTIN TIME PARTIAL: CPT

## 2018-07-22 PROCEDURE — 71046 X-RAY EXAM CHEST 2 VIEWS: CPT

## 2018-07-22 PROCEDURE — 85347 COAGULATION TIME ACTIVATED: CPT

## 2018-07-22 PROCEDURE — 80048 BASIC METABOLIC PNL TOTAL CA: CPT

## 2018-07-22 PROCEDURE — 87040 BLOOD CULTURE FOR BACTERIA: CPT

## 2018-07-22 PROCEDURE — 94640 AIRWAY INHALATION TREATMENT: CPT

## 2018-07-22 PROCEDURE — 36600 WITHDRAWAL OF ARTERIAL BLOOD: CPT

## 2018-07-22 PROCEDURE — 93458 L HRT ARTERY/VENTRICLE ANGIO: CPT

## 2018-07-22 PROCEDURE — 85007 BL SMEAR W/DIFF WBC COUNT: CPT

## 2018-07-22 PROCEDURE — 82805 BLOOD GASES W/O2 SATURATION: CPT

## 2018-07-22 PROCEDURE — 83880 ASSAY OF NATRIURETIC PEPTIDE: CPT

## 2018-07-22 PROCEDURE — 70450 CT HEAD/BRAIN W/O DYE: CPT

## 2018-07-22 PROCEDURE — 71045 X-RAY EXAM CHEST 1 VIEW: CPT

## 2018-07-22 PROCEDURE — 83735 ASSAY OF MAGNESIUM: CPT

## 2018-07-22 PROCEDURE — 80053 COMPREHEN METABOLIC PANEL: CPT

## 2018-07-22 PROCEDURE — 93005 ELECTROCARDIOGRAM TRACING: CPT

## 2018-07-22 PROCEDURE — 80061 LIPID PANEL: CPT

## 2018-07-22 PROCEDURE — 84443 ASSAY THYROID STIM HORMONE: CPT

## 2018-07-22 PROCEDURE — 84484 ASSAY OF TROPONIN QUANT: CPT

## 2018-07-22 PROCEDURE — 83874 ASSAY OF MYOGLOBIN: CPT

## 2018-07-22 PROCEDURE — 94760 N-INVAS EAR/PLS OXIMETRY 1: CPT

## 2018-07-22 PROCEDURE — 83690 ASSAY OF LIPASE: CPT

## 2018-07-22 PROCEDURE — 93306 TTE W/DOPPLER COMPLETE: CPT

## 2018-07-22 PROCEDURE — 81000 URINALYSIS NONAUTO W/SCOPE: CPT

## 2018-07-22 PROCEDURE — 85027 COMPLETE CBC AUTOMATED: CPT

## 2018-07-22 PROCEDURE — 93041 RHYTHM ECG TRACING: CPT

## 2018-07-22 PROCEDURE — 36221 PLACE CATH THORACIC AORTA: CPT

## 2018-07-22 PROCEDURE — 83605 ASSAY OF LACTIC ACID: CPT

## 2018-07-22 PROCEDURE — 94664 DEMO&/EVAL PT USE INHALER: CPT

## 2018-07-22 PROCEDURE — 85025 COMPLETE CBC W/AUTO DIFF WBC: CPT

## 2018-07-22 PROCEDURE — 84100 ASSAY OF PHOSPHORUS: CPT

## 2018-07-22 RX ADMIN — MORPHINE SULFATE PRN MG: 4 INJECTION, SOLUTION INTRAMUSCULAR; INTRAVENOUS at 22:24

## 2018-07-22 RX ADMIN — MORPHINE SULFATE PRN MG: 4 INJECTION, SOLUTION INTRAMUSCULAR; INTRAVENOUS at 20:00

## 2018-07-22 RX ADMIN — ALPRAZOLAM SCH MG: 0.25 TABLET ORAL at 22:23

## 2018-07-22 RX ADMIN — METOPROLOL TARTRATE SCH MG: 50 TABLET, FILM COATED ORAL at 22:24

## 2018-07-22 RX ADMIN — ENOXAPARIN SODIUM SCH MG: 100 INJECTION SUBCUTANEOUS at 19:59

## 2018-07-22 RX ADMIN — ALBUTEROL SULFATE PRN MG: 2.5 SOLUTION RESPIRATORY (INHALATION) at 20:58

## 2018-07-22 RX ADMIN — RIVAROXABAN SCH MG: 15 TABLET, FILM COATED ORAL at 22:23

## 2018-07-22 NOTE — ED SYNCOPE
General


Chief Complaint:  Dizziness/Syncope


Stated Complaint:  NOT FEELING WELL


Source of Information:  Patient, EMS, Family (daughter)


Exam Limitations:  No Limitations





History of Present Illness


Date Seen by Provider:  Jul 22, 2018


Time Seen by Provider:  15:41


Initial Comments


Patient presents ER by EMS with chief complaint that she just finished a 

birthday party with her grandson and while sitting in the car started to have 

chest pain and back pain that was sharp sudden and then she started to slump 

over fall sleep but within a few seconds her daughter says she get her woke 

back up. She did this 2 or 3 more times and was continuing to have off and on 

chest discomfort so they brought her here. She does not have a history of 

coronary disease but she does have a history of atrial fibrillation that 

started after her left hip gamma nail was placed about a month or so ago. While 

she was in the hospital she had a run of atrial fibrillation and they had to 

work on her in the ICU according to her daughter. Patient says she was feeling, 

short of breath but this is improved since she got to the hospital. No slurring 

of speech, focal deficits, facial asymmetry. She says she does not have a 

history of diabetes but EMS reports her blood sugar was over 200. She is on 

Xarelto. She follows with Dr. Rai. She uses diltiazem to control her 

rhythm and rate. EMS reports on her 12-lead that she was in sinus rhythm.





Allergies and Home Medications


Allergies


Coded Allergies:  


     No Known Drug Allergies (Verified , 10/6/14)





Home Medications


Acetaminophen 325 Mg Tablet, 650 MG PO Q4H PRN for PAIN-MILD


   Prescribed by: LISA MARIA on 10/27/17 0936


Albuterol Sulfate 2.5 Mg/3 Ml Vial.neb, 2.5 MG NEB Q4H PRN for SHORTNESS OF 

BREATH, (Reported)


Alprazolam 0.25 Mg Tablet, 0.25 MG PO HS


   Prescribed by: LARY ODOM on 11/14/17 1418


Diltiazem HCl 180 Mg Cap.er.24h, 180 MG PO DAILY, (Reported)


Fluticasone/Vilanterol 1 Each Blst.w.dev, 1 PUFF IH DAILY PRN for SHORTNESS OF 

BREATH, (Reported)


Levocetirizine Dihydrochloride 5 Mg Tablet, 5 MG PO DAILY, (Reported)


Levothyroxine Sodium 88 Mcg Tablet, 88 MCG PO DAILY, (Reported)


Loratadine 10 Mg Tablet, 10 MG PO DAILY, (Reported)


Magnesium Oxide 400 Mg Tablet, 400 MG PO DAILY, (Reported)


Montelukast Sodium 10 Mg Tablet, 10 MG PO DAILY, (Reported)


Nicotine 1 Each Patch.td24, 7 MG TD DAILY@1400


   Prescribed by: LISA MARIA on 10/27/17 0936


Omeprazole 40 Mg Capsule.dr, 40 MG PO DAILY, (Reported)


Rivaroxaban 15 Mg Tablet, 15 MG PO HS, (Reported)


Triamcinolone Acetonide 10.8 Ml Spray, 2 SPRAYS NS DAILY PRN for ALLERGIES, (

Reported)





Patient Home Medication List


Home Medication List Reviewed:  Yes





Constitutional:  No chills, No diaphoresis, No fever, No malaise


EENTM:  No ear discharge, No ear pain


Respiratory:  No cough; short of breath; No wheezing


Cardiovascular:  see HPI, chest pain; No edema; syncope


Gastrointestinal:  No abdominal pain, No constipation, No diarrhea, No nausea, 

No vomiting


Genitourinary:  No discharge, No dysuria, No frequency


Musculoskeletal:  No back pain, No joint pain


Skin:  No pruritus, No rash





Past Medical-Social-Family Hx


Patient Social History


Alcohol Use:  Denies Use


Recreational Drug Use:  No


Smoking Status:  Former Smoker


Type Used:  Cigarettes


Former Smoker, Quit:  Oct 5, 2017


2nd Hand Smoke Exposure:  Yes


Recent Foreign Travel:  No


Contact w/Someone Who Travel:  No


Recent Hopitalizations:  Yes





Immunizations Up To Date


Tetanus Booster (TDap):  Unknown


PED Vaccines UTD:  Yes


Date of Pneumonia Vaccine:  Nov 1, 2016


Date of Influenza Vaccine:  Oct 24, 2017





Seasonal Allergies


Seasonal Allergies:  No





Past Medical History


Surgeries:  Yes (LEFT HIP,HYST.,THYROIDECTOMY 95%, cataract)


Hysterectomy, Lumpectomy, Orthopedic, Renal


Respiratory:  Yes (COPD)


Pneumonia, COPD, Emphysema


Currently Using CPAP:  No


Currently Using BIPAP:  No


Cardiac:  Yes


Atrial Fibrillation, Hypertension


Neurological:  No


Reproductive Disorders:  No


Female Reproductive Disorders:  Denies


Sexually Transmitted Disease:  No


HIV/AIDS:  No


Genitourinary:  Yes (KIDNEY CANCER AND REMOVAL)


Gastrointestinal:  No


Musculoskeletal:  Yes


Osteoporosis, Chronic Back Pain, Fractures


Endocrine:  Yes


Hypothyroidsim


HEENT:  Yes


Cataract


Loss of Vision:  Denies


Hearing Impairment:  Hard of Hearing


Cancer:  Yes


Kidney


Did You Recieve Any Treatments:  Yes


What Type of Treatment Did You:  Surgical Intervention


Psychosocial:  No


Integumentary:  Yes (bruises easily)


Blood Disorders:  No


Adverse Reaction/Blood Tranf:  No





Family Medical History





Alcoholism


  19 FATHER


Arthritis


  19 MOTHER


Diabetes mellitus


  19 MOTHER


Hypertension


  19 MOTHER


Neoplasm (breast cancer)


  19 MOTHER


Osteoporosis


  19 MOTHER





Physical Exam


Vital Signs





Vital Signs - First Documented








 7/22/18





 15:23


 


Temp 98.0


 


Pulse 88


 


Resp 18


 


B/P (MAP) 172/74 (106)


 


Pulse Ox 8


 


O2 Delivery Nasal Cannula


 


O2 Flow Rate 3.00





Capillary Refill :


Height, Weight, BMI


Height: 5'0.00"


Weight: 100lbs. 0.0oz. 45.895496px; 19.5 BMI


Method:Stated


General Appearance:  No Apparent Distress, WD/WN, Thin


HEENT:  PERRL/EOMI, Normal ENT Inspection, Pharynx Normal


Neck:  Full Range of Motion, Normal Inspection, Non Tender, Supple


Cardiovascular:  Regular Rate, Rhythm, No Edema, Normal Peripheral Pulses


Respiratory:  Chest Non Tender, Lungs Clear, Normal Breath Sounds, No Accessory 

Muscle Use, No Respiratory Distress


Gastrointestinal:  Normal Bowel Sounds, Non Tender, Soft


Extremities:  Normal Capillary Refill, Normal Inspection, No Pedal Edema


Neurologic/Psychiatric:  Alert, Oriented x3, No Motor/Sensory Deficits, Normal 

Mood/Affect, CNs II-XII Norm as Tested


Cranial Nerves:  Normal Hearing, Normal Speech, PERRL


Coordination/Gait:  Normal Finger to Nose, Negative Romberg's Sign


Motor/Sensory:  No Motor Deficit, No Sensory Deficit, No Pronator Drift


Skin:  Normal Color, Warm/Dry





Progress/Results/Core Measures


Results/Orders


Lab Results





Laboratory Tests








Test


 7/22/18


00:00 7/22/18


15:33 7/22/18


16:31 Range/Units


 


 


White Blood Count


 


 9.3 


 


 4.3-11.0


10^3/uL


 


Red Blood Count


 


 4.01 L


 


 4.35-5.85


10^6/uL


 


Hemoglobin  10.6 L  11.5-16.0  G/DL


 


Hematocrit  34 L  35-52  %


 


Mean Corpuscular Volume  84   80-99  FL


 


Mean Corpuscular Hemoglobin  26   25-34  PG


 


Mean Corpuscular Hemoglobin


Concent 


 32 


 


 32-36  G/DL





 


Red Cell Distribution Width  16.5 H  10.0-14.5  %


 


Platelet Count


 


 225 


 


 130-400


10^3/uL


 


Mean Platelet Volume  12.6 H  7.4-10.4  FL


 


Neutrophils (%) (Auto)  79 H  42-75  %


 


Lymphocytes (%) (Auto)  14   12-44  %


 


Monocytes (%) (Auto)  6   0-12  %


 


Eosinophils (%) (Auto)  1   0-10  %


 


Basophils (%) (Auto)  0   0-10  %


 


Neutrophils # (Auto)  7.3   1.8-7.8  X 10^3


 


Lymphocytes # (Auto)  1.3   1.0-4.0  X 10^3


 


Monocytes # (Auto)  0.5   0.0-1.0  X 10^3


 


Eosinophils # (Auto)


 


 0.1 


 


 0.0-0.3


10^3/uL


 


Basophils # (Auto)


 


 0.0 


 


 0.0-0.1


10^3/uL


 


Sodium Level  140   135-145  MMOL/L


 


Potassium Level  4.3   3.6-5.0  MMOL/L


 


Chloride Level  106     MMOL/L


 


Carbon Dioxide Level  22   21-32  MMOL/L


 


Anion Gap  12   5-14  MMOL/L


 


Blood Urea Nitrogen  17   7-18  MG/DL


 


Creatinine


 


 1.12 


 


 0.60-1.30


MG/DL


 


Estimat Glomerular Filtration


Rate 


 46 


 


  





 


BUN/Creatinine Ratio  15    


 


Glucose Level  232 H    MG/DL


 


Calcium Level  8.7   8.5-10.1  MG/DL


 


Magnesium Level  1.9   1.8-2.4  MG/DL


 


Total Bilirubin  0.4   0.1-1.0  MG/DL


 


Aspartate Amino Transf


(AST/SGOT) 


 235 H


 


 5-34  U/L





 


Alanine Aminotransferase


(ALT/SGPT) 


 138 H


 


 0-55  U/L





 


Alkaline Phosphatase  76     U/L


 


Myoglobin


 


 50.3 


 


 10.0-92.0


NG/ML


 


Troponin I  0.83 *H  <0.30  NG/ML


 


B-Type Natriuretic Peptide  349.9 H  <100.0  PG/ML


 


Total Protein  6.3 L  6.4-8.2  GM/DL


 


Albumin  3.8   3.2-4.5  GM/DL


 


Lipase  24   8-78  U/L


 


Prothrombin Time   21.7 H 12.2-14.7  SEC


 


INR Comment   1.9 H 0.8-1.4  


 


Activated Partial


Thromboplast Time 


 


 38 H


 24-35  SEC











My Orders





Orders - JOVAN BERGER


Cbc With Automated Diff (7/22/18 15:51)


Magnesium (7/22/18 15:51)


Chest 1 View, Ap/Pa Only (7/22/18 15:51)


Ekg Tracing (7/22/18 15:51)


Cardiac Profile 1 (7/22/18 15:51)


Comprehensive Metabolic Panel (7/22/18 15:51)


Myoglobin Serum (7/22/18 15:51)


O2 (7/22/18 15:51)


Monitor-Rhythm Ecg Trace Only (7/22/18 15:51)


Lipid Panel (7/23/18 06:00)


Aspirin Chewable Tablet (Baby Aspirin Ch (7/22/18 16:00)


Saline Lock/Iv-Start (7/22/18 15:51)


Lipase (7/22/18 15:51)


BNP (7/22/18 15:51)


Ct Head Wo (7/22/18 15:51)


Thyroid Stimulating Hormone (7/22/18 15:59)


Protime With Inr (7/22/18 16:31)


Partial Thromboplastin Time (7/22/18 16:31)


Clopidogrel Tablet (Plavix Tablet) (7/22/18 17:00)


Atorvastatin Tablet (Lipitor) (7/22/18 17:00)





Medications Given in ED





Current Medications








 Medications  Dose


 Ordered  Sig/[place]


 Route  Start Time


 Stop Time Status Last Admin


Dose Admin


 


 Aspirin  324 mg  ONCE  ONCE


 PO  7/22/18 16:00


 7/22/18 16:01 DC 7/22/18 16:25


324 MG








Vital Signs/I&O











 7/22/18 7/22/18





 15:23 15:23


 


Temp  98.0


 


Pulse  88


 


Resp  18


 


B/P (MAP)  172/74 (106)


 


Pulse Ox  8


 


O2 Delivery Nasal Cannula Room Air


 


O2 Flow Rate 3.00 











Progress


Progress Note :  


   Time:  16:02


Progress Note


Syncopal episode with NIH score of 0. No neurologic deficits. Most consistent 

with a run of A. fib and rapid ventricular response causing a syncopal episode. 

We'll also get a CT of her head, x-ray of her chest, labs, TSH and an EKG.


Initial ECG Impression Date:  Jul 22, 2018


Initial ECG Impression Time:  16:07


Initial ECG Rate:  81


Initial ECG Rhythm:  Normal Sinus


Initial ECG Intervals:  Normal


Initial ECG Impression:  Normal, Nonspecific Changes


Initial ECG Comparisson:  Unchanged


Comment


No ST elevation or depression.





Diagnostic Imaging





   Diagonstic Imaging:  Xray


   Plain Films/CT/US/NM/MRI:  chest (1v)


   Reviewed:  Reviewed by Me








   Diagonstic Imaging:  CT (W/O contrast)


   Plain Films/CT/US/NM/MRI:  head


   Reviewed:  Reviewed by Me





Departure


Communication (Admissions)


Time/Spoke to Admitting Phy:  16:55


Discussed case lab EKG imaging findings with Dr. Mcfadden and she will see the 

patient.


Time/Spoke to Consulting Phy:  16:39


Discussed in STEMI with Dr. Jordan and he recommends a bolus of aspirin, Plavix

, Lipitor and set her up for an echocardiogram and catheter in the morning.





Impression





 Primary Impression:  


 NSTEMI (non-ST elevated myocardial infarction)


 Additional Impression:  


 Syncope


 Qualified Codes:  R55 - Syncope and collapse


Disposition:  09 ADMITTED AS INPATIENT


Condition:  Stable





Admissions


Decision to Admit Reason:  Admit from ER (General)


Decision to Admit/Date:  Jul 22, 2018


Time/Decision to Admit Time:  16:56





Departure-Patient Inst.


Referrals:  


DASHAWN RAI DO (PCP/Family)


Primary Care Physician











JOVAN BERGER Jul 22, 2018 15:57

## 2018-07-22 NOTE — XMS REPORT
Clinical Summary

 Created on: 2018



Jigna Herring

External Reference #: [id number]

: 1931

Sex: Female



Demographics







 Address  [address] [place]  [address]

 

 Home Phone  [phone]

 

 Preferred Language  English

 

 Marital Status  Unknown

 

 Anabaptist Affiliation  MET

 

 Race  White

 

 Ethnic Group  Not  or 





Author







 Author  [facility]

 

 Organization  [facility]

 

 Address  Unknown

 

 Phone  Unavailable







Support







 Name  Relationship  Address  Phone

 

 Jessie Guerra  ECON  Unknown  [phone]

 

 Jenny Guerra  ECON  Unknown  [phone]







Care Team Providers







 Care Team Member Name  Role  Phone

 

 Cody Rai  PCP  Unavailable







Source Comments

Some departments are not documenting in the electronic medical record.  If you 
do not see the information that you expected, contact Release of Information in 
the Health Information Management department at [phone] for further 
assistance in locating additional records.[facility]



Allergies







    



  Active Allergy   Reactions   Severity   Noted Date   Comments

 

    



  Seasonal Allergies   RHINITIS   Low   10/13/2016 

 

    



  Sulfa (Sulfonamide   UNKNOWN   Low   10/13/2016 



  Antibiotics)    







Current Medications







      



  Prescription   Sig.   Disp.   Refills   Start   End Date   Status



      Date  

 

      



  levothyroxine (SYNTHROID)   Take 88 mcg by mouth       Active



  88 mcg tablet   daily 30 minutes before     



   breakfast.     

 

      



  omeprazole DR(+)   Take 40 mg by mouth daily       Active



  (PRILOSEC) 40 mg capsule   before breakfast.     

 

      



  amLODIPine (NORVASC) 5 mg   Take 5 mg by mouth every       Active



  tablet   morning.     

 

      



  ALPRAZolam (XANAX) 0.25   Take 0.25 mg by mouth at       Active



  mg tablet   bedtime daily.     

 

      



  cholecalciferol (VITAMIN   Take 1,000 Units by mouth       Active



  D-3) 1,000 units tablet   daily.     

 

      



  cyanocobalamin (VITAMIN   Inject 1 mL into the       Active



  B-12, RUBRAMIN) 1,000   muscle every 30 days.     



  mcg/mL injection      

 

      



  fish oil /omega-3 fatty   Take 1 Cap by mouth       Active



  acids (SEA-OMEGA)   daily.     



  340/1000 mg capsule      

 

      



  atenolol (TENORMIN) 25 mg   Take 25 mg by mouth every       Active



  tablet   morning.     

 

      



  guaiFENesin LA (MUCINEX)   Take 600 mg by mouth       Active



  600 mg tablet   daily.     

 

      



  ibuprofen (ADVIL) 200 mg   Take 200 mg by mouth       Active



  tablet   every 6 hours as needed     



   for Pain. Take with food.     

 

      



  fluticasone (FLONASE) 50   Apply 1 Spray to each       Active



  mcg/actuation nasal spray   nostril as directed daily     



   as needed. Shake bottle     



   gently before using.     

 

      



  albuterol 0.5%   Inhale 2.5 mg solution by       Active



  (PROVENTIL; VENTOLIN) 2.5   nebulizer as directed     



  mg/0.5 mL nebulizer   every 6 hours as needed     



  solution   for Shortness of Breath     



   or Wheezing.     

 

      



  budesonide/formoterol   Inhale 1-2 Puffs by mouth       Active



  (SYMBICORT HFA) 160/4.5   into the lungs twice     



  mcg inhalation   daily.     

 

      



  meloxicam (MOBIC) 7.5 mg   Take 7.5 mg by mouth       Active



  tablet   daily as needed for Pain.     

 

      



  traMADol (ULTRAM) 50 mg   Take 1 Tab by mouth every   30 Tab   0   20  
  Active



  tablet   6 hours as needed for     16  



   Pain.     

 

      



  hyoscyamine (ANASPAZ;   Place 1 Tab under tongue   30 Tab   3   20    
Active



  NULEV; SYMAX FASTABS;   every 4 hours as needed.     16  



  HYOMAX-FT; ED-SPAZ;      



  OSCIMIN) 0.125 mg rapid      



  dissolve tablet      







Active Problems







 



  Problem   Noted Date

 

 



  Panlobular emphysema (HCC)   2016

 

 



  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

 

 



  Urothelial cancer ([facility])   10/13/2016

 

 



  Overview:



  *Patient was taken for cystoscopy and biopsy 16, with pathology



  revealed:



  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



  - No invasion of the observed subpithelial connective tissue is detected



  *Cytology at the time of biopsy also positive for urothelial cell



  carcinoma.





  16 OPERATIVE PROCEDURE: Right robotic-assisted laparoscopic



  nephroureterectomy with bladder cuff





  Pathology:



  A. Kidney and ureter, "right kidney and ureter", nephroureterectomy:



  Kidney: Noninvasive low grade papillary urothelial carcinoma, Greatest



  dimension: 2.8 cm



  Pathologic Staging (pTNM): pTa Nx Mx





  Last Assessment & Plan:



  Needs cystoscopy in 3 months ~ pt prefers to do closer to home, she will



  call to arrange with Dr Collins



  Will need routine FU cystoscopy every 3 months





  Copy of pathology report given to pt





  Okay to return to work when she is ready, no restrictions





  Discussed increased risks of recurrence with smoking, advised smoking



  cessation







Family History







   



  Medical History   Relation   Name   Comments

 

   



  Cancer   Other  









   



  Relation   Name   Status   Comments

 

   



  Other   







Social History







    



  Tobacco Use   Types   Packs/Day   Years Used   Date

 

    



  Current Every Day Smoker   Cigarettes   1   65 

 

    



  Smokeless Tobacco: Never   



  Used   









 Comments: down to 3/4 pack/day at present









   



  Alcohol Use   Drinks/Week   oz/Week   Comments

 

   



  No   









 



  Sex Assigned at Birth   Date Recorded

 

 



  Not on file 







Last Filed Vital Signs







  



  Vital Sign   Reading   Time Taken

 

  



  Blood Pressure   155/72   2017  1:02 PM CST

 

  



  Pulse   67   2017  1:02 PM CST

 

  



  Temperature   36.7 C (98 F)   2016  8:32 AM CST

 

  



  Respiratory Rate   -   -

 

  



  Oxygen Saturation   92%   2016  9:26 AM CST

 

  



  Inhaled Oxygen   -   -



  Concentration  

 

  



  Weight   45.5 kg (100 lb 6.4 oz)   2017  1:02 PM CST

 

  



  Height   154.9 cm (5' 1")   2017  1:02 PM CST

 

  



  Body Mass Index   18.97   2017  1:02 PM CST







Plan of Treatment







   



  Health Maintenance   Due Date   Last Done   Comments

 

   



  PHYSICAL (COMPREHENSIVE)   1938  



  EXAM   

 

   



  PERTUSSIS VACCINE   1942  

 

   



  TETANUS VACCINE   1948  

 

   



  SHINGLES RECOMBINANT   1981  



  VACCINE (1 of 2)   

 

   



  OSTEOPOROSIS SCREENING   1996  

 

   



  PNEUMONIA (PCV13/PPSV23)   1996  



  VACCINES (1 of 2 - PCV13)   

 

   



  INFLUENZA VACCINE   10/01/2018  







Results

Not on filefrom Last 3 Months

## 2018-07-22 NOTE — DIAGNOSTIC IMAGING REPORT
INDICATION: Syncope.



COMPARISON: 11/12/2017.



EXAMINATION: Single view of the chest was obtained.



FINDINGS: Segmental atelectasis again noted in the right middle

lobe. The lungs are otherwise well-aerated and clear. There is

chronic interstitial lung disease. There has been development of

rib fractures involving the posterolateral fifth and sixth ribs

on the right. No pneumothorax or pleural effusions. Heart is

mildly enlarged without evidence of congestive failure.



IMPRESSION:

1. Development of rib fractures in the right posterior lateral

fifth and sixth ribs since previous exam. These were present on

previous CT scan, however, of 1/19/2018.

2. Persistent atelectasis in the right middle lobe.

3. Obstructive interstitial lung disease, unchanged. 



Dictated by: 



  Dictated on workstation # [id number]

## 2018-07-22 NOTE — DIAGNOSTIC IMAGING REPORT
PROCEDURE: CT head without contrast.



TECHNIQUE: Multiple contiguous axial images were obtained through

the brain without the use of intravenous contrast.



INDICATION: Syncope after hyperventilating.



FINDINGS: There is marked cortical atrophy. Diffuse

periventricular white matter changes are seen. Ventricles are not

dilated. There is no intracranial hemorrhage or mass. Basal

cisterns are clear. No evidence of shift of midline. Mastoid air

cells are clear. No evidence of calvarial fracture.



IMPRESSION: Marked cortical atrophy with white matter changes

consistent with chronic small vessel disease. No acute

abnormalities demonstrated.



Dictated by: 



  Dictated on workstation # [id number]

## 2018-07-23 VITALS — SYSTOLIC BLOOD PRESSURE: 157 MMHG | DIASTOLIC BLOOD PRESSURE: 80 MMHG

## 2018-07-23 VITALS — DIASTOLIC BLOOD PRESSURE: 65 MMHG | SYSTOLIC BLOOD PRESSURE: 187 MMHG

## 2018-07-23 VITALS — DIASTOLIC BLOOD PRESSURE: 69 MMHG | SYSTOLIC BLOOD PRESSURE: 148 MMHG

## 2018-07-23 VITALS — SYSTOLIC BLOOD PRESSURE: 186 MMHG | DIASTOLIC BLOOD PRESSURE: 65 MMHG

## 2018-07-23 VITALS — SYSTOLIC BLOOD PRESSURE: 197 MMHG | DIASTOLIC BLOOD PRESSURE: 71 MMHG

## 2018-07-23 VITALS — SYSTOLIC BLOOD PRESSURE: 154 MMHG | DIASTOLIC BLOOD PRESSURE: 83 MMHG

## 2018-07-23 VITALS — SYSTOLIC BLOOD PRESSURE: 156 MMHG | DIASTOLIC BLOOD PRESSURE: 72 MMHG

## 2018-07-23 VITALS — DIASTOLIC BLOOD PRESSURE: 90 MMHG | SYSTOLIC BLOOD PRESSURE: 167 MMHG

## 2018-07-23 VITALS — SYSTOLIC BLOOD PRESSURE: 160 MMHG | DIASTOLIC BLOOD PRESSURE: 133 MMHG

## 2018-07-23 VITALS — DIASTOLIC BLOOD PRESSURE: 75 MMHG | SYSTOLIC BLOOD PRESSURE: 118 MMHG

## 2018-07-23 VITALS — SYSTOLIC BLOOD PRESSURE: 171 MMHG | DIASTOLIC BLOOD PRESSURE: 78 MMHG

## 2018-07-23 VITALS — SYSTOLIC BLOOD PRESSURE: 122 MMHG | DIASTOLIC BLOOD PRESSURE: 58 MMHG

## 2018-07-23 VITALS — DIASTOLIC BLOOD PRESSURE: 84 MMHG | SYSTOLIC BLOOD PRESSURE: 175 MMHG

## 2018-07-23 VITALS — SYSTOLIC BLOOD PRESSURE: 174 MMHG | DIASTOLIC BLOOD PRESSURE: 61 MMHG

## 2018-07-23 VITALS — DIASTOLIC BLOOD PRESSURE: 69 MMHG | SYSTOLIC BLOOD PRESSURE: 191 MMHG

## 2018-07-23 VITALS — DIASTOLIC BLOOD PRESSURE: 77 MMHG | SYSTOLIC BLOOD PRESSURE: 170 MMHG

## 2018-07-23 VITALS — DIASTOLIC BLOOD PRESSURE: 77 MMHG | SYSTOLIC BLOOD PRESSURE: 158 MMHG

## 2018-07-23 VITALS — SYSTOLIC BLOOD PRESSURE: 168 MMHG | DIASTOLIC BLOOD PRESSURE: 84 MMHG

## 2018-07-23 VITALS — SYSTOLIC BLOOD PRESSURE: 155 MMHG | DIASTOLIC BLOOD PRESSURE: 60 MMHG

## 2018-07-23 VITALS — SYSTOLIC BLOOD PRESSURE: 181 MMHG | DIASTOLIC BLOOD PRESSURE: 64 MMHG

## 2018-07-23 VITALS — DIASTOLIC BLOOD PRESSURE: 103 MMHG | SYSTOLIC BLOOD PRESSURE: 178 MMHG

## 2018-07-23 VITALS — SYSTOLIC BLOOD PRESSURE: 181 MMHG | DIASTOLIC BLOOD PRESSURE: 63 MMHG

## 2018-07-23 VITALS — DIASTOLIC BLOOD PRESSURE: 74 MMHG | SYSTOLIC BLOOD PRESSURE: 200 MMHG

## 2018-07-23 VITALS — DIASTOLIC BLOOD PRESSURE: 74 MMHG | SYSTOLIC BLOOD PRESSURE: 154 MMHG

## 2018-07-23 VITALS — SYSTOLIC BLOOD PRESSURE: 147 MMHG | DIASTOLIC BLOOD PRESSURE: 73 MMHG

## 2018-07-23 VITALS — DIASTOLIC BLOOD PRESSURE: 88 MMHG | SYSTOLIC BLOOD PRESSURE: 180 MMHG

## 2018-07-23 VITALS — DIASTOLIC BLOOD PRESSURE: 70 MMHG | SYSTOLIC BLOOD PRESSURE: 191 MMHG

## 2018-07-23 VITALS — DIASTOLIC BLOOD PRESSURE: 66 MMHG | SYSTOLIC BLOOD PRESSURE: 185 MMHG

## 2018-07-23 VITALS — DIASTOLIC BLOOD PRESSURE: 63 MMHG | SYSTOLIC BLOOD PRESSURE: 128 MMHG

## 2018-07-23 VITALS — SYSTOLIC BLOOD PRESSURE: 161 MMHG | DIASTOLIC BLOOD PRESSURE: 79 MMHG

## 2018-07-23 VITALS — DIASTOLIC BLOOD PRESSURE: 65 MMHG | SYSTOLIC BLOOD PRESSURE: 147 MMHG

## 2018-07-23 VITALS — SYSTOLIC BLOOD PRESSURE: 162 MMHG | DIASTOLIC BLOOD PRESSURE: 77 MMHG

## 2018-07-23 VITALS — SYSTOLIC BLOOD PRESSURE: 164 MMHG | DIASTOLIC BLOOD PRESSURE: 83 MMHG

## 2018-07-23 VITALS — SYSTOLIC BLOOD PRESSURE: 140 MMHG | DIASTOLIC BLOOD PRESSURE: 57 MMHG

## 2018-07-23 VITALS — DIASTOLIC BLOOD PRESSURE: 67 MMHG | SYSTOLIC BLOOD PRESSURE: 124 MMHG

## 2018-07-23 VITALS — DIASTOLIC BLOOD PRESSURE: 74 MMHG | SYSTOLIC BLOOD PRESSURE: 167 MMHG

## 2018-07-23 VITALS — SYSTOLIC BLOOD PRESSURE: 196 MMHG | DIASTOLIC BLOOD PRESSURE: 74 MMHG

## 2018-07-23 VITALS — DIASTOLIC BLOOD PRESSURE: 75 MMHG | SYSTOLIC BLOOD PRESSURE: 137 MMHG

## 2018-07-23 LAB
BASOPHILS # BLD AUTO: 0 10^3/UL (ref 0–0.1)
BASOPHILS NFR BLD AUTO: 0 % (ref 0–10)
BUN/CREAT SERPL: 19
CALCIUM SERPL-MCNC: 8.8 MG/DL (ref 8.5–10.1)
CARDIAC PROFILE 2: 0.8 NG/ML (ref ?–0.3)
CHLORIDE SERPL-SCNC: 108 MMOL/L (ref 98–107)
CHOLEST SERPL-MCNC: 144 MG/DL (ref ?–200)
CO2 SERPL-SCNC: 25 MMOL/L (ref 21–32)
CREAT SERPL-MCNC: 0.91 MG/DL (ref 0.6–1.3)
EOSINOPHIL # BLD AUTO: 0.1 10^3/UL (ref 0–0.3)
EOSINOPHIL NFR BLD AUTO: 1 % (ref 0–10)
ERYTHROCYTE [DISTWIDTH] IN BLOOD BY AUTOMATED COUNT: 16.5 % (ref 10–14.5)
GFR SERPLBLD BASED ON 1.73 SQ M-ARVRAT: 59 ML/MIN
GLUCOSE SERPL-MCNC: 91 MG/DL (ref 70–105)
HCT VFR BLD CALC: 32 % (ref 35–52)
HDLC SERPL-MCNC: 66 MG/DL (ref 40–60)
HGB BLD-MCNC: 10 G/DL (ref 11.5–16)
LYMPHOCYTES # BLD AUTO: 1.9 X 10^3 (ref 1–4)
LYMPHOCYTES NFR BLD AUTO: 20 % (ref 12–44)
MANUAL DIFFERENTIAL PERFORMED BLD QL: NO
MCH RBC QN AUTO: 26 PG (ref 25–34)
MCHC RBC AUTO-ENTMCNC: 31 G/DL (ref 32–36)
MCV RBC AUTO: 83 FL (ref 80–99)
MONOCYTES # BLD AUTO: 0.7 X 10^3 (ref 0–1)
MONOCYTES NFR BLD AUTO: 8 % (ref 0–12)
NEUTROPHILS # BLD AUTO: 6.5 X 10^3 (ref 1.8–7.8)
NEUTROPHILS NFR BLD AUTO: 71 % (ref 42–75)
PLATELET # BLD: 203 10^3/UL (ref 130–400)
PMV BLD AUTO: 11.7 FL (ref 7.4–10.4)
POTASSIUM SERPL-SCNC: 4.2 MMOL/L (ref 3.6–5)
RBC # BLD AUTO: 3.86 10^6/UL (ref 4.35–5.85)
SODIUM SERPL-SCNC: 142 MMOL/L (ref 135–145)
TRIGL SERPL-MCNC: 60 MG/DL (ref ?–150)
VLDLC SERPL CALC-MCNC: 12 MG/DL (ref 5–40)
WBC # BLD AUTO: 9.2 10^3/UL (ref 4.3–11)

## 2018-07-23 PROCEDURE — 027034Z DILATION OF CORONARY ARTERY, ONE ARTERY WITH DRUG-ELUTING INTRALUMINAL DEVICE, PERCUTANEOUS APPROACH: ICD-10-PCS | Performed by: INTERNAL MEDICINE

## 2018-07-23 PROCEDURE — B3101ZZ FLUOROSCOPY OF THORACIC AORTA USING LOW OSMOLAR CONTRAST: ICD-10-PCS | Performed by: INTERNAL MEDICINE

## 2018-07-23 PROCEDURE — B2111ZZ FLUOROSCOPY OF MULTIPLE CORONARY ARTERIES USING LOW OSMOLAR CONTRAST: ICD-10-PCS | Performed by: INTERNAL MEDICINE

## 2018-07-23 PROCEDURE — 4A023N7 MEASUREMENT OF CARDIAC SAMPLING AND PRESSURE, LEFT HEART, PERCUTANEOUS APPROACH: ICD-10-PCS | Performed by: INTERNAL MEDICINE

## 2018-07-23 PROCEDURE — B2151ZZ FLUOROSCOPY OF LEFT HEART USING LOW OSMOLAR CONTRAST: ICD-10-PCS | Performed by: INTERNAL MEDICINE

## 2018-07-23 RX ADMIN — NICOTINE SCH MG: 7 PATCH, EXTENDED RELEASE TRANSDERMAL at 15:34

## 2018-07-23 RX ADMIN — ENOXAPARIN SODIUM SCH MG: 100 INJECTION SUBCUTANEOUS at 07:58

## 2018-07-23 RX ADMIN — RISPERIDONE SCH MG: 0.25 TABLET, FILM COATED ORAL at 09:50

## 2018-07-23 RX ADMIN — RIVAROXABAN SCH MG: 15 TABLET, FILM COATED ORAL at 21:36

## 2018-07-23 RX ADMIN — MORPHINE SULFATE PRN MG: 4 INJECTION, SOLUTION INTRAMUSCULAR; INTRAVENOUS at 01:09

## 2018-07-23 RX ADMIN — IPRATROPIUM BROMIDE AND ALBUTEROL SULFATE SCH ML: .5; 3 SOLUTION RESPIRATORY (INHALATION) at 10:25

## 2018-07-23 RX ADMIN — ATORVASTATIN CALCIUM SCH MG: 20 TABLET, FILM COATED ORAL at 21:36

## 2018-07-23 RX ADMIN — IPRATROPIUM BROMIDE AND ALBUTEROL SULFATE SCH ML: .5; 3 SOLUTION RESPIRATORY (INHALATION) at 15:19

## 2018-07-23 RX ADMIN — LISINOPRIL SCH MG: 5 TABLET ORAL at 07:51

## 2018-07-23 RX ADMIN — CLOPIDOGREL BISULFATE SCH MG: 75 TABLET, FILM COATED ORAL at 08:00

## 2018-07-23 RX ADMIN — LEVOTHYROXINE SODIUM SCH MCG: 0.09 TABLET ORAL at 06:23

## 2018-07-23 RX ADMIN — ALBUTEROL SULFATE PRN MG: 2.5 SOLUTION RESPIRATORY (INHALATION) at 11:27

## 2018-07-23 RX ADMIN — IPRATROPIUM BROMIDE AND ALBUTEROL SULFATE SCH ML: .5; 3 SOLUTION RESPIRATORY (INHALATION) at 19:36

## 2018-07-23 RX ADMIN — MONTELUKAST SCH MG: 10 TABLET, FILM COATED ORAL at 07:50

## 2018-07-23 RX ADMIN — SODIUM CHLORIDE SCH MLS/HR: 900 INJECTION, SOLUTION INTRAVENOUS at 16:12

## 2018-07-23 RX ADMIN — ALBUTEROL SULFATE PRN MG: 2.5 SOLUTION RESPIRATORY (INHALATION) at 08:02

## 2018-07-23 RX ADMIN — METOPROLOL TARTRATE SCH MG: 50 TABLET, FILM COATED ORAL at 07:51

## 2018-07-23 RX ADMIN — DILTIAZEM HYDROCHLORIDE SCH MG: 180 CAPSULE, COATED, EXTENDED RELEASE ORAL at 07:50

## 2018-07-23 RX ADMIN — RISPERIDONE SCH MG: 0.25 TABLET, FILM COATED ORAL at 21:36

## 2018-07-23 RX ADMIN — MORPHINE SULFATE PRN MG: 4 INJECTION, SOLUTION INTRAMUSCULAR; INTRAVENOUS at 22:31

## 2018-07-23 RX ADMIN — ASPIRIN SCH MG: 81 TABLET ORAL at 07:59

## 2018-07-23 RX ADMIN — MORPHINE SULFATE PRN MG: 4 INJECTION, SOLUTION INTRAMUSCULAR; INTRAVENOUS at 03:14

## 2018-07-23 RX ADMIN — ALPRAZOLAM SCH MG: 0.25 TABLET ORAL at 18:21

## 2018-07-23 RX ADMIN — Medication SCH MG: at 07:50

## 2018-07-23 RX ADMIN — METOPROLOL TARTRATE SCH MG: 50 TABLET, FILM COATED ORAL at 21:36

## 2018-07-23 NOTE — CARDIAC PROCEDURE NOTE-CS/ASA
Pre-Procedure Note


Pre-Op Procedure Note


H&P Reviewed


The H&P was reviewed, patient examined and no changes noted.


Date H&P Reviewed:  Jul 23, 2018


Time H&P Reviewed:  12:35





Conscious Sedation Pre-Proced


Time Reviewed:  12:36


ASA Class:  3











Airway Mallampati Classification: ([place] appropriate class) I.  II.  III,  IV


 


Lungs 


 


Heart 


 


 ASA score


 


 ASA 1: a normal healthy patient


 


 ASA 2:  a patient with a mild systemic disease (mid diabetes, controlled 

hypertension, obesity 


 


 ASA 3:  a patient with a severe systemic disease that limits activity  (angina

, COPD, prior Myocardial infarction)


 


 ASA 4:  a patient with an incapacitating disease that is a constant threat to 

life (CHF, renal failure)


 


 ASA 5:  a moribund patient not expected to survive 24 hrs.  (ruptured aneurysm)


 


 ASA 6:  a declared brain dead patient whose organs are being harvested.


 


 For emergent operations, add the letter E after the classification








Grade 1


Sedation Plan:  Analgesia, Amnesia, Plan communicated to team members, 

Discussed options with patient/fam, Discussed risks with patient/fam


Note


The patient is an appropriate candidate to undergo the planned procedure, 

sedation, and anesthesia.





The patient immediately re-assessed prior to indication.











MARITZA NICOLE MD Jul 23, 2018 12:36 pm

## 2018-07-23 NOTE — PULMONARY CONSULTATION
History of Present Illness


History of Present Illness


Date of Consultation


7/23/18


 08:51


Time Seen by Provider:  08:51


Date of Admission





History of Present Illness


87yo with hx of COPD presented to ED after developing nausea, back pain, 

abdominal pain and vomiting. She was recently treated by PCP on 7/12 with cough 

and SOB. She was placed on a prednisone taper however her cough continued. 

While in the ED she was admitted with Afib RVR.





Allergies and Home Medications


Allergies


Coded Allergies:  


     Sulfa (Sulfonamide Antibiotics) (Unverified  Allergy, Unknown, 7/23/18)





Home Medications


Acetaminophen 325 Mg Tablet, 325-650 MG PO Q4H PRN for PAIN-MILD, (Reported)


Albuterol Sulfate 2.5 Mg/3 Ml Vial.neb, 2.5 MG NEB Q6H PRN for WHEEZING, (

Reported)


Alprazolam 0.25 Mg Tablet, 0.25 MG PO HS, (Reported)


Benzonatate 100 Mg Capsule, 100 MG PO Q6H PRN for COUGH, (Reported)


Cyanocobalamin 1,000 Mcg/Ml Inj, 1,000 MCG INJ MONTHLY, (Reported)


Fluticasone/Vilanterol 1 Each Blst.w.dev, 1 PUFF IH DAILY, (Reported)


Ibuprofen 200 Mg Tablet, 200 MG PO Q6H PRN for PAIN-MILD, (Reported)


Levocetirizine Dihydrochloride 5 Mg Tablet, 5 MG PO DAILY PRN for ALLERGIES, (

Reported)


Levothyroxine Sodium 88 Mcg Tablet, 88 MCG PO DAILY, (Reported)


Magnesium Oxide 400 Mg Tablet, 400 MG PO DAILY, (Reported)


Metoclopramide HCl 5 Mg Tablet, 5 MG PO BIDAC, (Reported)


   FILLED #60 6-14-18 


Montelukast Sodium 10 Mg Tablet, 10 MG PO DAILY, (Reported)


Omeprazole 40 Mg Capsule.dr, 40 MG PO DAILY PRN for HEARTBURN, (Reported)


Rivaroxaban 15 Mg Tablet, 15 MG PO DAILY, (Reported)


Umeclidinium Bromide 62.5 Mcg Blst.w.dev, 1 PUFF IH DAILY, (Reported)





Past Medical-Social-Family Hx


Patient Social History


Alcohol Use:  Denies Use


Recreational Drug Use:  No


Smoking Status:  Former Smoker


Type Used:  Cigarettes


Former Smoker, Quit:  Oct 5, 2017


2nd Hand Smoke Exposure:  Yes


Recent Foreign Travel:  No


Contact w/Someone Who Travel:  No


Recent Infectious Disease Expo:  No


Recent Hopitalizations:  Yes





Immunizations Up To Date


Tetanus Booster (TDap):  Unknown


PED Vaccines UTD:  Yes


Date of Pneumonia Vaccine:  Nov 1, 2016


Date of Influenza Vaccine:  Oct 24, 2017





Seasonal Allergies


Seasonal Allergies:  No





Past Medical History


Surgeries:  Yes (LEFT HIP,HYST.,THYROIDECTOMY 95%, cataract)


Hysterectomy, Lumpectomy, Orthopedic, Renal


Respiratory:  Yes (COPD)


Pneumonia, COPD, Emphysema


Currently Using CPAP:  No


Currently Using BIPAP:  No


Cardiac:  Yes


Atrial Fibrillation, Hypertension


Neurological:  No


Reproductive Disorders:  No


Female Reproductive Disorders:  Denies


Sexually Transmitted Disease:  No


HIV/AIDS:  No


Genitourinary:  Yes (KIDNEY CANCER AND REMOVAL)


Gastrointestinal:  No


Musculoskeletal:  Yes


Osteoporosis, Chronic Back Pain, Fractures


Endocrine:  Yes


Hypothyroidsim


HEENT:  Yes


Cataract


Loss of Vision:  Denies


Hearing Impairment:  Hard of Hearing


Cancer:  Yes


Kidney


Did You Recieve Any Treatments:  Yes


What Type of Treatment Did You:  Surgical Intervention


Psychosocial:  No


Integumentary:  Yes (bruises easily)


Blood Disorders:  No


Adverse Reaction/Blood Tranf:  No





Family Medical History





Alcoholism


  19 FATHER


Arthritis


  19 MOTHER


Diabetes mellitus


  19 MOTHER


Hypertension


  19 MOTHER


Neoplasm (breast cancer)


  19 MOTHER


Osteoporosis


  19 MOTHER





Review of Systems


Time Seen by Provider:  07:16


Constitutional:  Sweats, Weakness, Malaise; No: Fever, Chills, Other


Eyes:  No: Pain, Vision change, Conjunctivae inflammation, Eyelid inflammation, 

Other, Redness


ENT:  Nose discharge, Nose congestion; No: Ear pain, Ear discharge, Nose pain, 

Mouth pain, Mouth swelling, Throat pain, Throat swelling, Other


Respiratory:  Cough, Dry, Shortness of breath, SOB with excertion, Wheezing; No

: Hemoptysis, Pleuritic Pain


Cardiovascular:  Chest Pain, Palpitations, Paroxysmal Noc. Dyspnea, Lt 

Headedness


Gastrointestinal:  No: Nausea, Vomiting, Abdominal Pain, Diarrhea, Constipation

, Melena, Hematochezia, Other


Neurological:  Weakness, Incoordination, Confusion





Sepsis Event


Evaluation


Height, Weight, BMI


Height: 5'1.00"


Weight: 108lbs. 6.0oz. 48.582205np; 20.7 BMI


Method:Stated





Exam


Exam





Vital Signs








  Date Time  Temp Pulse Resp B/P (MAP) Pulse Ox O2 Delivery O2 Flow Rate FiO2


 


7/23/18 08:02     94 Nasal Cannula 3.00 


 


7/23/18 08:00 98.8 65 38 154/74 (100) 96 Nasal Cannula 3.00 


 


7/23/18 08:00     96 Nasal Cannula 3.00 


 


7/23/18 07:00  73      


 


7/23/18 06:00  67 31 178/103 (128) 94 Nasal Cannula 3.00 


 


7/23/18 05:00  64 13 171/78 (109) 95 Nasal Cannula 3.00 


 


7/23/18 04:00 97.9 64 31 180/88 (118) 94 Nasal Cannula 3.00 


 


7/23/18 04:00     96 Nasal Cannula 3.00 


 


7/23/18 02:00  63 21 156/72 (100) 95 Nasal Cannula 3.00 


 


7/23/18 01:00  66      


 


7/23/18 01:00  62 23 162/77 (105) 94 Nasal Cannula 3.00 


 


7/23/18 00:00 98.3 64 24 140/57 (84) 96 Nasal Cannula 3.00 


 


7/23/18 00:00     95 Nasal Cannula 3.00 


 


7/22/18 23:00  66 23 169/78 (108) 96 Nasal Cannula 3.00 


 


7/22/18 22:00  67 10 182/99 (126) 98 Nasal Cannula 3.00 


 


7/22/18 21:00     97 Nasal Cannula 3.00 


 


7/22/18 21:00  73 15 153/95 (114) 97 Nasal Cannula 3.00 


 


7/22/18 20:59     97 Nasal Cannula 3.00 


 


7/22/18 20:30 98.3       


 


7/22/18 20:00     95 Nasal Cannula 3.00 


 


7/22/18 20:00 98.3 80 26 174/90 (118) 95 Nasal Cannula 3.00 


 


7/22/18 19:00  89      


 


7/22/18 19:00  89 20 185/94 (124) 95 Nasal Cannula 3.00 


 


7/22/18 19:00  89 27 185/94 (124) 98 Nasal Cannula 3.00 


 


7/22/18 18:45  90 24 194/93 (126) 95 Nasal Cannula 3.00 


 


7/22/18 18:42       3.00 


 


7/22/18 18:30  75 24 167/92 (117) 97 Nasal Cannula 3.00 


 


7/22/18 18:15  78 20 173/88 (116) 93 Nasal Cannula 3.00 


 


7/22/18 18:00  75 18 148/104 (119) 93 Nasal Cannula 3.00 


 


7/22/18 17:46  83      


 


7/22/18 17:45  83 35 144/87 (106) 93 Nasal Cannula 3.00 


 


7/22/18 17:45      Nasal Cannula 3.00 


 


7/22/18 17:18  88 18 158/66 96 Nasal Cannula 3.00 


 


7/22/18 15:23 98.0 88 18 172/74 (106) 8 Room Air  


 


7/22/18 15:23      Nasal Cannula 3.00 














I & O 


 


 7/23/18





 07:00


 


Intake Total 400 ml


 


Output Total 401 ml


 


Balance -1 ml








Height & Weight


Height: 5'1.00"


Weight: 108lbs. 6.0oz. 48.906031bd; 20.7 BMI


Method:Stated


General Appearance:  No Apparent Distress, Chronically ill


HEENT:  PERRL/EOMI, Normal ENT Inspection, Pharynx Normal


Neck:  Full Range of Motion, Normal Inspection, Non Tender, Supple


Respiratory:  Lungs Clear, No Respiratory Distress


Cardiovascular:  Regular Rate, Rhythm, No Murmur


Capillary Refill:  Less Than 3 Seconds


Extremity:  Normal Capillary Refill, Normal Inspection, No Pedal Edema


Neurologic/Psychiatric:  Alert, Oriented x3, Normal Mood/Affect


Skin:  Normal Color, Warm/Dry





Results


Lab


Laboratory Tests


7/22/18 15:33








7/23/18 03:00











Assessment/Plan


Assessment/Plan


NSTEMI 


   -Cardiology is following    


   -ASA, plavix, lovenox


oxygen dependent COPD with emphysema 


Afib 


   -Diltiazem, metoprolol Xarelto


Atelectasis 


   -Increase activity, IS 


Old fracture ribs - noted on ct 1/19/18











TIO TORIBIO DO Jul 23, 2018 08:57

## 2018-07-23 NOTE — HISTORY & PHYSICAL-HOSPITALIST
History of Present Illness


HPI/Chief Complaint


Pt is a 86yoCF known to me from previous admissions who presented to the ER 

with CC of near syncope. She does not recall actually passing out but thinks 

she was very close to LOC. She was at a birthday party for her great-grandson 

and on the drive home started to feel sick to her stomach and developed upper 

abd and back pain. She ultimately vomited twice. She reports about a 1 1/2 week 

history of feeling her stomach has been bloated and having abd pain. She was 

seen by her PCP on  for a cough and SOB and was prescribed an abx and 

prednisone but has continued to cough without any improvement.


Source:  patient


Exam Limitations:  no limitations


Date Seen


18


Time Seen by Provider:  07:30


Attending Physician


Toya Mcfadden MD


PCP


Cody Rai DO


Referring Physician





Date of Admission


2018 at 5:00 pm





Home Medications & Allergies


Home Medications


Reviewed patient Home Medication Reconciliation performed by pharmacy 

medication reconciliations technician and/or nursing.


Patients Allergies have been reviewed.





Allergies





Allergies


Coded Allergies


  Sulfa (Sulfonamide Antibiotics) (Unverified Allergy, Unknown, 18)








Past Medical-Social-Family Hx


Past Med/Social Hx:  Reviewed and Corrections made


Patient Social History


Alcohol Use:  Denies Use


Recreational Drug Use:  No


Smoking Status:  Former Smoker


Former Smoker, Quit:  Oct 5, 2017


Type Used:  Cigarettes


2nd Hand Smoke Exposure:  Yes


Physical Abuse Screen:  No


Sexual Abuse:  No


Recent Foreign Travel:  No


Contact w/other who traveled:  No


Recent Hopitalizations:  Yes


Recent Infectious Disease Expo:  No





Immunizations Up To Date


Tetanus Booster (TDap):  Unknown


Pediatric:  Yes


Date of Pneumonia Vaccine:  2016


Date of Influenza Vaccine:  Oct 24, 2017





Seasonal Allergies


Seasonal Allergies:  No





Past Medical History


Surgeries:  Hysterectomy, Lumpectomy, Orthopedic, Renal


Currently Using CPAP:  No


Currently Using BIPAP:  No


Cardiac:  Atrial Fibrillation, Hypertension


Reproductive:  No


Sexually Transmitted Disease:  No


HIV/AIDS:  No


Female Reproductive Disorders:  Denies


Musculoskeletal:  Osteoporosis, Chronic Back Pain, Fractures


Endocrine:  Hypothyroidsim


HEENT:  Cataract


Loss of Vision:  Denies


Hearing Impairment:  Hard of Hearing


Cancer:  Kidney


Did You Recieve Any Treatments:  Yes


What Type of Treatment Did You:  Surgical Intervention


History of Blood Disorders:  No


Adverse Reaction to Blood Almanza:  No





Family History


Reviewed Nursing Family Hx





Alcoholism


  19 FATHER


Arthritis


  19 MOTHER


Diabetes mellitus


  19 MOTHER


Hypertension


  19 MOTHER


Neoplasm (breast cancer)


  19 MOTHER


Osteoporosis


  19 MOTHER





Review of Systems


Constitutional:  diaphoresis; No weakness


EENTM:  no symptoms reported


Respiratory:  no symptoms reported


Cardiovascular:  see HPI


Gastrointestinal:  abdominal pain; No constipation, No diarrhea; nausea, 

vomiting


Genitourinary:  no symptoms reported


Musculoskeletal:  no symptoms reported


Skin:  no symptoms reported


Psychiatric/Neurological:  No Symptoms Reported





Physical Exam


Physical Exam


Vital Signs





Vital Signs - First Documented








 18





 15:23


 


Temp 98.0


 


Pulse 88


 


Resp 18


 


B/P (MAP) 172/74 (106)


 


Pulse Ox 8


 


O2 Delivery Nasal Cannula


 


O2 Flow Rate 3.00





Capillary Refill : Less Than 3 Seconds


Height, Weight, BMI


Height: 5'1.00"


Weight: 108lbs. 6.0oz. 48.880794il; 20.7 BMI


Method:Stated


General Appearance:  No Apparent Distress, Chronically ill


Respiratory:  Lungs Clear, No Respiratory Distress


Cardiovascular:  Regular Rate, Rhythm, No Murmur


Gastrointestinal:  Normal Bowel Sounds, Non Tender, Soft


Extremity:  Non Tender, No Calf Tenderness, No Pedal Edema; No Swelling


Neurologic/Psychiatric:  Alert, Oriented x3, Normal Mood/Affect


Skin:  Normal Color, Warm/Dry





Results


Results/Procedures


Labs


Laboratory Tests


18 15:33








18 03:00








Patient resulted labs reviewed.


Imaging:  Reviewed Imaging Report





Assessment/Plan


Admission Diagnosis


NSTEMI


Admission Status:  Inpatient Order (span 2 midnights)


Reason for Inpatient Admission:  


NSTEMI





Diagnosis/Problems


Diagnosis/Problems





(1) NSTEMI (non-ST elevated myocardial infarction)


Status:  Acute


Assessment & Plan:  Troponin elevated on arrival


   ASA, Plavix, Lovenox in ER


Symptoms improved


Dr Jordan contacted last night, appreciate recs


Plan for cardiac cath today


   Await results





(2) Emphysema/COPD


Status:  Chronic


Assessment & Plan:  On 2lpm at baseline


Just completed course of abx and prednisone per PCP


Qualifiers:  


   Emphysema type:  unspecified  Qualified Codes:  J43.9 - Emphysema, 

unspecified


(3) Essential (primary) hypertension


Status:  Chronic


Assessment & Plan:  Continue home meds





(4) Hypothyroidism


Status:  Chronic


Assessment & Plan:  Continue home meds


Qualifiers:  


   Hypothyroidism type:  unspecified  Qualified Codes:  E03.9 - Hypothyroidism, 

unspecified


(5) Atrial fibrillation


Assessment & Plan:  Continue Home diltiazem and metoprolol


Continue Xarelto


Qualifiers:  


   Atrial fibrillation type:  paroxysmal  Qualified Codes:  I48.0 - Paroxysmal 

atrial fibrillation


(6) Prophylactic measure


Assessment & Plan:  NPO


Xarelto


Saline lock








Clinical Quality Measures


DVT/VTE Risk/Contraindication:


Risk Factor Score Per Nursin


RFS Level Per Nursing on Admit:  2=Moderate











GERMAN GRADY MD 2018 7:53 am

## 2018-07-23 NOTE — CORONARY ANGIOGRAPHY & PCI
Coronary Angiography & PCI


DATE OF PROCEDURE: 7/23/18 





INDICATION: Non-STEMI.





PREOPERATIVE DIAGNOSIS: Non-STEMI.





POSTOPERATIVE DIAGNOSIS: PCI with drug-eluting stent to ostial RCA.





HISTORY:  This is a 86-year-old lady who has history of atrial fibrillation.  

She presented with chest pain and syncope.  Positive troponins.  EKG did not 

show any significant ST-T wave abnormality.  Working diagnosis was non-ST 

elevation MI.  Therefore, the patient was scheduled for coronary angiography. 





PROCEDURES PERFORMED: 


1.Coronary angiography. 


2.Left heart catheterization. 


3.aortic arch angiography.


4.  FFR to ostial RCA.


5.  PCI to ostial RCA.





COMPLICATIONS: None. 


SPECIMENS: None. 


ESTIMATED BLOOD LOSS: 10 mL


ANESTHESIA: Conscious sedation


ANTICOAGULATION: IV heparin


CONTRAST: 240 mL.


FLUOROSCOPY: 36.7 minutes.


FLOUROSCOPY DOSE: 1000mgy.





PROCEDURE DETAILS: The patient is a 86 female  and was brought to the cath lab 

after informed consent was taken. All the risks and complications were 

explained in detail; this included the risk of bleeding, vascular damage, stroke

, MI and even death. The patient was draped and prepped in the usual sterile 

fashion.  Access was gained in the right femoral artery with a 5 Syriac sheath.

  Right coronary angiography was done with a JR4 catheter, left coronary 

angiography was performed with a JL4 catheter.  Left heart catheterization and 

aortic arch angiogram was performed with a pigtail catheter.





FINDINGS: 


1.Left main: Separate ostium.


2.LAD: Moderate to severe mid disease.  Stenosis severity 60-70 percent.


3.Left circumflex artery: Mild mid disease.


4.RCA: Subtotal ostial RCA stenosis.  Stenosis severity 99 percent.


5.Left heart catheterization: Aortic pressure 158/52 mmHg.  LV pressure 184/8 

mmHg.  LVEDP 28 mmHg.  No gradient across the aortic valve.  Normal LV function 

with no significant wall motion abnormalities.


6.  Aortic arch angiography: No evidence of dissection or aneurysm.  

Significant aortic calcifications are noted.  Patent proximal segments of the 

great arteries including brachiocephalic artery, common carotid artery and 

subclavian artery.





RECOMMENDATIONS:


FFR and PCI of the RCA.





INTERVENTION DETAILS:


IV heparin for anticoagulation.  One ACT was done.  ACT was 237 seconds.  

Patient was on aspirin and Plavix.  The first decided to do an FFR of the mid 

LAD stenosis however due to significant tortuosity we were not able to get a 

wire across and perform an FFR.  This was a stable lesion.  We then took a JR4 

guide catheter with side holes.  The lesion in the ostium of the LAD was 

crossed with a FFR wire.  Baseline FFR was 0.33 therefore adenosine was not 

given.  This is severe and PCI is recommended.  We predilated the lesion with a 

2.0 X12 emerge balloon for 12 slava for 56 seconds.  We then deployed Xience 

Alpine 2.5X 15 mm drug-eluting stent at 16 slava.  Excellent results post PCI.  

Post PCI FFR was 0.92.  RC-3 flow with residual stenosis of 0 percent.  

Manual compression to the groin.





CONCLUSIONS: 


1.  Dual antiplatelet therapy for one month.  then discontinue aspirin and 

continue Plavix and Xarelto for one year.  Then discontinue Plavix and continue 

aspirin and Xarelto for life.


2.  Continue rest of secondary prevention measures.   





AIDAN Jordan MD, FACP, FACC, [facility]


Interventional Cardiology











MARITZA JORDAN MD Jul 23, 2018 2:51 pm

## 2018-07-23 NOTE — CONSULTATION-CARDIOLOGY
HPI-Cardiology


Cardiology Consultation:


Date of Consultation


18


Date of Admission





Attending Physician


Toya Mcfadden MD


Admitting Physician


Cody Rai DO


Consulting Physician


MARITZA JORDAN MD





HPI:


Time Seen by Provider:  12:20


Chief Complaint:


Chest pain, syncope


This is a 86-year-old lady who presented with near syncope.  She had chest pain 

on the left side associated with nausea and vomiting.  Prolonged episode of 

chest pain.  Moderate to severe.  Radiating to the arms.  Mild shortness of 

breath.





Review of Systems-Cardiology


Review of Systems


Constitutional:  As described under HPI; No As described under HPI, No no 

symptoms reported, No chills, No fever, No lightheadedness


Eyes:  No As described under HPI, No no symptoms reported, No blindness, No 

blurred vision, No contact lenses, No drainage, No decreased acuity, No foreign 

body sensation, No pain, No vision change


Ears/Nose/Throat:  No As described under HPI, No no symptoms reported, No 

chronic hearing loss, No ear discharge, No ear pain, No nasal drainage, No 

ulcerations


Respiratory:  No no symptoms reported; As described under HPI; No As described 

under HPI, No cough, No orthopnea, No shortness of breath, No SOB with excertion


Cardiovascular:  No no symptoms reported; As described under HPI; No As 

described under HPI; chest pain; No edema, No irregular heart rate, No 

lightheadedness, No palpitations


Gastrointestinal:  No no symptoms reported, No As described under HPI, No 

abdomen distended, No abdominal pain, No blood streaked bowels, No constipation

, No diarrhea, No nausea, No vomiting; nausea/vomiting/diarrhea; No stool 

coloration changes


Genitourinary:  No As described under HPI, No burning, No dysuria, No discharge

, No frequency, No flank pain, No hematuria, No urgency


Pregnant:  Yes


Pregnant:  No


Musculoskeletal:  No no symptoms reported, No As describe under HPI, No back 

pain, No gout, No joint pain, No joint swelling, No muscle pain, No muscle 

stiffness, No neck pain, No other


Skin:  No no symptoms reported, No As described under HPI, No change in color, 

No change in hair/nails, No dryness, No lesions, No lumps, No rash, No other, 

No skin related problems, No ulcerations, No rash on exposed areas, No 

ulcerations on exposed areas


Psychiatric/Neurological:  No anxiety, No depression, No seizure, No focal 

weakness, No syncope


Hematologic:  No bleeding abnormalities





PMH-Social-Family Hx


Patient Social History


Alcohol Use:  Denies Use


Recreational Drug Use:  No


Smoking Status:  Former Smoker


Type Used:  Cigarettes


2nd Hand Smoke Exposure:  Yes


Recent Foreign Travel:  No


Recent Infectious Disease Expo:  No


Hospitalization with Isolation:  Denies


Physical Abuse Screen:  No


Sexual Abuse:  No





Immunizations Up To Date


Tetanus Booster (TDap):  Unknown


Date of Pneumonia Vaccine:  2016


Date of Influenza Vaccine:  Oct 24, 2017





Past Medical History


PMH


As described under Assessment.





Family Medical History


Family History:  


Alcoholism


  19 FATHER


Arthritis


  19 MOTHER


Diabetes mellitus


  19 MOTHER


Hypertension


  19 MOTHER


Neoplasm (breast cancer)


  19 MOTHER


Osteoporosis


  19 MOTHER





Allergies and Home Medications


Allergies


Coded Allergies:  


     Sulfa (Sulfonamide Antibiotics) (Unverified  Allergy, Unknown, 18)





Home Medications


Acetaminophen 325 Mg Tablet, 325-650 MG PO Q4H PRN for PAIN-MILD, (Reported)


Albuterol Sulfate 2.5 Mg/3 Ml Vial.neb, 2.5 MG NEB Q6H PRN for WHEEZING, (

Reported)


Alprazolam 0.25 Mg Tablet, 0.25 MG PO HS, (Reported)


Benzonatate 100 Mg Capsule, 100 MG PO Q6H PRN for COUGH, (Reported)


Cyanocobalamin 1,000 Mcg/Ml Inj, 1,000 MCG INJ MONTHLY, (Reported)


Fluticasone/Vilanterol 1 Each Blst.w.dev, 1 PUFF IH DAILY, (Reported)


Ibuprofen 200 Mg Tablet, 200 MG PO Q6H PRN for PAIN-MILD, (Reported)


Levocetirizine Dihydrochloride 5 Mg Tablet, 5 MG PO DAILY PRN for ALLERGIES, (

Reported)


Levothyroxine Sodium 88 Mcg Tablet, 88 MCG PO DAILY, (Reported)


Magnesium Oxide 400 Mg Tablet, 400 MG PO DAILY, (Reported)


Metoclopramide HCl 5 Mg Tablet, 5 MG PO BIDAC, (Reported)


   FILLED #60 [id number] 


Montelukast Sodium 10 Mg Tablet, 10 MG PO DAILY, (Reported)


Omeprazole 40 Mg Capsule.dr, 40 MG PO DAILY PRN for HEARTBURN, (Reported)


Rivaroxaban 15 Mg Tablet, 15 MG PO DAILY, (Reported)


Umeclidinium Bromide 62.5 Mcg Blst.w.dev, 1 PUFF IH DAILY, (Reported)





Patient Home Medication List


Home Medication List Reviewed:  Yes





Physical Exam-Cardiology


Physical Exam


Vital Signs/I&O











 18





 04:00 04:00 05:00 06:00


 


Temp  97.9  


 


Pulse  64 64 67


 


Resp  31 13 31


 


B/P (MAP)  180/88 (118) 171/78 (109) 178/103 (128)


 


Pulse Ox 96 94 95 94


 


O2 Delivery Nasal Cannula Nasal Cannula Nasal Cannula Nasal Cannula


 


O2 Flow Rate 3.00 3.00 3.00 3.00


 


    





 18





 07:00 07:00 08:00 08:00


 


Temp    98.8


 


Pulse 73 75  65


 


Resp  24  38


 


B/P (MAP)  160/133 (142)  154/74 (100)


 


Pulse Ox  93 96 96


 


O2 Delivery  Nasal Cannula Nasal Cannula Nasal Cannula


 


O2 Flow Rate  3.00 3.00 3.00


 


    





 18





 08:02 09:00 09:00 10:00


 


Pulse   63 60


 


Resp   14 15


 


B/P (MAP)   161/79 (106) 168/84 (112)


 


Pulse Ox 94 96 92 92


 


O2 Delivery Nasal Cannula Nasal Cannula Nasal Cannula Nasal Cannula


 


O2 Flow Rate 3.00 3.00 3.00 3.00





 18





 10:25 11:00 11:25 11:30


 


Pulse  54  


 


Resp  30  


 


B/P (MAP)  175/84 (114)  


 


Pulse Ox 91 90 92 88


 


O2 Delivery Nasal Cannula Nasal Cannula Nasal Cannula Nasal Cannula


 


O2 Flow Rate 3.00 3.00 4.00 3.00





 18  





 12:00 12:00  


 


Pulse 58   


 


Resp 15   


 


B/P (MAP) 167/74 (105)   


 


Pulse Ox 93 93  


 


O2 Delivery Nasal Cannula Nasal Cannula  


 


O2 Flow Rate 4.00 4.00  














 18





 00:00


 


Intake Total 200 ml


 


Output Total 1 ml


 


Balance 199 ml





Capillary Refill : Less Than 3 Seconds


Constitutional:  appears stated age, AAO x 3; No apparent distress; well-

developed, well-nourished


HEENT:  PERRL; No normal ENT inspection, No TMs normal, No pharynx normal, No 

scleral icterus (R), No scleral icterus (L), No pale conjunctivae (R), No pale 

conjunctivae (L), No photophobia, No TM abnormal (R), No TM abnormal (L), No 

pharyngeal erythema, No tonsillar exudate, No other, No discharge, No EOMI; 

hearing is well preserved; No hard of hearing; oral hygience is good; No 

ulceration, No xanthelasmas are seen


Neck:  No non-tender, No full range of motion, No supple, No normal inspection, 

No carotid bruit, No limited range of motion, No lymphadenopathy (R), No 

lymphadenopathy (L), No tender lateral, No tender midline, No thyromegaly, No 

other; carotid pulses are 2 + bilaterally; No with good upstrokes


Respiratory:  No accessory muscle use, No respiratory distress, No chest tender

, No chest expansion is symmetric; chest is bilaterally symmetric; No lungs 

clear to percussion; lungs clear to auscultation; No crackles, No rhonchi, No 

rales, No stridor, No wheezing, No pleural rub, No other


Cardiovascular:  regular rate-rhythm; No irregularly irregular, No extra beats, 

No parasternal heave is noted, No JVD, No edema, No bradycardia, No tachycardia

, No point of maximal impulse, No cardiac thrills are palpable; S1 and S2; No 

gallop/S3, No gallop/S4, No diastolic murmur, No systolic murmur, No friction 

rub, No click, No other


Gastrointestinal:  No tender, No soft, No round, No distended, No pulsatile mass

, No organomegaly, No guarding, No rebound, No tenderness, No hernia, No mass, 

No audible bowel sounds, No abnormal bowel sounds, No abdominal bruits, No 

spleenomegaly, No other


Rectal:  deferred


Extremities:  No normal range of motion, No non-tender, No normal inspection, 

No pedal edema, No calf tenderness, No normal capillary refill, No pelvis stable

, No calf tenderness, No inflammation, No pedal edema, No slow capillary refill

, No swelling, No other, No abrasion, No clubbing, No cyanosis, No ecchymosis, 

No laceration, No no lower extremity edema bilateral, No significant edema, No 

tenderness, No wound


Neurologic/Psychiatric:  no motor/sensory deficits, alert, normal mood/affect, 

oriented x 3, power is 5/5 both on sides


Skin:  No normal color, No warm/dry, No cyanosis, No cool, No diaphoresis, No 

damp, No ecchymosis, No jaundice, No mottled, No pallor, No rash, No tattoos/

piercings, No ulcerations, No rash on exposed areas, No ulcerations on exposed 

areas, No other





Data Review


Labs


Laboratory Tests


18 15:33: 


White Blood Count 9.3, Red Blood Count 4.01L, Hemoglobin 10.6L, Hematocrit 34L, 

Mean Corpuscular Volume 84, Mean Corpuscular Hemoglobin 26, Mean Corpuscular 

Hemoglobin Concent 32, Red Cell Distribution Width 16.5H, Platelet Count 225, 

Mean Platelet Volume 12.6H, Neutrophils (%) (Auto) 79H, Lymphocytes (%) (Auto) 

14, Monocytes (%) (Auto) 6, Eosinophils (%) (Auto) 1, Basophils (%) (Auto) 0, 

Neutrophils # (Auto) 7.3, Lymphocytes # (Auto) 1.3, Monocytes # (Auto) 0.5, 

Eosinophils # (Auto) 0.1, Basophils # (Auto) 0.0, Sodium Level 140, Potassium 

Level 4.3, Chloride Level 106, Carbon Dioxide Level 22, Anion Gap 12, Blood 

Urea Nitrogen 17, Creatinine 1.12, Estimat Glomerular Filtration Rate 46, BUN/

Creatinine Ratio 15, Glucose Level 232H, Calcium Level 8.7, Magnesium Level 1.9

, Total Bilirubin 0.4, Aspartate Amino Transf (AST/SGOT) 235H, Alanine 

Aminotransferase (ALT/SGPT) 138H, Alkaline Phosphatase 76, Myoglobin 50.3, 

Troponin I 0.83*H, B-Type Natriuretic Peptide 349.9H, Total Protein 6.3L, 

Albumin 3.8, Lipase 24


18 16:31: 


Prothrombin Time 21.7H, INR Comment 1.9H, Activated Partial Thromboplast Time 

38H


18 03:00: 


White Blood Count 9.2, Red Blood Count 3.86L, Hemoglobin 10.0L, Hematocrit 32L, 

Mean Corpuscular Volume 83, Mean Corpuscular Hemoglobin 26, Mean Corpuscular 

Hemoglobin Concent 31L, Red Cell Distribution Width 16.5H, Platelet Count 203, 

Mean Platelet Volume 11.7H, Neutrophils (%) (Auto) 71, Lymphocytes (%) (Auto) 20

, Monocytes (%) (Auto) 8, Eosinophils (%) (Auto) 1, Basophils (%) (Auto) 0, 

Neutrophils # (Auto) 6.5, Lymphocytes # (Auto) 1.9, Monocytes # (Auto) 0.7, 

Eosinophils # (Auto) 0.1, Basophils # (Auto) 0.0, Sodium Level 142, Potassium 

Level 4.2, Chloride Level 108H, Carbon Dioxide Level 25, Anion Gap 9, Blood 

Urea Nitrogen 17, Creatinine 0.91, Estimat Glomerular Filtration Rate 59, BUN/

Creatinine Ratio 19, Glucose Level 91, Calcium Level 8.8, Troponin I 0.80*H, 

Triglycerides Level 60, Cholesterol Level 144, LDL Cholesterol Direct 60, VLDL 

Cholesterol 12, HDL Cholesterol 66H








ECG Impression


ECG


Initial ECG Rhythm:  Normal Sinus


Initial ECG Impression:  Nonspecific Changes





A/P-Cardiology


Assessment/Admission Diagnosis


Non-STEMI,


Paroxysmal atrial fibrillation,


Hypothyroidism,


Anemia





Plan


Non-STEMI, positive troponin.  EKG did not show any ST deviation.  Aspirin, 

Plavix, Lovenox.  Continue metoprolol, lisinopril.  We'll start low-dose 

Lipitor.  Coronary angiography today.


Echocardiogram showed normal LV function with no severe valvular heart disease.





Paroxysmal atrial fibrillation, continue Xarelto and rate controlling agent.





Hypothyroidism, continue levothyroxine.





Anemia, deferred to Dr. Castañeda.








Thank you for your consultation. Please call me if you have any questions.








AIDAN Jordan MD, FACP, FACC, FSCAI, FHRS, CCDS


Interventional Cardiology


Cardiac Electrophysiology


Vascular Medicine and Endovascular Interventions





Clinical Quality Measures


DVT/VTE Risk/Contraindication:


Risk Factor Score Per Nursin


RFS Level Per Nursing on Admit:  2=Moderate











MARITZA JORDAN MD 2018 12:35 pm

## 2018-07-24 VITALS — SYSTOLIC BLOOD PRESSURE: 100 MMHG | DIASTOLIC BLOOD PRESSURE: 42 MMHG

## 2018-07-24 VITALS — SYSTOLIC BLOOD PRESSURE: 156 MMHG | DIASTOLIC BLOOD PRESSURE: 77 MMHG

## 2018-07-24 VITALS — SYSTOLIC BLOOD PRESSURE: 112 MMHG | DIASTOLIC BLOOD PRESSURE: 65 MMHG

## 2018-07-24 VITALS — SYSTOLIC BLOOD PRESSURE: 130 MMHG | DIASTOLIC BLOOD PRESSURE: 68 MMHG

## 2018-07-24 VITALS — SYSTOLIC BLOOD PRESSURE: 120 MMHG | DIASTOLIC BLOOD PRESSURE: 59 MMHG

## 2018-07-24 VITALS — SYSTOLIC BLOOD PRESSURE: 120 MMHG | DIASTOLIC BLOOD PRESSURE: 66 MMHG

## 2018-07-24 VITALS — DIASTOLIC BLOOD PRESSURE: 60 MMHG | SYSTOLIC BLOOD PRESSURE: 136 MMHG

## 2018-07-24 VITALS — SYSTOLIC BLOOD PRESSURE: 171 MMHG | DIASTOLIC BLOOD PRESSURE: 97 MMHG

## 2018-07-24 LAB
BASE EXCESS STD BLDA CALC-SCNC: 1 MMOL/L (ref -2.5–2.5)
BDY SITE: (no result)
BODY TEMPERATURE: 97.6
BUN/CREAT SERPL: 15
CALCIUM SERPL-MCNC: 9.2 MG/DL (ref 8.5–10.1)
CHLORIDE SERPL-SCNC: 104 MMOL/L (ref 98–107)
CO2 BLDA CALC-SCNC: 26.9 MMOL/L (ref 21–31)
CO2 SERPL-SCNC: 24 MMOL/L (ref 21–32)
CREAT SERPL-MCNC: 1.08 MG/DL (ref 0.6–1.3)
ERYTHROCYTE [DISTWIDTH] IN BLOOD BY AUTOMATED COUNT: 16.4 % (ref 10–14.5)
GFR SERPLBLD BASED ON 1.73 SQ M-ARVRAT: 48 ML/MIN
GLUCOSE SERPL-MCNC: 109 MG/DL (ref 70–105)
HCT VFR BLD CALC: 34 % (ref 35–52)
HGB BLD-MCNC: 11 G/DL (ref 11.5–16)
INHALED O2 FLOW RATE: 8 L/MIN
MCH RBC QN AUTO: 26 PG (ref 25–34)
MCHC RBC AUTO-ENTMCNC: 32 G/DL (ref 32–36)
MCV RBC AUTO: 83 FL (ref 80–99)
PCO2 BLDA: 43 MMHG (ref 35–45)
PH BLDA: 7.39 [PH] (ref 7.37–7.43)
PLATELET # BLD: 243 10^3/UL (ref 130–400)
PMV BLD AUTO: 12.1 FL (ref 7.4–10.4)
PO2 BLDA: 48 MMHG (ref 79–93)
POTASSIUM SERPL-SCNC: 4.6 MMOL/L (ref 3.6–5)
RBC # BLD AUTO: 4.17 10^6/UL (ref 4.35–5.85)
SAO2 % BLDA FROM PO2: 82 % (ref 94–100)
SODIUM SERPL-SCNC: 139 MMOL/L (ref 135–145)
VENTILATION MODE VENT: NO
WBC # BLD AUTO: 11.2 10^3/UL (ref 4.3–11)

## 2018-07-24 RX ADMIN — DILTIAZEM HYDROCHLORIDE SCH MG: 180 CAPSULE, COATED, EXTENDED RELEASE ORAL at 08:40

## 2018-07-24 RX ADMIN — CLOPIDOGREL BISULFATE SCH MG: 75 TABLET, FILM COATED ORAL at 08:40

## 2018-07-24 RX ADMIN — LEVOTHYROXINE SODIUM SCH MCG: 0.09 TABLET ORAL at 08:40

## 2018-07-24 RX ADMIN — RISPERIDONE SCH MG: 0.25 TABLET, FILM COATED ORAL at 08:40

## 2018-07-24 RX ADMIN — METOPROLOL TARTRATE SCH MG: 50 TABLET, FILM COATED ORAL at 08:40

## 2018-07-24 RX ADMIN — IPRATROPIUM BROMIDE AND ALBUTEROL SULFATE SCH ML: .5; 3 SOLUTION RESPIRATORY (INHALATION) at 19:01

## 2018-07-24 RX ADMIN — CEFDINIR SCH MG: 300 CAPSULE ORAL at 10:05

## 2018-07-24 RX ADMIN — METOPROLOL TARTRATE SCH MG: 50 TABLET, FILM COATED ORAL at 21:04

## 2018-07-24 RX ADMIN — RIVAROXABAN SCH MG: 15 TABLET, FILM COATED ORAL at 21:04

## 2018-07-24 RX ADMIN — ALBUTEROL SULFATE PRN MG: 2.5 SOLUTION RESPIRATORY (INHALATION) at 14:01

## 2018-07-24 RX ADMIN — IPRATROPIUM BROMIDE AND ALBUTEROL SULFATE SCH ML: .5; 3 SOLUTION RESPIRATORY (INHALATION) at 10:24

## 2018-07-24 RX ADMIN — SODIUM CHLORIDE SCH MLS/HR: 900 INJECTION, SOLUTION INTRAVENOUS at 00:59

## 2018-07-24 RX ADMIN — IPRATROPIUM BROMIDE AND ALBUTEROL SULFATE SCH ML: .5; 3 SOLUTION RESPIRATORY (INHALATION) at 07:11

## 2018-07-24 RX ADMIN — NICOTINE SCH MG: 7 PATCH, EXTENDED RELEASE TRANSDERMAL at 14:02

## 2018-07-24 RX ADMIN — RISPERIDONE SCH MG: 0.25 TABLET, FILM COATED ORAL at 21:04

## 2018-07-24 RX ADMIN — MONTELUKAST SCH MG: 10 TABLET, FILM COATED ORAL at 08:40

## 2018-07-24 RX ADMIN — PANTOPRAZOLE SODIUM SCH MG: 40 TABLET, DELAYED RELEASE ORAL at 08:40

## 2018-07-24 RX ADMIN — CEFDINIR SCH MG: 300 CAPSULE ORAL at 21:04

## 2018-07-24 RX ADMIN — IPRATROPIUM BROMIDE AND ALBUTEROL SULFATE SCH ML: .5; 3 SOLUTION RESPIRATORY (INHALATION) at 14:00

## 2018-07-24 RX ADMIN — ASPIRIN SCH MG: 81 TABLET ORAL at 08:40

## 2018-07-24 RX ADMIN — ALPRAZOLAM SCH MG: 0.25 TABLET ORAL at 21:04

## 2018-07-24 RX ADMIN — ATORVASTATIN CALCIUM SCH MG: 20 TABLET, FILM COATED ORAL at 21:04

## 2018-07-24 RX ADMIN — Medication SCH MG: at 08:40

## 2018-07-24 RX ADMIN — LISINOPRIL SCH MG: 5 TABLET ORAL at 08:40

## 2018-07-24 NOTE — PROGRESS NOTE-HOSPITALIST
Subjective


HPI/CC On Admission


Date Seen by Provider:  2018


Time Seen by Provider:  07:28


Pt is a 86yoCF known to me from previous admissions who presented to the ER 

with CC of near syncope. She does not recall actually passing out but thinks 

she was very close to LOC. She was at a birthday party for her great-grandson 

and on the drive home started to feel sick to her stomach and developed upper 

abd and back pain. She ultimately vomited twice. She reports about a 1 1/2 week 

history of feeling her stomach has been bloated and having abd pain. She was 

seen by her PCP on  for a cough and SOB and was prescribed an abx and 

prednisone but has continued to cough without any improvement.


Subjective/Events-last exam


Pt reports feeling better this AM. RN states very confused and agitated 

overnight.





Objective


Exam


Vital Signs





Vital Signs








  Date Time  Temp Pulse Resp B/P (MAP) Pulse Ox O2 Delivery O2 Flow Rate FiO2


 


18 07:11     87 Nasal Cannula 6.00 


 


18 04:00 98.6 63 27 156/77 (103)    





Capillary Refill : Less Than 3 Seconds


General Appearance:  No Apparent Distress, Chronically ill


Respiratory:  Lungs Clear, No Respiratory Distress; No Crackles


Cardiovascular:  Regular Rate, Rhythm, No Murmur


Gastrointestinal:  Normal Bowel Sounds, Non Tender, Soft


Extremity:  Non Tender, No Calf Tenderness, No Pedal Edema


Neurologic/Psychiatric:  Alert, Other (oriented to person and situation- not to 

place)





Results/Procedures


Lab


Laboratory Tests


18 05:10








Patient resulted labs reviewed.


Imaging:  Reviewed Imaging Report





Assessment/Plan


Assessment and Plan


Assess & Plan/Chief Complaint


NSTEMI





Diagnosis/Problems


Diagnosis/Problems





(1) NSTEMI (non-ST elevated myocardial infarction)


Status:  Acute


Assessment & Plan:  Dr Jordan contacted last night, appreciate recs


Cardiac cath revealed occlusion of RCA with ALEC placed


Continue Plavix and ASA





(2) Emphysema/COPD


Status:  Chronic


Assessment & Plan:  On 2lpm at baseline


Just completed course of abx and prednisone per PCP


Up to 6lpm with marginal sats all night


Discussed with Dr Snyder- will get CXR


Qualifiers:  


   Emphysema type:  unspecified  Qualified Codes:  J43.9 - Emphysema, 

unspecified


(3) Essential (primary) hypertension


Status:  Chronic


Assessment & Plan:  Continue home meds





(4) Hypothyroidism


Status:  Chronic


Assessment & Plan:  Continue home meds


Qualifiers:  


   Hypothyroidism type:  unspecified  Qualified Codes:  E03.9 - Hypothyroidism, 

unspecified


(5) Atrial fibrillation


Assessment & Plan:  Continue Home diltiazem and metoprolol


Continue Xarelto


Qualifiers:  


   Atrial fibrillation type:  paroxysmal  Qualified Codes:  I48.0 - Paroxysmal 

atrial fibrillation


(6) Prophylactic measure


Assessment & Plan:  HH diet


Xarelto


Saline lock








Clinical Quality Measures


DVT/VTE Risk/Contraindication:


Risk Factor Score Per Nursin


RFS Level Per Nursing on Admit:  2=Moderate











GERMAN GRADY MD 2018 07:34

## 2018-07-24 NOTE — DIAGNOSTIC IMAGING REPORT
INDICATION: 

Hypoxia. 



COMPARISON:   

07/22/2018. 



FINDINGS: 

Severe COPD is noted as a chronic finding. There is symmetrical

hyperexpansion of the lungs redemonstrated. There is callus

formation associated with multiple old healed or healing rib

fractures. No acute chest wall pathology is apparent. The heart

size is at the upper limits but unchanged from the previous exam.

No convincing evidence for significant pleural fluid. There is

some new nodular parenchymal foci in the left midlung which

reflect a change from the recent exam and foci of pneumonia are

suspected. The right middle lobe partial atelectasis has

improved. Generalized prominence of the interstitial lung

markings peripherally has decreased which may reflect mild edema.





IMPRESSION: 

Severe chronic lung disease is present with new nodular pulmonary

opacities in the mid left lung developed in the past 2 days,

presumed foci of pneumonia. Some increased interstitial changes

are suspicious for mild interstitial edema. Stable cardiomegaly.

No obvious pleural fluid. Chronic rib deformities are stable.



Dictated by: 



  Dictated on workstation # [id number]

## 2018-07-24 NOTE — PULMONARY PROGRESS NOTE
Subjective


Time Seen by Provider:  05:31


Subjective/Events-last exam


Pt is s/p cath. No complications noted.





Sepsis Event


Evaluation


Height, Weight, BMI


Height: 5'1.00"


Weight: 108lbs. 6.0oz. 48.493282dg; 20.7 BMI


Method:Stated





Exam


Exam





Vital Signs








  Date Time  Temp Pulse Resp B/P (MAP) Pulse Ox O2 Delivery O2 Flow Rate FiO2


 


7/24/18 04:00     94 Nasal Cannula 6.00 


 


7/24/18 04:00 98.6 63 27 156/77 (103) 90 Nasal Cannula 6.00 


 


7/24/18 01:00  60      


 


7/24/18 00:00 98.3 55 23 136/60 (85) 88 Nasal Cannula 6.00 


 


7/24/18 00:00     94 Nasal Cannula 6.00 


 


7/23/18 23:00  64 22 128/63 (84) 92 Nasal Cannula 6.00 


 


7/23/18 22:56      Nasal Cannula 6.00 


 


7/23/18 22:00  72 16 118/75 (89) 93 OxyMask 6.00 


 


7/23/18 21:18      OxyMask 6.00 


 


7/23/18 21:00  81 21 137/75 (95) 91 Nasal Cannula 5.00 


 


7/23/18 21:00     96 OxyMask 6.00 


 


7/23/18 20:00  70 20 122/58 (79) 94 OxyMask 5.00 


 


7/23/18 20:00     94 OxyMask 6.00 


 


7/23/18 19:44      OxyMask 5.00 


 


7/23/18 19:36     95 OxyMask 6.00 


 


7/23/18 19:00 99.4 69 21 124/67 (86) 94 OxyMask 6.00 


 


7/23/18 19:00  76      


 


7/23/18 18:00  73  162/56 (91)    


 


7/23/18 18:00  73 24 147/73 (97) 93 Nasal Cannula 4.00 


 


7/23/18 17:30  61 29 155/65 (95) 95 Nasal Cannula 4.00 


 


7/23/18 17:30  61  172/60 (97)    


 


7/23/18 17:25  63  174/61 (98)    


 


7/23/18 17:15  64 21 157/80 (105) 90 Nasal Cannula 4.00 


 


7/23/18 17:10  61  187/65 (105)    


 


7/23/18 17:00 97.0 62 14 147/65 (92) 92 Nasal Cannula 4.00 


 


7/23/18 16:55  64  186/65 (105)    


 


7/23/18 16:45  64 18 167/90 (115) 92 Nasal Cannula 4.00 


 


7/23/18 16:40  63  181/63 (102)    


 


7/23/18 16:30  63 29 154/83 (106) 91 Nasal Cannula 4.00 


 


7/23/18 16:25  63  191/70 (110)    


 


7/23/18 16:15  58 22 148/69 (95) 94 Nasal Cannula 4.00 


 


7/23/18 16:10  59  185/66 (105)    


 


7/23/18 16:00  61 29 158/77 (104) 90 Nasal Cannula 4.00 


 


7/23/18 16:00     94 Nasal Cannula 6.00 


 


7/23/18 15:55  61  200/74 (116)    


 


7/23/18 15:45  62 11 164/83 (110) 92 Nasal Cannula 4.00 


 


7/23/18 15:40  59  197/71 (113)    


 


7/23/18 15:30  61 19 175/80 (111) 93 Nasal Cannula 4.00 


 


7/23/18 15:25  60  196/71 (112)    


 


7/23/18 15:21     90 Nasal Cannula 4.00 


 


7/23/18 15:10  61 15 170/77 (108) 89 Nasal Cannula 4.00 


 


7/23/18 12:00     93 Nasal Cannula 4.00 


 


7/23/18 12:00  58 15 167/74 (105) 93 Nasal Cannula 4.00 


 


7/23/18 11:30     88 Nasal Cannula 3.00 


 


7/23/18 11:25     92 Nasal Cannula 4.00 


 


7/23/18 11:00  54 30 175/84 (114) 90 Nasal Cannula 3.00 


 


7/23/18 10:25     91 Nasal Cannula 3.00 


 


7/23/18 10:00  60 15 168/84 (112) 92 Nasal Cannula 3.00 


 


7/23/18 09:00  63 14 161/79 (106) 92 Nasal Cannula 3.00 


 


7/23/18 09:00     96 Nasal Cannula 3.00 


 


7/23/18 08:02     94 Nasal Cannula 3.00 


 


7/23/18 08:00 98.8 65 38 154/74 (100) 96 Nasal Cannula 3.00 


 


7/23/18 08:00     96 Nasal Cannula 3.00 


 


7/23/18 07:00  75 24 160/133 (142) 93 Nasal Cannula 3.00 


 


7/23/18 07:00  73      


 


7/23/18 06:00  67 31 178/103 (128) 94 Nasal Cannula 3.00 














I & O 


 


 7/24/18





 07:00


 


Intake Total 600 ml


 


Balance 600 ml








Height & Weight


Height: 5'1.00"


Weight: 108lbs. 6.0oz. 48.621582ho; 20.7 BMI


Method:Stated


General Appearance:  No Apparent Distress, Chronically ill


HEENT:  PERRL/EOMI, Normal ENT Inspection, Pharynx Normal


Neck:  Full Range of Motion, Normal Inspection, Non Tender, Supple


Respiratory:  Lungs Clear, No Respiratory Distress


Cardiovascular:  Regular Rate, Rhythm, No Murmur


Capillary Refill:  Less Than 3 Seconds


Extremity:  Non Tender, No Calf Tenderness, No Pedal Edema; No Swelling


Neurologic/Psychiatric:  Alert, Oriented x3, Normal Mood/Affect


Skin:  Normal Color, Warm/Dry





Results


Lab


Laboratory Tests


7/22/18 15:33








7/23/18 03:00














Assessment/Plan


Assessment/Plan


NSTEMI s/p cath 


   -Cardiology is following    


   -ASA, plavix, lovenox


CAD


   -Cardiology following 


oxygen dependent severe COPD with emphysema 


   -SVNS 


   -monitor 


Afib 


   -Diltiazem, metoprolol Xarelto


Atelectasis 


   -Increase activity, IS 


fracture ribs 


Atelectasis 


Hx of renal cell carcinoma s/p R nephrectomy 11/16











TIO TORIBIO DO Jul 24, 2018 05:28

## 2018-07-24 NOTE — CARDIOLOGY PROGRESS NOTE
Cardiology SOAP Progress Note


Subjective:


Events of overnight noted.  No chest pain





Objective:


I&O/Vital Signs











 7/24/18 7/24/18 7/24/18 7/24/18





 11:30 12:22 12:22 12:55


 


Temp   98.4 


 


Pulse   56 


 


Resp   21 


 


B/P (MAP)   112/65 (81) 


 


Pulse Ox 95 94 94 83


 


O2 Delivery High Flow N/C High Flow N/C High Flow N/C High Flow N/C


 


O2 Flow Rate 6.00 5.00 5.00 5.00


 


    





 7/24/18 7/24/18 7/24/18 7/24/18





 12:56 13:00 14:03 16:30


 


Temp    97.4


 


Pulse  58  59


 


Resp    13


 


B/P (MAP)    100/42 (61)


 


Pulse Ox 86  90 93


 


O2 Delivery High Flow N/C  High Flow N/C High Flow N/C


 


O2 Flow Rate 6.00  8.00 8.00


 


    





 7/24/18 7/24/18 7/24/18 7/24/18





 16:30 19:00 19:01 19:20


 


Temp    97.5


 


Pulse  62  66


 


Resp    24


 


B/P (MAP)    130/68 (88)


 


Pulse Ox 93  89 90


 


O2 Delivery High Flow N/C  High Flow N/C High Flow N/C


 


O2 Flow Rate 8.00  8.00 8.00


 


    





 7/24/18 7/24/18 7/24/18 7/24/18





 19:20 19:35 19:38 20:00


 


Pulse Ox 90   90


 


O2 Delivery High Flow N/C OxyMask OxyMask OxyMask


 


O2 Flow Rate 8.00 6.00 8.00 8.00














 7/24/18





 00:00


 


Intake Total 600 ml


 


Balance 600 ml








Weight (Pounds):  110


Weight (Ounces):  6.0


Weight (Calculated Kilograms):  49.536502


Constitutional:  appears stated age, AAO x 3; No apparent distress; well-

developed, well-nourished


Respiratory:  No accessory muscle use, No respiratory distress, No chest tender

, No chest expansion is symmetric; chest is bilaterally symmetric; No lungs 

clear to percussion; lungs clear to auscultation; No crackles, No rhonchi, No 

rales, No stridor, No wheezing, No pleural rub, No other


Cardiovascular:  regular rate-rhythm; No irregularly irregular, No extra beats, 

No parasternal heave is noted, No JVD, No edema, No bradycardia, No tachycardia

, No point of maximal impulse, No cardiac thrills are palpable; S1 and S2; No 

gallop/S3, No gallop/S4, No diastolic murmur, No systolic murmur, No friction 

rub, No click, No other


Gastrointestional:  No tender, No soft, No round, No distended, No pulsatile 

mass, No organomegaly, No guarding, No rebound, No tenderness, No hernia, No 

mass, No audible bowel sounds, No abnormal bowel sounds, No abdominal bruits, 

No spleenomegaly, No other


Extremities:  No normal range of motion, No non-tender, No normal inspection, 

No pedal edema, No calf tenderness, No normal capillary refill, No pelvis stable

, No calf tenderness, No inflammation, No pedal edema, No slow capillary refill

, No swelling, No other, No abrasion, No clubbing, No cyanosis, No ecchymosis, 

No laceration, No no lower extremity edema bilateral, No significant edema, No 

tenderness, No wound


Neurologic/Psychiatric:  no motor/sensory deficits, alert, normal mood/affect, 

oriented x 3, power is 5/5 both on sides


Skin:  No normal color, No warm/dry, No cyanosis, No cool, No diaphoresis, No 

damp, No ecchymosis, No jaundice, No mottled, No pallor, No rash, No tattoos/

piercings, No ulcerations, No rash on exposed areas, No ulcerations on exposed 

areas, No other





Results/Procedures:


Labs


Laboratory Tests


7/24/18 05:10: 


White Blood Count 11.2H, Red Blood Count 4.17L, Hemoglobin 11.0L, Hematocrit 34L

, Mean Corpuscular Volume 83, Mean Corpuscular Hemoglobin 26, Mean Corpuscular 

Hemoglobin Concent 32, Red Cell Distribution Width 16.4H, Platelet Count 243, 

Mean Platelet Volume 12.1H, Sodium Level 139, Potassium Level 4.6, Chloride 

Level 104, Carbon Dioxide Level 24, Anion Gap 11, Blood Urea Nitrogen 16, 

Creatinine 1.08, Estimat Glomerular Filtration Rate 48, BUN/Creatinine Ratio 15

, Glucose Level 109H, Calcium Level 9.2, Troponin I 0.75*H


7/24/18 09:52: B-Type Natriuretic Peptide 843.2H


7/24/18 14:26: 


Blood Gas Puncture Site RIGHT BRACHIAL, Blood Gas Patient Temperature 97.6, 

Arterial Blood pH 7.39, Arterial Blood Partial Pressure CO2 43, Arterial Blood 

Partial Pressure O2 48L, Arterial Blood HCO3 26, Arterial Blood Total CO2 26.9, 

Arterial Blood Oxygen Saturation 82L, Arterial Blood Base Excess 1.0, Sterling 

Test NA, Blood Gas Ventilator Setting NO, Blood Gas Inspired Oxygen 8








A/P:


Assessment/Dx:


Non-STEMI,


Paroxysmal atrial fibrillation,


Hypothyroidism,


Anemia,


Hypoxia, pneumonia


Plan:


Non-STEMI, positive troponin.  EKG did not show any ST deviation.  Aspirin, 

Plavix, Lovenox.  Continue metoprolol, lisinopril.  We'll start low-dose 

Lipitor.  Coronary angiography on 7/23/2018 showed moderate mid LAD stenosis 

and subtotal occlusion of ostial RCA which was treated successfully with drug-

eluting stent.  Patient will continue on aspirin, Plavix.





Hypoxia, pneumonia: On IV antibiotics.  Defer to Dr. Castañeda, Dr. Snyder.





Echocardiogram showed normal LV function with no severe valvular heart disease.





Paroxysmal atrial fibrillation, continue Xarelto and rate controlling agent.





Hypothyroidism, continue levothyroxine.





Anemia, deferred to Dr. Castañeda.








Thank you for your consultation. Please call me if you have any questions.








AIDAN Jordan MD, FACP, FACC, FSCAI, FHRS, CCDS


Interventional Cardiology


Cardiac Electrophysiology


Vascular Medicine and Endovascular Interventions











MARITZA JORDAN MD Jul 24, 2018 4:55 pm

## 2018-07-24 NOTE — OCCUPATIONAL THERAPY EVAL
OT Evaluation-General/PLF


Medical Diagnosis


Admission Date


Jul 22, 2018 at 17:00


Medical Diagnosis:  NSTEMI


Onset Date:  Jul 22, 2018





Therapy Diagnosis


Therapy Diagnosis:  decr self care, decr funct mob, decr act inocente, weakness





Height/Weight


Height (Feet):  5


Height (Inches):  1.00


Weight (Pounds):  110


Weight (Ounces):  6.0





Precautions


Precautions/Isolations:  Fall Prevention, Standard Precautions


Safety Interventions:  Bed Exit Alarm, Reorient-PRN





Referral


Physician:  Garry


Referral Reason:  Evaluation/Treatment





Medical History


Pertinent Medical History:  Atrial Fib, COPD, DM, HTN, Hypothroidism, Renal 

Insufficiency, Smoking


Additional Medical History


Smoker until 2017. Lumpectomy. Kidney cancer. Thyroidectomy. Chronic O2 at home 

2-3 L, osteoporosis. chronic back pain. [place].


Current History


Admitted through ED with chest pain. Cardiac stent put in 7-23-18. Also has 

pneumonia


Reviewed History:  Yes





Social History


Home:  Single Level


Current Living Status:  Alone





ADL-Prior Level of Function


ADL PLOF Comments


Pt reported that she has been able to manage all of her basic self care needs 

and care for her home, including laundry, cleaning, simple meals. She drove 

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 She is retired from [employer].





OT Current Status


Subjective


Pt seen in room, up in bed, agreeable to OT. pain reported 0/10





Appearance


Alert, cooperative





Mental Status/Objective


Attachments:  Central Line, IV, Oxygen (*L/min), Telemetry





Current


Hand Dominance:  Right


Upper Extremity ROM


Grossly WFL bilat


Upper Extremity Strength


Grossly 4/5 bilat





ADL-Treatment


ADL-Current


Pt was able to get out of bed with CGA/min A and walked 125feet with CGA, FWW. 

She said she has been using a bed pan and had been able to feed herself. She 

wants to go home ASAP, with home health


              Functional [place] Measure


0=Not Assessed/NA   4=Minimal Assistance


1=Total Assistance   5=Supervision or Setup


2=Maximal Assistance   6=Modified [place]


3=Moderate Assistance   7=Complete IndependenceIRFPAI Quality Coding Scale











6 Independent with activity with or without an assistive device


 


5  Patient requires set up or clean up by helper.  Patient completes activity  

by  themselves


 


4 Supervision or touching assist (CGA). [place] provide cues , steadying assist


 


3 The helper provides less than half the effort to complete the activity


 


2 The helper provides more than half the effort to complete the activity


 


1 Dependent.  The helper does all the effort to complete an activity 


 


7 Patient refused to complete or attempt activity


 


9 The patient did not perform the activity before the current illness or injury


 


88 Not attempted due to Medical conditions or safety concerns











Education


OT Patient Education:  Purpose of tx/functional activities, Rehab process


Teaching Recipient:  Patient


Response to Teaching:  Verbalize Understanding





OT Long Term Goals


Long Term Goals


Time Frame:  Jul 31, 2018


Eating (FIM):  6


Grooming(FIM):  6


Bathing(FIM):  5


Upper Body Dressing(FIM):  5


Lower Body Dressing(FIM):  5


Toileting(FIM):  5


Toilet/Commode Transfer(FIM):  5


Shower Transfer(FIM):  5


Additional Goals:  1-Demonstrate ADL Tasks, 2-Verbalize Understanding, 3-

ImproveStrength/Jennifer


1=Demonstrate adherence to instructed precautions during ADL tasks.


2=Patient will verbalize/demonstrate understanding of assistive devices/

modifications for ADL.


3=Patient will improve strength/tolerance for activity to enable patient to 

perform ADL's.





OT Education/Plan


Problem List/Assessment


Assessment:  Decreased Activ Tolerance, Decreased UE Strength, Dependent 

Transfers, Impaired Self-Care Skills


Pt would benefit from skilled OT to increase her independence in basic self care





Discharge Recommendations


Plan/Recommendations:  Continue POC





Treatment Plan/Plan of Care


Treatment,Training & Education:  Yes


Patient would benefit from OT for education, treatment and training to promote 

independence in ADL's, mobility, safety and/or upper extremity function for ADL'

s.


Plan of Care:  ADL Retraining, Functional Mobility, UE Funct Exercise/Act, UE 

Neuromus Re-Ed/Coord, OTHER (energy conservation education)


Treatment Duration:  Jul 31, 2018


Frequency:  5 times per week


Estimated Hrs Per Day:  .5 hour per day


Agreement:  Yes


Rehab Potential:  Fair





Time/GCodes


Start Time:  16:55


Stop Time:  17:12


Total Time Billed (hr/min):  17


Billed Treatment Time


visit, 17 minutes evaluation moderate intensity











MARK ANTHONY KERN OT Jul 24, 2018 17:51

## 2018-07-24 NOTE — PHYSICAL THERAPY EVALUATION
PT Evaluation-General


Medical Diagnosis


Admission Date


2018 at 17:00


Medical Diagnosis:  NSTEMI


Onset Date:  2018





Therapy Diagnosis


Therapy Diagnosis:  decrease functional mobility/strength and pulmonary function





Height/Weight


Height (Feet):  5


Height (Inches):  1.00


Weight (Pounds):  110


Weight (Ounces):  6.0





Precautions


Precautions/Isolations:  Fall Prevention, Standard Precautions





Weight Bear Status


Right Lower Extremity:  Right


Weight Bearing/Tolerated


Left Lower Extremity:  Left


Weight Bearing/Tolerated





Referral


Physician:  Garry


Reason for Referral:  Evaluation/Treatment





Medical History


Pertinent Medical History:  Atrial Fib, COPD, DM, HTN, Hypothroidism, Renal 

Insufficiency, Smoking


Current History


EMS secondary to CP after grandsons birthday party with 2-3 episodes of "

falling asleep".


Reviewed History:  Yes





Social History


Home:  Single Level


Current Living Status:  Alone





Prior/Core FIM


Prior Level of Function


              Functional [place] Measure


0=Not Assessed/NA   4=Minimal Assistance


1=Total Assistance   5=Supervision or Setup


2=Maximal Assistance   6=Modified [place]


3=Moderate Assistance   7=Complete [place]


Bed Mobility:  6


Transfers (B,C,W/C) (FIM):  6


Gait:  6





PT Evaluation-Current


Subjective


Patient agrees to PT.  She c/o fatigue.





Pain





   Numeric Pain Scale:  0-No Pain


   Location:  No Pain Reported





Objective


Patient Orientation:  Normal For Age


Problem Solving:  Fair


Attachments:  Oxygen (8L HF NC), IV





ROM/Strength


ROM Lower Extremities


bilateral LE WFL


Strength Lower Extremities


3/5 grossly bilateral LE





Integumentary/Posture


Integumentary


refer to nursing notes


Bowel Incontinence:  No


Bladder Incontinence:  Yes


Posture


kyphotic





Neuromuscular


(Tone, Coordination, Reflexes)


grossly intact





Sensory


Vision:  Functional


Hearing:  Impaired


Sensation Right Lower Extremit:  Impaired


Sensation Left Lower Extremity:  Impaired





Transfers


              Functional [place] Measure


0=Not Assessed/NA   4=Minimal Assistance


1=Total Assistance   5=Supervision or Setup


2=Maximal Assistance   6=Modified [place]


3=Moderate Assistance   7=Complete [place]


Transfers (B, C, W/C) (FIM):  4


Scootin


Rollin


Supine to/from Sit:  4


Sit to/from Stand:  4





Gait


Mode of Locomotion:  Walk


Anticipated Mode of Locomotion:  Walk


Gait (FIM):  2


Distance (FIM):  4=885-52 ft


Distance:  125'


Gait Level of Assist:  4


Gait Assistive Device:  FWW


Comments/Gait Description


slow, functional gait sequence





Balance


Sitting Static:  Normal


Sitting Dynamic:  Normal


Standing Static:  Fair


 Standing Dynamic:  Fair





Assessment/Needs


86 y.o. female, will benefit from skilled PT to address functional strength and 

mobility to improve current LOF and to safely return to home at maximum LOF.  

Patient fatigues very quickly with minimal activity.  She is up in recliner 

with needs met.  SAO2 did decrease to 83% with activity with recovery of ~2 

min.  RN present.


Rehab Potential:  Guarded





PT Long Term Goals


Long Term Goals


PT Long Term Goals Time Frame:  Aug 10, 2018


Transfers (B,C,W/C) (FIM):  6


Gait (FIM):  6


Gait distance (FIM):  3=150 ft


Distance:  250'


Gait Level of Assist:  6


Gait Assistive Device:  FWW





PT Plan


Problem List


Problem List:  Activity Tolerance, Functional Strength, Safety, Balance, Gait, 

Transfer, Bed Mobility





Treatment/Plan


Treatment Plan:  Continue Plan of Care


Treatment Plan:  Bed Mobility, Education, Functional Activity Jennifer, Functional 

Strength, Gait, Safety, Therapeutic Exercise, Transfers


Treatment Duration:  Aug 10, 2018


Frequency:  6 times per week


Estimated Hrs Per Day:  .25 hour per day


Patient and/or Family Agrees t:  Yes





Discharge Recommendations


Therapy D/C Recommendations:  Skilled Nursing (TCU/NH)





Time/GCodes


Time In:  1038


Time Out:  1100


Total Billed Treatment Time:  22


Total Billed Treatment


1 visit


EVMod 22 min


G Codes Necessary:  MELIDA Green PT 2018 11:17

## 2018-07-25 VITALS — SYSTOLIC BLOOD PRESSURE: 122 MMHG | DIASTOLIC BLOOD PRESSURE: 54 MMHG

## 2018-07-25 VITALS — DIASTOLIC BLOOD PRESSURE: 66 MMHG | SYSTOLIC BLOOD PRESSURE: 154 MMHG

## 2018-07-25 VITALS — SYSTOLIC BLOOD PRESSURE: 122 MMHG | DIASTOLIC BLOOD PRESSURE: 60 MMHG

## 2018-07-25 VITALS — DIASTOLIC BLOOD PRESSURE: 60 MMHG | SYSTOLIC BLOOD PRESSURE: 137 MMHG

## 2018-07-25 VITALS — DIASTOLIC BLOOD PRESSURE: 63 MMHG | SYSTOLIC BLOOD PRESSURE: 101 MMHG

## 2018-07-25 LAB
APTT PPP: YELLOW S
BACTERIA #/AREA URNS HPF: NEGATIVE /HPF
BASOPHILS # BLD AUTO: 0 10^3/UL (ref 0–0.1)
BASOPHILS NFR BLD AUTO: 0 % (ref 0–10)
BILIRUB UR QL STRIP: NEGATIVE
BUN/CREAT SERPL: 20
CALCIUM SERPL-MCNC: 8.8 MG/DL (ref 8.5–10.1)
CHLORIDE SERPL-SCNC: 106 MMOL/L (ref 98–107)
CO2 SERPL-SCNC: 26 MMOL/L (ref 21–32)
CREAT SERPL-MCNC: 1.34 MG/DL (ref 0.6–1.3)
EOSINOPHIL # BLD AUTO: 0.1 10^3/UL (ref 0–0.3)
EOSINOPHIL NFR BLD AUTO: 1 % (ref 0–10)
ERYTHROCYTE [DISTWIDTH] IN BLOOD BY AUTOMATED COUNT: 16.7 % (ref 10–14.5)
FIBRINOGEN PPP-MCNC: CLEAR MG/DL
GFR SERPLBLD BASED ON 1.73 SQ M-ARVRAT: 38 ML/MIN
GLUCOSE SERPL-MCNC: 100 MG/DL (ref 70–105)
GLUCOSE UR STRIP-MCNC: NEGATIVE MG/DL
HCT VFR BLD CALC: 30 % (ref 35–52)
HGB BLD-MCNC: 9.2 G/DL (ref 11.5–16)
KETONES UR QL STRIP: NEGATIVE
LEUKOCYTE ESTERASE UR QL STRIP: (no result)
LYMPHOCYTES # BLD AUTO: 1.6 X 10^3 (ref 1–4)
LYMPHOCYTES NFR BLD AUTO: 14 % (ref 12–44)
MAGNESIUM SERPL-MCNC: 2.1 MG/DL (ref 1.8–2.4)
MANUAL DIFFERENTIAL PERFORMED BLD QL: NO
MCH RBC QN AUTO: 26 PG (ref 25–34)
MCHC RBC AUTO-ENTMCNC: 31 G/DL (ref 32–36)
MCV RBC AUTO: 84 FL (ref 80–99)
MONOCYTES # BLD AUTO: 0.9 X 10^3 (ref 0–1)
MONOCYTES NFR BLD AUTO: 8 % (ref 0–12)
NEUTROPHILS # BLD AUTO: 8.4 X 10^3 (ref 1.8–7.8)
NEUTROPHILS NFR BLD AUTO: 76 % (ref 42–75)
NITRITE UR QL STRIP: NEGATIVE
PH UR STRIP: 5 [PH] (ref 5–9)
PHOSPHATE SERPL-MCNC: 4.2 MG/DL (ref 2.3–4.7)
PLATELET # BLD: 216 10^3/UL (ref 130–400)
PMV BLD AUTO: 11.7 FL (ref 7.4–10.4)
POTASSIUM SERPL-SCNC: 3.9 MMOL/L (ref 3.6–5)
PROT UR QL STRIP: (no result)
RBC # BLD AUTO: 3.59 10^6/UL (ref 4.35–5.85)
RBC #/AREA URNS HPF: (no result) /HPF
SODIUM SERPL-SCNC: 141 MMOL/L (ref 135–145)
SP GR UR STRIP: 1.02 (ref 1.02–1.02)
SQUAMOUS #/AREA URNS HPF: (no result) /HPF
UROBILINOGEN UR-MCNC: NORMAL MG/DL
WBC # BLD AUTO: 10.9 10^3/UL (ref 4.3–11)
WBC #/AREA URNS HPF: (no result) /HPF

## 2018-07-25 RX ADMIN — CLOPIDOGREL BISULFATE SCH MG: 75 TABLET, FILM COATED ORAL at 08:10

## 2018-07-25 RX ADMIN — METOPROLOL TARTRATE SCH MG: 50 TABLET, FILM COATED ORAL at 08:11

## 2018-07-25 RX ADMIN — CEFDINIR SCH MG: 300 CAPSULE ORAL at 21:14

## 2018-07-25 RX ADMIN — DILTIAZEM HYDROCHLORIDE SCH MG: 180 CAPSULE, COATED, EXTENDED RELEASE ORAL at 08:11

## 2018-07-25 RX ADMIN — CEFDINIR SCH MG: 300 CAPSULE ORAL at 08:11

## 2018-07-25 RX ADMIN — IPRATROPIUM BROMIDE AND ALBUTEROL SULFATE SCH ML: .5; 3 SOLUTION RESPIRATORY (INHALATION) at 14:58

## 2018-07-25 RX ADMIN — ATORVASTATIN CALCIUM SCH MG: 20 TABLET, FILM COATED ORAL at 21:14

## 2018-07-25 RX ADMIN — Medication SCH MG: at 08:11

## 2018-07-25 RX ADMIN — ASPIRIN SCH MG: 81 TABLET ORAL at 08:11

## 2018-07-25 RX ADMIN — RIVAROXABAN SCH MG: 15 TABLET, FILM COATED ORAL at 21:14

## 2018-07-25 RX ADMIN — METHYLPREDNISOLONE SODIUM SUCCINATE SCH MG: 40 INJECTION, POWDER, FOR SOLUTION INTRAMUSCULAR; INTRAVENOUS at 06:24

## 2018-07-25 RX ADMIN — PANTOPRAZOLE SODIUM SCH MG: 40 TABLET, DELAYED RELEASE ORAL at 06:24

## 2018-07-25 RX ADMIN — MONTELUKAST SCH MG: 10 TABLET, FILM COATED ORAL at 08:11

## 2018-07-25 RX ADMIN — RISPERIDONE SCH MG: 0.25 TABLET, FILM COATED ORAL at 21:14

## 2018-07-25 RX ADMIN — LEVOTHYROXINE SODIUM SCH MCG: 0.09 TABLET ORAL at 06:24

## 2018-07-25 RX ADMIN — RISPERIDONE SCH MG: 0.25 TABLET, FILM COATED ORAL at 08:11

## 2018-07-25 RX ADMIN — METHYLPREDNISOLONE SODIUM SUCCINATE SCH MG: 40 INJECTION, POWDER, FOR SOLUTION INTRAMUSCULAR; INTRAVENOUS at 19:08

## 2018-07-25 RX ADMIN — ALPRAZOLAM SCH MG: 0.25 TABLET ORAL at 21:14

## 2018-07-25 RX ADMIN — LISINOPRIL SCH MG: 5 TABLET ORAL at 08:11

## 2018-07-25 RX ADMIN — METHYLPREDNISOLONE SODIUM SUCCINATE SCH MG: 40 INJECTION, POWDER, FOR SOLUTION INTRAMUSCULAR; INTRAVENOUS at 13:52

## 2018-07-25 RX ADMIN — IPRATROPIUM BROMIDE AND ALBUTEROL SULFATE SCH ML: .5; 3 SOLUTION RESPIRATORY (INHALATION) at 07:39

## 2018-07-25 RX ADMIN — IPRATROPIUM BROMIDE AND ALBUTEROL SULFATE SCH ML: .5; 3 SOLUTION RESPIRATORY (INHALATION) at 11:09

## 2018-07-25 RX ADMIN — IPRATROPIUM BROMIDE AND ALBUTEROL SULFATE SCH ML: .5; 3 SOLUTION RESPIRATORY (INHALATION) at 18:38

## 2018-07-25 RX ADMIN — METOPROLOL TARTRATE SCH MG: 50 TABLET, FILM COATED ORAL at 21:14

## 2018-07-25 NOTE — CARDIOLOGY PROGRESS NOTE
Cardiology SOAP Progress Note


Subjective:


shortness of breath is better.





Objective:


I&O/Vital Signs











 7/24/18 7/24/18 7/25/18 7/25/18





 23:30 23:30 01:00 03:50


 


Temp  97.6  97.5


 


Pulse  57 54 61


 


Resp  20  18


 


B/P (MAP)  120/59 (79)  101/63 (76)


 


Pulse Ox 92 92  92


 


O2 Delivery High Flow N/C High Flow N/C  High Flow N/C


 


O2 Flow Rate 8.00 8.00  8.00


 


    





 7/25/18 7/25/18 7/25/18 7/25/18





 03:55 07:00 07:39 07:55


 


Pulse  70  


 


Pulse Ox 92  95 95


 


O2 Delivery High Flow N/C  High Flow N/C High Flow N/C


 


O2 Flow Rate 8.00  8.00 7.00





 7/25/18 7/25/18  





 08:11 08:14  


 


Temp  98.7  


 


Pulse  66  


 


Resp  17  


 


B/P (MAP)  122/54 (76)  


 


Pulse Ox 97 97  


 


O2 Delivery High Flow N/C Nasal Cannula  


 


O2 Flow Rate 7.00 7.00  














 7/24/18





 23:59


 


Intake Total 622 ml


 


Output Total 1100 ml


 


Balance -478 ml








Weight (Pounds):  110


Weight (Ounces):  6.0


Weight (Calculated Kilograms):  50.180085


Constitutional:  appears stated age, AAO x 3; No apparent distress; well-

developed, well-nourished


Respiratory:  No accessory muscle use, No respiratory distress, No chest tender

, No chest expansion is symmetric; chest is bilaterally symmetric; No lungs 

clear to percussion; lungs clear to auscultation; No crackles, No rhonchi, No 

rales, No stridor, No wheezing, No pleural rub, No other


Cardiovascular:  regular rate-rhythm; No irregularly irregular, No extra beats, 

No parasternal heave is noted, No JVD, No edema, No bradycardia, No tachycardia

, No point of maximal impulse, No cardiac thrills are palpable; S1 and S2; No 

gallop/S3, No gallop/S4, No diastolic murmur, No systolic murmur, No friction 

rub, No click, No other


Gastrointestional:  No tender, No soft, No round, No distended, No pulsatile 

mass, No organomegaly, No guarding, No rebound, No tenderness, No hernia, No 

mass, No audible bowel sounds, No abnormal bowel sounds, No abdominal bruits, 

No spleenomegaly, No other


Extremities:  No normal range of motion, No non-tender, No normal inspection, 

No pedal edema, No calf tenderness, No normal capillary refill, No pelvis stable

, No calf tenderness, No inflammation, No pedal edema, No slow capillary refill

, No swelling, No other, No abrasion, No clubbing, No cyanosis, No ecchymosis, 

No laceration, No no lower extremity edema bilateral, No significant edema, No 

tenderness, No wound


Neurologic/Psychiatric:  no motor/sensory deficits, alert, normal mood/affect, 

oriented x 3, power is 5/5 both on sides


Skin:  No normal color, No warm/dry, No cyanosis, No cool, No diaphoresis, No 

damp, No ecchymosis, No jaundice, No mottled, No pallor, No rash, No tattoos/

piercings, No ulcerations, No rash on exposed areas, No ulcerations on exposed 

areas, No other





Results/Procedures:


Labs


Laboratory Tests


7/24/18 14:26: 


Blood Gas Puncture Site RIGHT BRACHIAL, Blood Gas Patient Temperature 97.6, 

Arterial Blood pH 7.39, Arterial Blood Partial Pressure CO2 43, Arterial Blood 

Partial Pressure O2 48L, Arterial Blood HCO3 26, Arterial Blood Total CO2 26.9, 

Arterial Blood Oxygen Saturation 82L, Arterial Blood Base Excess 1.0, [place] 

Test NA, Blood Gas Ventilator Setting NO, Blood Gas Inspired Oxygen 8


7/25/18 05:55: 


White Blood Count 10.9, Red Blood Count 3.59L, Hemoglobin 9.2L, Hematocrit 30L, 

Mean Corpuscular Volume 84, Mean Corpuscular Hemoglobin 26, Mean Corpuscular 

Hemoglobin Concent 31L, Red Cell Distribution Width 16.7H, Platelet Count 216, 

Mean Platelet Volume 11.7H, Neutrophils (%) (Auto) 76H, Lymphocytes (%) (Auto) 

14, Monocytes (%) (Auto) 8, Eosinophils (%) (Auto) 1, Basophils (%) (Auto) 0, 

Neutrophils # (Auto) 8.4H, Lymphocytes # (Auto) 1.6, Monocytes # (Auto) 0.9, 

Eosinophils # (Auto) 0.1, Basophils # (Auto) 0.0, Sodium Level 141, Potassium 

Level 3.9, Chloride Level 106, Carbon Dioxide Level 26, Anion Gap 9, Blood Urea 

Nitrogen 27H, Creatinine 1.34H, Estimat Glomerular Filtration Rate 38, BUN/

Creatinine Ratio 20, Glucose Level 100, Lactic Acid Level 0.74, Calcium Level 

8.8, Phosphorus Level 4.2, Magnesium Level 2.1


7/25/18 06:30: 


Urine Color YELLOW, Urine Clarity CLEAR, Urine pH 5, Urine Specific Gravity 

1.020, Urine Protein 1+H, Urine Glucose (UA) NEGATIVE, Urine Ketones NEGATIVE, 

Urine Nitrite NEGATIVE, Urine Bilirubin NEGATIVE, Urine Urobilinogen NORMAL, 

Urine Leukocyte Esterase 1+H, Urine RBC (Auto) 1+H, Urine RBC RARE, Urine WBC 

RARE, Urine Squamous Epithelial Cells RARE, Urine Crystals NONE, Urine Bacteria 

NEGATIVE, Urine Casts NONE, Urine Mucus NEGATIVE, Urine Culture Indicated NO








A/P:


Assessment/Dx:


Non-STEMI,


Paroxysmal atrial fibrillation,


Hypothyroidism,


Anemia,


Hypoxia, pneumonia


Plan:


Non-STEMI, positive troponin.  EKG did not show any ST deviation.  Aspirin, 

Plavix, Lovenox.  Continue metoprolol, lisinopril.  We'll start low-dose 

Lipitor.  Coronary angiography on 7/23/2018 showed moderate mid LAD stenosis 

and subtotal occlusion of ostial RCA which was treated successfully with drug-

eluting stent.  Patient will continue on aspirin, Plavix.





Hypoxia, pneumonia: On IV antibiotics. improved but still requiring 7 Liters of 

O2 and O2 saturation is early 90s. Defer to Dr. Castañeda, Dr. Snyder.





Echocardiogram showed normal LV function with no severe valvular heart disease.





Paroxysmal atrial fibrillation, continue Xarelto and rate controlling agent.





Hypothyroidism, continue levothyroxine.





Anemia, deferred to Dr. Castañeda.








Thank you for your consultation. Please call me if you have any questions.








AIDAN Jordan MD, FACP, FACC, FSCAI, FHRS, CCDS


Interventional Cardiology


Cardiac Electrophysiology


Vascular Medicine and Endovascular Interventions





Focused Exam


Lactate Level


7/25/18 05:55: Lactic Acid Level 0.74














MARITZA JORDAN MD Jul 25, 2018 9:57 am

## 2018-07-25 NOTE — OCCUPATIONAL THER DAILY NOTE
OT Current Status-Daily Note


Subjective


Pt seen in room, up in bed, agreeable to OT. No pain mentioned.





Appearance


Alert, cooperative. Wants to go home





Mental Status/Objective


              Functional [place] Measure


0=Not Assessed/NA   4=Minimal Assistance


1=Total Assistance   5=Supervision or Setup


2=Maximal Assistance   6=Modified [place]


3=Moderate Assistance   7=Complete [place]





Other Treatment


Pt reported that she had just returned to bed from toileting. She was able to 

move from supine to sit EOB with SBA. She did 10 reps bilat UE exercise, 

working on shoulders, elbows, forearms, wrists and hands to strengthen arms to 

help with ADLs and transfers and to help increase activity tolerance and 

decrease need for O2. Pt educ on the different exercises. She was able to track 

reps herself. Pt left up in bed, all needs met.





Education


OT Patient Education:  Exercise program, Purpose of tx/functional activities


Teaching Recipient:  Patient


Teaching Methods:  Demonstration, Discussion


Response to Teaching:  Verbalize Understanding, Return Demonstration





OT Short Term Goals


Short Term Goals


1=Demonstrate adherence to instructed precautions during ADL tasks.


2=Patient will verbalize/demonstrate understanding of assistive devices/

modifications for ADL.


3=Patient will improve strength/tolerance for activity to enable patient to 

perform ADL's.





OT Long Term Goals


Long Term Goals


Time Frame:  Jul 31, 2018


Eating (FIM):  6


Grooming(FIM):  6


Bathing(FIM):  5


Upper Body Dressing(FIM):  5


Lower Body Dressing(FIM):  5


Toileting(FIM):  5


Toilet/Commode Transfer(FIM):  5


Shower Transfer(FIM):  5


Additional Goals:  1-Demonstrate ADL Tasks, 2-Verbalize Understanding, 3-

ImproveStrength/Jennifer


1=Demonstrate adherence to instructed precautions during ADL tasks.


2=Patient will verbalize/demonstrate understanding of assistive devices/

modifications for ADL.


3=Patient will improve strength/tolerance for activity to enable patient to 

perform ADL's.





OT Education/Plan


Problem List/Assessment


Pt would benefit from skilled OT to increase her independence in basic self care





Discharge Recommendations


Plan/Recommendations:  Continue POC





Treatment Plan/Plan of Care


Patient would benefit from OT for education, treatment and training to promote 

independence in ADL's, mobility, safety and/or upper extremity function for ADL'

s.


Plan of Care:  ADL Retraining, Functional Mobility, UE Funct Exercise/Act, UE 

Neuromus Re-Ed/Coord, OTHER (energy conservation education)


Treatment Duration:  Jul 31, 2018


Frequency:  5 times per week


Estimated Hrs Per Day:  .5 hour per day


Agreement:  Yes


Rehab Potential:  Fair





Time/GCodes


Start Time:  14:38


Stop Time:  14:50


Total Time Billed (hr/min):  12


Billed Treatment Time


visit, 12 minutes exercise











MARK ANTHONY KERN OT Jul 25, 2018 15:20

## 2018-07-25 NOTE — PROGRESS NOTE-HOSPITALIST
Subjective


HPI/CC On Admission


Date Seen by Provider:  2018


Time Seen by Provider:  07:22


Pt is a 86yoCF known to me from previous admissions who presented to the ER 

with CC of near syncope. She does not recall actually passing out but thinks 

she was very close to LOC. She was at a birthday party for her great-grandson 

and on the drive home started to feel sick to her stomach and developed upper 

abd and back pain. She ultimately vomited twice. She reports about a 1 1/2 week 

history of feeling her stomach has been bloated and having abd pain. She was 

seen by her PCP on  for a cough and SOB and was prescribed an abx and 

prednisone but has continued to cough without any improvement.


Subjective/Events-last exam


Pt reports feeling better. Denies any SOB.





Focused Exam


Lactate Level


18 05:55: Lactic Acid Level 0.74





Lactic Acid Level





Laboratory Tests








Test


 18


05:55


 


Lactic Acid Level


 0.74 MMOL/L


(0.50-2.00)











Objective


Exam


Vital Signs





Vital Signs








  Date Time  Temp Pulse Resp B/P (MAP) Pulse Ox O2 Delivery O2 Flow Rate FiO2


 


18 07:00  70      


 


18 03:55     92 High Flow N/C 8.00 


 


18 03:50 97.5  18 101/63 (76)    





Capillary Refill : Less Than 3 Seconds


General Appearance:  No Apparent Distress, WD/WN


Respiratory:  Lungs Clear, Decreased Breath Sounds; No Wheezing


Cardiovascular:  Regular Rate, Rhythm, No Murmur


Gastrointestinal:  Normal Bowel Sounds, Non Tender, Soft


Neurologic/Psychiatric:  Alert, Other (oriented to person and place)





Results/Procedures


Lab


Laboratory Tests


18 05:55








Patient resulted labs reviewed.


Imaging:  Reviewed Imaging Report





Assessment/Plan


Assessment and Plan


Assess & Plan/Chief Complaint


NSTEMI





Diagnosis/Problems


Diagnosis/Problems





(1) Pneumonia


Status:  Acute


Assessment & Plan:  Started on Cefdinir


Sputum culture ordered


No sepsis criteria


Qualifiers:  


   Pneumonia type:  due to unspecified organism  Laterality:  left  Lung 

location:  unspecified part of lung  Qualified Codes:  J18.9 - Pneumonia, 

unspecified organism


(2) Emphysema/COPD


Status:  Chronic


Assessment & Plan:  On 2lpm at baseline


   Up to 8lpm


Just completed course of abx and prednisone per PCP


Discussed with Dr Snyder


Continue Abx as above and start steroids


Qualifiers:  


   Emphysema type:  unspecified  Qualified Codes:  J43.9 - Emphysema, 

unspecified


(3) NSTEMI (non-ST elevated myocardial infarction)


Status:  Acute


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


Continue Plavix and ASA





(4) Essential (primary) hypertension


Status:  Chronic


Assessment & Plan:  Continue home meds





(5) Hypothyroidism


Status:  Chronic


Assessment & Plan:  Continue home meds


Qualifiers:  


   Hypothyroidism type:  unspecified  Qualified Codes:  E03.9 - Hypothyroidism, 

unspecified


(6) Atrial fibrillation


Assessment & Plan:  Continue Home diltiazem and metoprolol


Continue Xarelto


Qualifiers:  


   Atrial fibrillation type:  paroxysmal  Qualified Codes:  I48.0 - Paroxysmal 

atrial fibrillation


(7) Prophylactic measure


Assessment & Plan:   diet


Xarelto


Saline lock








Clinical Quality Measures


DVT/VTE Risk/Contraindication:


Risk Factor Score Per Nursin


RFS Level Per Nursing on Admit:  2=Moderate











GERMAN GRADY MD 2018 7:26 am

## 2018-07-25 NOTE — PHYSICAL THERAPY DAILY NOTE
PT Daily Note-Current


Subjective


Pt asleep Supine in bed upon arrival.  Pt agrees to PT.





Pain





   Location:  No Pain Reported





Mental Status


Patient Orientation:  Person, Place, Time, Situation


Attachments:  Oxygen





Transfers


              Functional [place] Measure


0=Not Assessed/NA   4=Minimal Assistance


1=Total Assistance   5=Supervision or Setup


2=Maximal Assistance   6=Modified [place]


3=Moderate Assistance   7=Complete IndependenceIRFPAI Quality Coding Scale











6 Independent with activity with or without an assistive device


 


5  Patient requires set up or clean up by helper.  Patient completes activity  

by  themselves


 


4 Supervision or touching assist (CGA). [place] provide cues , steadying assist


 


3 The helper provides less than half the effort to complete the activity


 


2 The helper provides more than half the effort to complete the activity


 


1 Dependent.  The helper does all the effort to complete an activity 


 


7 Patient refused to complete or attempt activity


 


9 The patient did not perform the activity before the current illness or injury


 


88 Not attempted due to Medical conditions or safety concerns








Scootin


Rollin


Supine to/from Sit:  5


Sit to/from Stand:  5





Weight Bearing


Right Lower Extremity:  Right


Weight Bearing/Tolerated


Left Lower Extremity:  Left


Weight Bearing/Tolerated





Gait Training


Distance (FIM):  3=150 ft


Distance:  200'


Gait Level of Assist:  5


Gait Persons Needed:  1


Gait Assistive Device:  FWW


Pt walks with a slight kyphotic posture.





Exercises


Seated Therapy Exercises:  Ankle pumps, Long arc quads, Hip flexion, Kicking 

activity


Seated Reps:  15





Treatments


Pt transfers from Supine to EOB to Standing using FWW at A.  Pt ambulates in 

hallway before returning to room to rest in recliner.  Pt takes short rest 

before completing Seated Ex.  Pt is resting in recliner with all needs met, 

call light next to pt.  RT arrives to give Breathing Tx at end of PT.





Assessment


Current Status:  Good Progress


Pt reports feeling better and is able to complete longer walk and more 

independent with transfers and ambulation today.





PT Long Term Goals


Long Term Goals


PT Long Term Goals Time Frame:  Aug 10, 2018


Transfers (B,C,W/C) (FIM):  6


Gait (FIM):  6


Gait distance (FIM):  3=150 ft


Distance:  250'


Gait Level of Assist:  6


Gait Assistive Device:  FWW





PT Plan


Problem List


Problem List:  Activity Tolerance





Treatment/Plan


Treatment Plan:  Continue Plan of Care


Treatment Plan:  Bed Mobility, Education, Functional Activity Jennifer, Functional 

Strength, Gait, Safety, Therapeutic Exercise, Transfers


Treatment Duration:  Aug 10, 2018


Frequency:  6 times per week


Estimated Hrs Per Day:  .25 hour per day


Patient and/or Family Agrees t:  Yes





Safety Risks/Education


Patient Education:  Gait Training, Transfer Techniques, Correct Positioning, 

Safety Issues


Teaching Recipient:  Patient


Teaching Methods:  Discussion


Response to Teaching:  Verbalize Understanding





Time/GCodes


Time In:  1045


Time Out:  1110


Total Billed Treatment Time:  25


Total Billed Treatment


1, GT (15m) & EX (10m)


G Codes Necessary:  SHERIF Ibarra PTA 2018 11:50

## 2018-07-25 NOTE — OCC THERAPY PROGRESS NOTE
Therapy Progress Note


[id number]   Pt seen in room, asleep in bed. Reluctantly agreed to get up out of 

bed and do UE exercises but then said she really preferred to sleep since she 

has such a hard time getting good sleep. Will attempt again later.











MARK ANTHONY KERN OT Jul 25, 2018 09:24

## 2018-07-26 ENCOUNTER — HOSPITAL ENCOUNTER (INPATIENT)
Dept: HOSPITAL 75 - 4TH | Age: 83
LOS: 9 days | DRG: 193 | End: 2018-08-04
Attending: FAMILY MEDICINE | Admitting: FAMILY MEDICINE
Payer: MEDICARE

## 2018-07-26 VITALS — DIASTOLIC BLOOD PRESSURE: 66 MMHG | SYSTOLIC BLOOD PRESSURE: 142 MMHG

## 2018-07-26 VITALS — SYSTOLIC BLOOD PRESSURE: 135 MMHG | DIASTOLIC BLOOD PRESSURE: 72 MMHG

## 2018-07-26 VITALS — DIASTOLIC BLOOD PRESSURE: 59 MMHG | SYSTOLIC BLOOD PRESSURE: 128 MMHG

## 2018-07-26 VITALS — SYSTOLIC BLOOD PRESSURE: 156 MMHG | DIASTOLIC BLOOD PRESSURE: 69 MMHG

## 2018-07-26 VITALS — BODY MASS INDEX: 20.67 KG/M2 | WEIGHT: 109.5 LBS | HEIGHT: 61 IN

## 2018-07-26 DIAGNOSIS — J18.9: Primary | ICD-10-CM

## 2018-07-26 DIAGNOSIS — T40.2X5A: ICD-10-CM

## 2018-07-26 DIAGNOSIS — J43.9: ICD-10-CM

## 2018-07-26 DIAGNOSIS — Z79.02: ICD-10-CM

## 2018-07-26 DIAGNOSIS — Z66: ICD-10-CM

## 2018-07-26 DIAGNOSIS — Z51.5: ICD-10-CM

## 2018-07-26 DIAGNOSIS — E87.5: ICD-10-CM

## 2018-07-26 DIAGNOSIS — J96.00: ICD-10-CM

## 2018-07-26 DIAGNOSIS — Z79.82: ICD-10-CM

## 2018-07-26 DIAGNOSIS — I48.0: ICD-10-CM

## 2018-07-26 DIAGNOSIS — Z95.5: ICD-10-CM

## 2018-07-26 DIAGNOSIS — I21.4: ICD-10-CM

## 2018-07-26 DIAGNOSIS — R11.2: ICD-10-CM

## 2018-07-26 DIAGNOSIS — D64.9: ICD-10-CM

## 2018-07-26 DIAGNOSIS — E03.9: ICD-10-CM

## 2018-07-26 LAB
BASOPHILS # BLD AUTO: 0 10^3/UL (ref 0–0.1)
BASOPHILS NFR BLD AUTO: 0 % (ref 0–10)
BUN/CREAT SERPL: 27
CALCIUM SERPL-MCNC: 9.1 MG/DL (ref 8.5–10.1)
CHLORIDE SERPL-SCNC: 106 MMOL/L (ref 98–107)
CO2 SERPL-SCNC: 25 MMOL/L (ref 21–32)
CREAT SERPL-MCNC: 1.32 MG/DL (ref 0.6–1.3)
EOSINOPHIL # BLD AUTO: 0 10^3/UL (ref 0–0.3)
EOSINOPHIL NFR BLD AUTO: 0 % (ref 0–10)
ERYTHROCYTE [DISTWIDTH] IN BLOOD BY AUTOMATED COUNT: 16.4 % (ref 10–14.5)
GFR SERPLBLD BASED ON 1.73 SQ M-ARVRAT: 38 ML/MIN
GLUCOSE SERPL-MCNC: 147 MG/DL (ref 70–105)
HCT VFR BLD CALC: 31 % (ref 35–52)
HGB BLD-MCNC: 9.6 G/DL (ref 11.5–16)
LYMPHOCYTES # BLD AUTO: 0.6 X 10^3 (ref 1–4)
LYMPHOCYTES NFR BLD AUTO: 5 % (ref 12–44)
MANUAL DIFFERENTIAL PERFORMED BLD QL: YES
MCH RBC QN AUTO: 26 PG (ref 25–34)
MCHC RBC AUTO-ENTMCNC: 31 G/DL (ref 32–36)
MCV RBC AUTO: 84 FL (ref 80–99)
MONOCYTES # BLD AUTO: 0.2 X 10^3 (ref 0–1)
MONOCYTES NFR BLD AUTO: 1 % (ref 0–12)
MONOCYTES NFR BLD: 1 %
NEUTROPHILS # BLD AUTO: 10.6 X 10^3 (ref 1.8–7.8)
NEUTROPHILS NFR BLD AUTO: 93 % (ref 42–75)
NEUTS BAND NFR BLD MANUAL: 95 %
PLATELET # BLD: 261 10^3/UL (ref 130–400)
PMV BLD AUTO: 12.2 FL (ref 7.4–10.4)
POTASSIUM SERPL-SCNC: 5.5 MMOL/L (ref 3.6–5)
RBC # BLD AUTO: 3.68 10^6/UL (ref 4.35–5.85)
SODIUM SERPL-SCNC: 138 MMOL/L (ref 135–145)
VARIANT LYMPHS NFR BLD MANUAL: 4 %
WBC # BLD AUTO: 11.3 10^3/UL (ref 4.3–11)

## 2018-07-26 PROCEDURE — 85007 BL SMEAR W/DIFF WBC COUNT: CPT

## 2018-07-26 PROCEDURE — 85025 COMPLETE CBC W/AUTO DIFF WBC: CPT

## 2018-07-26 PROCEDURE — 36415 COLL VENOUS BLD VENIPUNCTURE: CPT

## 2018-07-26 PROCEDURE — 80048 BASIC METABOLIC PNL TOTAL CA: CPT

## 2018-07-26 PROCEDURE — 94760 N-INVAS EAR/PLS OXIMETRY 1: CPT

## 2018-07-26 PROCEDURE — 84484 ASSAY OF TROPONIN QUANT: CPT

## 2018-07-26 PROCEDURE — 94640 AIRWAY INHALATION TREATMENT: CPT

## 2018-07-26 PROCEDURE — 71045 X-RAY EXAM CHEST 1 VIEW: CPT

## 2018-07-26 PROCEDURE — 94664 DEMO&/EVAL PT USE INHALER: CPT

## 2018-07-26 PROCEDURE — 85027 COMPLETE CBC AUTOMATED: CPT

## 2018-07-26 PROCEDURE — 94660 CPAP INITIATION&MGMT: CPT

## 2018-07-26 RX ADMIN — RISPERIDONE SCH MG: 0.25 TABLET, FILM COATED ORAL at 21:31

## 2018-07-26 RX ADMIN — Medication SCH MG: at 08:50

## 2018-07-26 RX ADMIN — LISINOPRIL SCH MG: 5 TABLET ORAL at 08:50

## 2018-07-26 RX ADMIN — CEFDINIR SCH MG: 300 CAPSULE ORAL at 08:50

## 2018-07-26 RX ADMIN — METHYLPREDNISOLONE SODIUM SUCCINATE SCH MG: 40 INJECTION, POWDER, FOR SOLUTION INTRAMUSCULAR; INTRAVENOUS at 01:55

## 2018-07-26 RX ADMIN — IPRATROPIUM BROMIDE AND ALBUTEROL SULFATE SCH ML: .5; 3 SOLUTION RESPIRATORY (INHALATION) at 10:50

## 2018-07-26 RX ADMIN — PANTOPRAZOLE SODIUM SCH MG: 40 TABLET, DELAYED RELEASE ORAL at 06:04

## 2018-07-26 RX ADMIN — ASPIRIN SCH MG: 81 TABLET ORAL at 08:50

## 2018-07-26 RX ADMIN — CEFDINIR SCH MG: 300 CAPSULE ORAL at 21:31

## 2018-07-26 RX ADMIN — CLOPIDOGREL BISULFATE SCH MG: 75 TABLET, FILM COATED ORAL at 08:50

## 2018-07-26 RX ADMIN — DILTIAZEM HYDROCHLORIDE SCH MG: 180 CAPSULE, COATED, EXTENDED RELEASE ORAL at 08:50

## 2018-07-26 RX ADMIN — RISPERIDONE SCH MG: 0.25 TABLET, FILM COATED ORAL at 08:50

## 2018-07-26 RX ADMIN — IPRATROPIUM BROMIDE AND ALBUTEROL SULFATE SCH ML: .5; 3 SOLUTION RESPIRATORY (INHALATION) at 18:42

## 2018-07-26 RX ADMIN — ALPRAZOLAM SCH MG: 0.25 TABLET ORAL at 21:31

## 2018-07-26 RX ADMIN — ATORVASTATIN CALCIUM SCH MG: 20 TABLET, FILM COATED ORAL at 21:31

## 2018-07-26 RX ADMIN — METOPROLOL TARTRATE SCH MG: 50 TABLET, FILM COATED ORAL at 21:31

## 2018-07-26 RX ADMIN — METHYLPREDNISOLONE SODIUM SUCCINATE SCH MG: 40 INJECTION, POWDER, FOR SOLUTION INTRAMUSCULAR; INTRAVENOUS at 06:11

## 2018-07-26 RX ADMIN — MONTELUKAST SCH MG: 10 TABLET, FILM COATED ORAL at 08:50

## 2018-07-26 RX ADMIN — LEVOTHYROXINE SODIUM SCH MCG: 0.09 TABLET ORAL at 06:04

## 2018-07-26 RX ADMIN — METOPROLOL TARTRATE SCH MG: 50 TABLET, FILM COATED ORAL at 08:50

## 2018-07-26 RX ADMIN — IPRATROPIUM BROMIDE AND ALBUTEROL SULFATE SCH ML: .5; 3 SOLUTION RESPIRATORY (INHALATION) at 06:45

## 2018-07-26 RX ADMIN — RIVAROXABAN SCH MG: 15 TABLET, FILM COATED ORAL at 17:00

## 2018-07-26 RX ADMIN — IPRATROPIUM BROMIDE AND ALBUTEROL SULFATE SCH ML: .5; 3 SOLUTION RESPIRATORY (INHALATION) at 14:38

## 2018-07-26 NOTE — DISCHARGE SUMMARY-HOSPITALIST
Diagnosis/Chief Complaint


Date of Admission


2018 at 5:00 pm


Date of Discharge





Discharge Date:  2018


Admission Diagnosis


NSTEMI


Discharge Diagnosis





(1) Pneumonia


Status:  Acute


Assessment & Plan:  Cefdinir


No sepsis criteria





(2) Emphysema/COPD


Status:  Chronic


Assessment & Plan:  On 2lpm at baseline


   Up to 8lpm


Just completed course of abx and prednisone per PCP


Discussed with Dr Snyder


Continue Abx as above and start steroids





(3) NSTEMI (non-ST elevated myocardial infarction)


Status:  Acute


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


Continue Plavix and ASA





(4) Essential (primary) hypertension


Status:  Chronic


Assessment & Plan:  Continue home meds





(5) Hypothyroidism


Status:  Chronic


Assessment & Plan:  Continue home meds





(6) Atrial fibrillation


Assessment & Plan:  Continue Home diltiazem and metoprolol


Continue Xarelto





(7) Prophylactic measure


Assessment & Plan:  HH diet


Xarelto


Saline lock








Discharge Summary


Procedures/Consulations


Dr Snyder- Pulm


Dr Jordan- Cardiology





Discharge Physical Exam


Allergies:  


Coded Allergies:  


     Sulfa (Sulfonamide Antibiotics) (Unverified  Allergy, Unknown, 18)


Vitals & I&Os





Vital Signs








  Date Time  Temp Pulse Resp B/P (MAP) Pulse Ox O2 Delivery O2 Flow Rate FiO2


 


18 08:00 97.2 78 18 128/59 (82) 93 Nasal Cannula 7.00 








General Appearance:  Alert, Cooperative


Respiratory:  Clear to Auscultation


Cardiovascular:  Regular Rate





Hospital Course


Pt was admitted for NSTEMI and underwent cardiac cath with stent placement. She 

subsequently developed increasing oxygen demand and was found to have 

pneumonia. She was started on antibiotics but was unable to titrate back to her 

baseline oxygen requirement of 2lpm. She was discharged to swing bed for 

further skilled therapy and oxygen titration.


Labs (last 24 hrs)


Laboratory Tests


18 05:33: 


White Blood Count 11.3H, Red Blood Count 3.68L, Hemoglobin 9.6L, Hematocrit 31L

, Mean Corpuscular Volume 84, Mean Corpuscular Hemoglobin 26, Mean Corpuscular 

Hemoglobin Concent 31L, Red Cell Distribution Width 16.4H, Platelet Count 261, 

Mean Platelet Volume 12.2H, Neutrophils (%) (Auto) 93H, Lymphocytes (%) (Auto) 

5L, Monocytes (%) (Auto) 1, Eosinophils (%) (Auto) 0, Basophils (%) (Auto) 0, 

Neutrophils # (Auto) 10.6H, Lymphocytes # (Auto) 0.6L, Monocytes # (Auto) 0.2, 

Eosinophils # (Auto) 0.0, Basophils # (Auto) 0.0, Neutrophils % (Manual) 95, 

Lymphocytes % (Manual) 4, Monocytes % (Manual) 1, Sodium Level 138, Potassium 

Level 5.5H, Chloride Level 106, Carbon Dioxide Level 25, Anion Gap 7, Blood 

Urea Nitrogen 35H, Creatinine 1.32H, Estimat Glomerular Filtration Rate 38, BUN/

Creatinine Ratio 27, Glucose Level 147H, Calcium Level 9.1


Patient resulted labs reviewed.


Pending Labs





Imaging:  Reviewed Imaging Report





Discussion & Recommendations


Discharge Planning:  >30 minutes discharge planning





Discharge


Home Medications:





Active Scripts


Active


Reported


Acetaminophen 325 Mg Tablet 325-650 Mg PO Q4H PRN


Incruse Ellipta (Umeclidinium Bromide) 62.5 Mcg Blst.w.dev 1 Puff IH DAILY


Metoclopramide HCl 5 Mg Tablet 5 Mg PO BIDAC


     FILLED #60 18


Advil (Ibuprofen) 200 Mg Tablet 200 Mg PO Q6H PRN


Alprazolam 0.25 Mg Tablet 0.25 Mg PO HS


Cyanocobalamin Injection (Cyanocobalamin) 1,000 Mcg/Ml Inj 1,000 Mcg INJ MONTHLY


Magnesium Oxide 400 Mg Tablet 400 Mg PO DAILY


Benzonatate 100 Mg Capsule 100 Mg PO Q6H PRN


Xarelto (Rivaroxaban) 15 Mg Tablet 15 Mg PO DAILY


Montelukast Sodium 10 Mg Tablet 10 Mg PO DAILY


Levothyroxine Sodium 88 Mcg Tablet 88 Mcg PO DAILY


Omeprazole 40 Mg Capsule.dr 40 Mg PO DAILY PRN


Levocetirizine Dihydrochloride 5 Mg Tablet 5 Mg PO DAILY PRN


Albuterol Sulfate 2.5 Mg/3 Ml Vial.neb 2.5 Mg NEB Q6H PRN


Breo Ellipta 100-25 Mcg INH (Fluticasone/Vilanterol) 1 Each Blst.w.dev 1 Puff 

IH DAILY





Instructions to patient/family


Please see electronic discharge instructions given to patient.





Clinical Quality Measures


End of Life/Advance Care Plan:


Advance Care discuss with:  patient, family member (s)


Plan:  initiate discussion, clarifying prognosis


Time spent on discussion(mins):  17





DVT/VTE Risk/Contraindication:


Risk Factor Score Per Nursin


RFS Level Per Nursing on Admit:  2=Moderate





Copy


Copies To 1:   DASHAWN YOUNG DO





Problem Qualifiers





(1) Pneumonia:  


Pneumonia type:  due to unspecified organism  Laterality:  left  Lung location:

  unspecified part of lung  Qualified Codes:  J18.9 - Pneumonia, unspecified 

organism


(2) Emphysema/COPD:  


Emphysema type:  unspecified  Qualified Codes:  J43.9 - Emphysema, unspecified


(3) Hypothyroidism:  


Hypothyroidism type:  unspecified  Qualified Codes:  E03.9 - Hypothyroidism, 

unspecified


(4) Atrial fibrillation:  


Atrial fibrillation type:  paroxysmal  Qualified Codes:  I48.0 - Paroxysmal 

atrial fibrillation








GERMAN GRADY MD 2018 08:25

## 2018-07-26 NOTE — OCCUPATIONAL THERAPY EVAL
OT Evaluation-General/PLF


Medical Diagnosis


Admission Date


2018 at 09:47


Medical Diagnosis:  NSTEMI


Onset Date:  2018





Therapy Diagnosis


Therapy Diagnosis:  decr self care, decr funct mob, decr act inocente, weakness





Height/Weight


Height (Feet):  5


Height (Inches):  1.00


Weight (Pounds):  109


Weight (Ounces):  8.0





Precautions


Precautions/Isolations:  Fall Prevention, Standard Precautions


Safety Interventions:  Reorient-Attempt, Reorient-PRN





Referral


Physician:  Garry


Referral Reason:  Evaluation/Treatment





Medical History


Pertinent Medical History:  Atrial Fib, COPD, DM, HTN, Hypothroidism, Renal 

Insufficiency, Smoking


Additional Medical History


Smoker until 2017. Lumpectomy. Kidney cancer. Thyroidectomy. Chronic O2 at home 

2-3L. osteoporosis, chronic back pain. [place]


Current History


Admitted through ED with cest pain. Cardiac stent put in 18. Also has 

pneumonia


Reviewed History:  Yes





Social History


Home:  Single Level


Current Living Status:  Alone





ADL-Prior Level of Function


ADL PLOF Comments


Pt reported that she has been able to manage all of her basic self care needs 

and care for her home, including laundry, cleaning, simple meals. She drove 

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 She is retired from [employer]


DME/Equipment:  Bath Bench, Bedside Commode (over toilet), Shower, Shower Hose 

Extender, Tub/Shower





OT Current Status


Subjective


Pt seen in room, up in bed, agreeable to OT. No pain mentioned. Hopes to go 

home soon





Appearance


Alert, cooperative





Mental Status/Objective


Patient Orientation:  Person, Place, Time, Situation


Attachments:  Oxygen, Saline Lock, Telemetry





Current


Glasses/Contacts:  Yes


Hearing Aids:  No


Dentures/Partials:  No


Hand Dominance:  Right


Upper Extremity ROM


Grossly WFL bilat


Upper Extremity Sensation


No problems, per pt report


Upper Extremity Strength


Grossly 4/5 bilat





ADL-Treatment


ADL-Current


Pt was able to get up from bed with SBA and walk with SBA, FWW to bathroom, 

watching O2 cord herself. She stood at the sink to brush her teeth, SBA, FWW 

and toileted with SBA, tall toilet, grab bar, FWW. She returned to bed SBA, FWW 

and manage O2. She worked at steady pace and rested during activity if needed. 

O2 at 8L/min nc. Pt left up in bed, O2 in place, visiting her friend


              Functional [place] Measure


0=Not Assessed/NA   4=Minimal Assistance


1=Total Assistance   5=Supervision or Setup


2=Maximal Assistance   6=Modified [place]


3=Moderate Assistance   7=Complete IndependenceIRFPAI Quality Coding Scale











6 Independent with activity with or without an assistive device


 


5  Patient requires set up or clean up by helper.  Patient completes activity  

by  themselves


 


4 Supervision or touching assist (CGA). [place] provide cues , steadying assist


 


3 The helper provides less than half the effort to complete the activity


 


2 The helper provides more than half the effort to complete the activity


 


1 Dependent.  The helper does all the effort to complete an activity 


 


7 Patient refused to complete or attempt activity


 


9 The patient did not perform the activity before the current illness or injury


 


88 Not attempted due to Medical conditions or safety concerns








Eating (FIM):  7 (per pt report, able to open packages, cut up food, feed 

herself, get a drink)


Eating (QC):  6


Grooming (FIM):  5 (SBA at sink to brush teeth, wash hands, FWW)


Oral Hygiene (QC):  4 (SBA)


Toileting (FIM):  5 (SBA, including hygiene and clothing management. Changed 

pad in underwear herself. )


Toileting Hygiene (QC):  4 (SBA)


Toilet/Commode Transfer (FIM):  5 (SBA, tall toilet, grab bar, FWW)


Toilet Transfer (QC):  4 (SBA)





Education


OT Patient Education:  Modified ADL techniques, Progress toward Goal/Update tx 

plan, Purpose of tx/functional activities, Safety issues, Transfer techniques


Teaching Recipient:  Patient


Teaching Methods:  Discussion


Response to Teaching:  Verbalize Understanding, Return Demonstration





OT Long Term Goals


Long Term Goals


Time Frame:  Aug 10, 2018


Eating (FIM):  6


Eating (QC):  6


Groomin


Oral Hygiene (QC):  6


Bathing(FIM):  5


Upper Body Dressing(FIM):  6


Lower Body Dressing(FIM):  6


Toileting(FIM):  6


Toileting Hygiene (QC):  6


Toilet/Commode Transfer(FIM):  6


Toilet/Commode Transfer (QC):  6


Shower Transfer(FIM):  5


Additional Goals:  1-Demonstrate ADL Tasks, 2-Verbalize Understanding, 3-

ImproveStrength/Jennifer


1=Demonstrate adherence to instructed precautions during ADL tasks.


2=Patient will verbalize/demonstrate understanding of assistive devices/

modifications for ADL.


3=Patient will improve strength/tolerance for activity to enable patient to 

perform ADL's.





OT Education/Plan


Problem List/Assessment


Assessment:  Decreased Activ Tolerance, Decreased UE Strength, Dependent 

Transfers, Impaired Self-Care Skills


Pt would benefit from skilled OT to increase her independence in basic self car 

to allow her to safely return home.





Discharge Recommendations


Plan/Recommendations:  Continue POC





Treatment Plan/Plan of Care


Treatment,Training & Education:  Yes


Patient would benefit from OT for education, treatment and training to promote 

independence in ADL's, mobility, safety and/or upper extremity function for ADL'

s.


Plan of Care:  ADL Retraining, Functional Mobility, UE Funct Exercise/Act, UE 

Neuromus Re-Ed/Coord, OTHER (energy conservation educaiton)


Treatment Duration:  Aug 10, 2018


Frequency:  5 times per week


Estimated Hrs Per Day:  .5 hour per day


Agreement:  Yes


Rehab Potential:  Good





Time/GCodes


Start Time:  15:35


Stop Time:  16:03


Total Time Billed (hr/min):  28


Billed Treatment Time


visit, 10 minutes evaluation moderate intensity, 18 minutes ADL











MARK ANTHONY KERN OT 2018 16:14

## 2018-07-26 NOTE — XMS REPORT
Clinical Summary

 Created on: 2018



Jigna Herring

External Reference #: [id number]

: 1931

Sex: Female



Demographics







 Address  [address] [place]  [address]

 

 Home Phone  [phone]

 

 Preferred Language  English

 

 Marital Status  Unknown

 

 Protestant Affiliation  MET

 

 Race  White

 

 Ethnic Group  Not  or 





Author







 Author  [facility]

 

 Organization  [facility]

 

 Address  Unknown

 

 Phone  Unavailable







Support







 Name  Relationship  Address  Phone

 

 Jessie Guerra  ECON  Unknown  [phone]

 

 Jenny Guerra  ECON  Unknown  [phone]







Care Team Providers







 Care Team Member Name  Role  Phone

 

 Cody Rai  PCP  Unavailable







Source Comments

Some departments are not documenting in the electronic medical record.  If you 
do not see the information that you expected, contact Release of Information in 
the Health Information Management department at [phone] for further 
assistance in locating additional records.[facility]



Allergies







    



  Active Allergy   Reactions   Severity   Noted Date   Comments

 

    



  Seasonal Allergies   RHINITIS   Low   10/13/2016 

 

    



  Sulfa (Sulfonamide   UNKNOWN   Low   10/13/2016 



  Antibiotics)    







Current Medications







      



  Prescription   Sig.   Disp.   Refills   Start   End Date   Status



      Date  

 

      



  levothyroxine (SYNTHROID)   Take 88 mcg by mouth       Active



  88 mcg tablet   daily 30 minutes before     



   breakfast.     

 

      



  omeprazole DR(+)   Take 40 mg by mouth daily       Active



  (PRILOSEC) 40 mg capsule   before breakfast.     

 

      



  amLODIPine (NORVASC) 5 mg   Take 5 mg by mouth every       Active



  tablet   morning.     

 

      



  ALPRAZolam (XANAX) 0.25   Take 0.25 mg by mouth at       Active



  mg tablet   bedtime daily.     

 

      



  cholecalciferol (VITAMIN   Take 1,000 Units by mouth       Active



  D-3) 1,000 units tablet   daily.     

 

      



  cyanocobalamin (VITAMIN   Inject 1 mL into the       Active



  B-12, RUBRAMIN) 1,000   muscle every 30 days.     



  mcg/mL injection      

 

      



  fish oil /omega-3 fatty   Take 1 Cap by mouth       Active



  acids (SEA-OMEGA)   daily.     



  340/1000 mg capsule      

 

      



  atenolol (TENORMIN) 25 mg   Take 25 mg by mouth every       Active



  tablet   morning.     

 

      



  guaiFENesin LA (MUCINEX)   Take 600 mg by mouth       Active



  600 mg tablet   daily.     

 

      



  ibuprofen (ADVIL) 200 mg   Take 200 mg by mouth       Active



  tablet   every 6 hours as needed     



   for Pain. Take with food.     

 

      



  fluticasone (FLONASE) 50   Apply 1 Spray to each       Active



  mcg/actuation nasal spray   nostril as directed daily     



   as needed. Shake bottle     



   gently before using.     

 

      



  albuterol 0.5%   Inhale 2.5 mg solution by       Active



  (PROVENTIL; VENTOLIN) 2.5   nebulizer as directed     



  mg/0.5 mL nebulizer   every 6 hours as needed     



  solution   for Shortness of Breath     



   or Wheezing.     

 

      



  budesonide/formoterol   Inhale 1-2 Puffs by mouth       Active



  (SYMBICORT HFA) 160/4.5   into the lungs twice     



  mcg inhalation   daily.     

 

      



  meloxicam (MOBIC) 7.5 mg   Take 7.5 mg by mouth       Active



  tablet   daily as needed for Pain.     

 

      



  traMADol (ULTRAM) 50 mg   Take 1 Tab by mouth every   30 Tab   0   20  
  Active



  tablet   6 hours as needed for     16  



   Pain.     

 

      



  hyoscyamine (ANASPAZ;   Place 1 Tab under tongue   30 Tab   3   20    
Active



  NULEV; SYMAX FASTABS;   every 4 hours as needed.     16  



  HYOMAX-FT; ED-SPAZ;      



  OSCIMIN) 0.125 mg rapid      



  dissolve tablet      







Active Problems







 



  Problem   Noted Date

 

 



  Panlobular emphysema (HCC)   2016

 

 



  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

 

 



  Urothelial cancer ([facility])   10/13/2016

 

 



  Overview:



  *Patient was taken for cystoscopy and biopsy 16, with pathology



  revealed:



  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



  - No invasion of the observed subpithelial connective tissue is detected



  *Cytology at the time of biopsy also positive for urothelial cell



  carcinoma.





  16 OPERATIVE PROCEDURE: Right robotic-assisted laparoscopic



  nephroureterectomy with bladder cuff





  Pathology:



  A. Kidney and ureter, "right kidney and ureter", nephroureterectomy:



  Kidney: Noninvasive low grade papillary urothelial carcinoma, Greatest



  dimension: 2.8 cm



  Pathologic Staging (pTNM): pTa Nx Mx





  Last Assessment & Plan:



  Needs cystoscopy in 3 months ~ pt prefers to do closer to home, she will



  call to arrange with Dr Collins



  Will need routine FU cystoscopy every 3 months





  Copy of pathology report given to pt





  Okay to return to work when she is ready, no restrictions





  Discussed increased risks of recurrence with smoking, advised smoking



  cessation







Family History







   



  Medical History   Relation   Name   Comments

 

   



  Cancer   Other  









   



  Relation   Name   Status   Comments

 

   



  Other   







Social History







    



  Tobacco Use   Types   Packs/Day   Years Used   Date

 

    



  Current Every Day Smoker   Cigarettes   1   65 

 

    



  Smokeless Tobacco: Never   



  Used   









 Comments: down to 3/4 pack/day at present









   



  Alcohol Use   Drinks/Week   oz/Week   Comments

 

   



  No   









 



  Sex Assigned at Birth   Date Recorded

 

 



  Not on file 







Last Filed Vital Signs







  



  Vital Sign   Reading   Time Taken

 

  



  Blood Pressure   155/72   2017  1:02 PM CST

 

  



  Pulse   67   2017  1:02 PM CST

 

  



  Temperature   36.7 C (98 F)   2016  8:32 AM CST

 

  



  Respiratory Rate   -   -

 

  



  Oxygen Saturation   92%   2016  9:26 AM CST

 

  



  Inhaled Oxygen   -   -



  Concentration  

 

  



  Weight   45.5 kg (100 lb 6.4 oz)   2017  1:02 PM CST

 

  



  Height   154.9 cm (5' 1")   2017  1:02 PM CST

 

  



  Body Mass Index   18.97   2017  1:02 PM CST







Plan of Treatment







   



  Health Maintenance   Due Date   Last Done   Comments

 

   



  PHYSICAL (COMPREHENSIVE)   1938  



  EXAM   

 

   



  PERTUSSIS VACCINE   1942  

 

   



  TETANUS VACCINE   1948  

 

   



  SHINGLES RECOMBINANT   1981  



  VACCINE (1 of 2)   

 

   



  OSTEOPOROSIS SCREENING   1996  

 

   



  PNEUMONIA (PCV13/PPSV23)   1996  



  VACCINES (1 of 2 - PCV13)   

 

   



  INFLUENZA VACCINE   10/01/2018  







Results

Not on filefrom Last 3 Months

## 2018-07-26 NOTE — PHYSICAL THERAPY EVALUATION
PT Evaluation-General


Medical Diagnosis


Admission Date


2018 at 09:47


Medical Diagnosis:  NSTEMI


Onset Date:  2018





Therapy Diagnosis


Therapy Diagnosis:  generalized debility





Height/Weight


Height (Feet):  5


Height (Inches):  1.00


Weight (Pounds):  109


Weight (Ounces):  8.0





Precautions


Precautions/Isolations:  Fall Prevention, Standard Precautions





Weight Bear Status


Right Lower Extremity:  Right


Weight Bearing/Tolerated


Left Lower Extremity:  Left


Weight Bearing/Tolerated





Referral


Physician:  Garry


Reason for Referral:  Evaluation/Treatment





Medical History


Pertinent Medical History:  Atrial Fib, COPD, DM, HTN, Hypothroidism, Renal 

Insufficiency, Smoking


Current History


transfer to [facility] status


Reviewed History:  Yes





Social History


Home:  Single Level


Current Living Status:  Alone





Prior/Core FIM


Prior Level of Function


              Functional [place] Measure


0=Not Assessed/NA   4=Minimal Assistance


1=Total Assistance   5=Supervision or Setup


2=Maximal Assistance   6=Modified [place]


3=Moderate Assistance   7=Complete [place]


Bed Mobility:  6


Transfers (B,C,W/C) (FIM):  6


Gait:  6


FWW and O2 at home PLOF





PT Evaluation-Current


Subjective


Patient agrees to PT.





Pain





   Numeric Pain Scale:  0-No Pain


   Location:  No Pain Reported





Objective


Patient Orientation:  Person, Time, Situation


Problem Solving:  Fair


Attachments:  Oxygen (6-8L HF NC)





ROM/Strength


ROM Lower Extremities


bilateral LE WNL


Strenght Lower Extremities


4-/5 grossly bilaterally





Integumentary/Posture


Integumentary


refer to nursing notes


Bowel Incontinence:  No


Bladder Incontinence:  Yes


Posture


kyphotic





Neuromuscular


(Tone, Coordination, Reflexes)


grossly intact





Sensory


Vision:  Wears Glasses


Hearing:  Impaired


Sensation Right Lower Extremit:  Impaired


Sensation Left Lower Extremity:  Impaired





Transfers


              Functional [place] Measure


0=Not Assessed/NA   4=Minimal Assistance


1=Total Assistance   5=Supervision or Setup


2=Maximal Assistance   6=Modified [place]


3=Moderate Assistance   7=Complete [place]


Transfers (B, C, W/C) (FIM):  5


Scootin


Rollin


Supine to/from Sit:  5


Sit to/from Stand:  5


Sit to Lying (QC):  5


Lying to Sitting/Side of Bed(Q:  5


Sit to Stand (QC):  5


Chair/Bed-to-Chair Xfer(QC):  5





Gait


Does the Patient Walk?:  Yes


Mode of Locomotion:  Walk


Anticipated Mode of Locomotion:  Walk


Gait (FIM):  5


Distance (FIM):  3=150 ft


Distance:  200'


Walk 50 ft with 2 Turns(QC):  5


Walk 150 ft (QC):  5


Gait Level of Assist:  5


Gait Assistive Device:  FWW


Comments/Gait Description


functional gait sequence/assist for O2 tank/noted increase SOA with activity on 

8L O2 HF.





Wheelchair Training


Does the Pt Use a Wheelchair?:  No





Balance


Sitting Static:  Normal


Sitting Dynamic:  Normal


Standing Static:  Normal


 Standing Dynamic:  Normal





Treatment


Ambulate with FWW SBA x 200' with O2 8L NC in place with 3 standing recovery 

periods due to SOA.  Patient incontinent urine and required assist to change 

and cleanse.  Patient on toilet for BM with call light in hand.





Assessment/Needs


Rehab Potential:  Guarded


Post Rehab Potential-Barriers:  decreased pulmonary function





PT Long Term Goals


Long Term Goals


PT Long Term Goals Time Frame:  Aug 10, 2018


Transfers (B,C,W/C) (FIM):  6


Sit to Lying (QC):  6


Lying-Sitting on Side/Bed(QC):  6


Sit to Stand (QC):  6


Rollin


Chair/Bed-to-Chair Xfer(QC):  6


Does the Patient Walk:  Yes


Gait (FIM):  6


Gait distance (FIM):  3=150 ft


Distance:  250'


Walk 50ft with 2 Turns (QC):  6


Walk 150 ft (QC):  6


Gait Level of Assist:  6


Gait Assistive Device:  FWW





PT Plan


Problem List


Problem List:  Activity Tolerance, Functional Strength, Safety





Treatment/Plan


Treatment Plan:  Continue Plan of Care


Treatment Plan:  Bed Mobility, Education, Functional Activity Jennifer, Functional 

Strength, Gait, Safety, Therapeutic Exercise


Treatment Duration:  Aug 10, 2018


Frequency:  6 times per week


Estimated Hrs Per Day:  .25 hour per day


Patient and/or Family Agrees t:  Yes





Safety Risks/Education


Patient Education:  Safety Issues


Teaching Recipient:  Patient


Teaching Methods:  Discussion


Response to Teaching:  Verbalize Understanding





Discharge Recommendations


Therapy D/C Recommendations:  Home w/ Family Support, Nursing Home Placement, 

Skilled Nursing (TCU/NH)





Time/GCodes


Time In:  943


Time Out:  1006


Total Billed Treatment Time:  23


Total Billed Treatment


1 visit


EVModC 8 min


FA 15 min











MELIDA COOPER PT 2018 10:43

## 2018-07-26 NOTE — CARDIOLOGY PROGRESS NOTE
Cardiology SOAP Progress Note


Subjective:


Breathing improved.





Objective:


I&O/Vital Signs











 7/26/18 7/26/18 7/26/18





 09:00 10:50 14:38


 


Pulse Ox 90 90 90


 


O2 Delivery High Flow N/C High Flow N/C High Flow N/C


 


O2 Flow Rate 7.00 7.00 7.00








Weight (Pounds):  109


Weight (Ounces):  8.0


Weight (Calculated Kilograms):  49.539147


Constitutional:  No appears stated age; AAO x 3; No apparent distress, No PERRL

, No well-developed, No well-nourished, No other


Respiratory:  No accessory muscle use, No respiratory distress, No chest tender

, No chest expansion is symmetric; chest is bilaterally symmetric; No lungs 

clear to percussion; lungs clear to auscultation; No crackles, No rhonchi, No 

rales, No stridor, No wheezing, No pleural rub, No other


Cardiovascular:  regular rate-rhythm; No irregularly irregular, No extra beats, 

No parasternal heave is noted, No JVD, No edema, No bradycardia, No tachycardia

, No point of maximal impulse, No cardiac thrills are palpable; S1 and S2; No 

gallop/S3, No gallop/S4, No diastolic murmur, No systolic murmur, No friction 

rub, No click, No other


Gastrointestional:  No tender, No soft, No round, No distended, No pulsatile 

mass, No organomegaly, No guarding, No rebound, No tenderness, No hernia, No 

mass, No audible bowel sounds, No abnormal bowel sounds, No abdominal bruits, 

No spleenomegaly, No other


Extremities:  No normal range of motion, No non-tender, No normal inspection, 

No pedal edema, No calf tenderness, No normal capillary refill, No pelvis stable

, No calf tenderness, No inflammation, No pedal edema, No slow capillary refill

, No swelling, No other, No abrasion, No clubbing, No cyanosis, No ecchymosis, 

No laceration, No no lower extremity edema bilateral, No significant edema, No 

tenderness, No wound


Neurologic/Psychiatric:  No CNs II-XII nml as tested; no motor/sensory deficits

, alert, normal mood/affect, oriented x 3; No abnormal cerebellar tests, No 

abnormal CNs II-XII, No abnormal gait, No aphasia, No EOM palsy, No facial droop

, No motor weakness, No sensory deficit, No depressed affect, No disoriented x 3

, No other, No grossly intact, No power is 5/5 both on sides


Skin:  No normal color, No warm/dry, No cyanosis, No cool, No diaphoresis, No 

damp, No ecchymosis, No jaundice, No mottled, No pallor, No rash, No tattoos/

piercings, No ulcerations, No rash on exposed areas, No ulcerations on exposed 

areas, No other





A/P:


Assessment/Dx:


Non-STEMI,


Paroxysmal atrial fibrillation,


Hypothyroidism,


Anemia,


Hypoxia, pneumonia


.


Plan:





Non-STEMI, positive troponin.  EKG did not show any ST deviation.  Aspirin, 

Plavix, Lovenox.  Continue metoprolol, lisinopril.  We'll start low-dose 

Lipitor.  Coronary angiography on 7/23/2018 showed moderate mid LAD stenosis 

and subtotal occlusion of ostial RCA which was treated successfully with drug-

eluting stent.  Patient will continue on aspirin, Plavix.





Hypoxia, pneumonia: On IV antibiotics. improved but still requiring 7 Liters of 

O2 and O2 saturation is early 90s. Defer to Dr. Castañeda, Dr. Snyder.





Echocardiogram showed normal LV function with no severe valvular heart disease.





Paroxysmal atrial fibrillation, continue Xarelto and rate controlling agent.





Hypothyroidism, continue levothyroxine.





Anemia, deferred to Dr. Castañeda








Thank you for your consultation. Please call me if you have any questions.








AIDAN Jordan MD, FACP, FACC, FSCAI, FHRS, CCDS


Interventional Cardiology


Cardiac Electrophysiology


Vascular Medicine and Endovascular Interventions











MARITZA JORDAN MD Jul 26, 2018 5:35 pm

## 2018-07-27 VITALS — SYSTOLIC BLOOD PRESSURE: 131 MMHG | DIASTOLIC BLOOD PRESSURE: 61 MMHG

## 2018-07-27 VITALS — DIASTOLIC BLOOD PRESSURE: 65 MMHG | SYSTOLIC BLOOD PRESSURE: 141 MMHG

## 2018-07-27 RX ADMIN — RIVAROXABAN SCH MG: 15 TABLET, FILM COATED ORAL at 16:27

## 2018-07-27 RX ADMIN — Medication SCH MG: at 09:09

## 2018-07-27 RX ADMIN — ALPRAZOLAM SCH MG: 0.25 TABLET ORAL at 20:11

## 2018-07-27 RX ADMIN — IPRATROPIUM BROMIDE AND ALBUTEROL SULFATE SCH ML: .5; 3 SOLUTION RESPIRATORY (INHALATION) at 20:40

## 2018-07-27 RX ADMIN — IPRATROPIUM BROMIDE AND ALBUTEROL SULFATE SCH ML: .5; 3 SOLUTION RESPIRATORY (INHALATION) at 10:11

## 2018-07-27 RX ADMIN — METOPROLOL TARTRATE SCH MG: 50 TABLET, FILM COATED ORAL at 20:12

## 2018-07-27 RX ADMIN — RISPERIDONE SCH MG: 0.25 TABLET, FILM COATED ORAL at 09:09

## 2018-07-27 RX ADMIN — IPRATROPIUM BROMIDE AND ALBUTEROL SULFATE SCH ML: .5; 3 SOLUTION RESPIRATORY (INHALATION) at 14:17

## 2018-07-27 RX ADMIN — CLOPIDOGREL BISULFATE SCH MG: 75 TABLET, FILM COATED ORAL at 09:09

## 2018-07-27 RX ADMIN — CEFDINIR SCH MG: 300 CAPSULE ORAL at 20:11

## 2018-07-27 RX ADMIN — DILTIAZEM HYDROCHLORIDE SCH MG: 180 CAPSULE, COATED, EXTENDED RELEASE ORAL at 09:09

## 2018-07-27 RX ADMIN — LISINOPRIL SCH MG: 5 TABLET ORAL at 09:09

## 2018-07-27 RX ADMIN — ASPIRIN SCH MG: 81 TABLET ORAL at 09:09

## 2018-07-27 RX ADMIN — PANTOPRAZOLE SODIUM SCH MG: 40 TABLET, DELAYED RELEASE ORAL at 06:32

## 2018-07-27 RX ADMIN — CEFDINIR SCH MG: 300 CAPSULE ORAL at 09:09

## 2018-07-27 RX ADMIN — FLUTICASONE PROPIONATE AND SALMETEROL XINAFOATE PRN PUFF: 115; 21 AEROSOL, METERED RESPIRATORY (INHALATION) at 20:40

## 2018-07-27 RX ADMIN — FLUTICASONE PROPIONATE AND SALMETEROL XINAFOATE PRN PUFF: 115; 21 AEROSOL, METERED RESPIRATORY (INHALATION) at 06:59

## 2018-07-27 RX ADMIN — RISPERIDONE SCH MG: 0.25 TABLET, FILM COATED ORAL at 20:11

## 2018-07-27 RX ADMIN — LEVOTHYROXINE SODIUM SCH MCG: 0.09 TABLET ORAL at 06:32

## 2018-07-27 RX ADMIN — METOPROLOL TARTRATE SCH MG: 50 TABLET, FILM COATED ORAL at 09:09

## 2018-07-27 RX ADMIN — ATORVASTATIN CALCIUM SCH MG: 20 TABLET, FILM COATED ORAL at 20:11

## 2018-07-27 RX ADMIN — MONTELUKAST SCH MG: 10 TABLET, FILM COATED ORAL at 09:09

## 2018-07-27 RX ADMIN — IPRATROPIUM BROMIDE AND ALBUTEROL SULFATE SCH ML: .5; 3 SOLUTION RESPIRATORY (INHALATION) at 06:58

## 2018-07-27 NOTE — PHYSICAL THERAPY DAILY NOTE
PT Daily Note-Current


Subjective


Patient reports [birth year] [age] [birth year].  She agrees to PT.





Pain





   Numeric Pain Scale:  0-No Pain


   Location:  No Pain Reported





Mental Status


Patient Orientation:  Normal For Age


Attachments:  Oxygen (6-8L O2 HF)





Transfers


              Functional [place] Measure


0=Not Assessed/NA   4=Minimal Assistance


1=Total Assistance   5=Supervision or Setup


2=Maximal Assistance   6=Modified [place]


3=Moderate Assistance   7=Complete IndependenceIRFPAI Quality Coding Scale











6 Independent with activity with or without an assistive device


 


5  Patient requires set up or clean up by helper.  Patient completes activity  

by  themselves


 


4 Supervision or touching assist (CGA). [place] provide cues , steadying assist


 


3 The helper provides less than half the effort to complete the activity


 


2 The helper provides more than half the effort to complete the activity


 


1 Dependent.  The helper does all the effort to complete an activity 


 


7 Patient refused to complete or attempt activity


 


9 The patient did not perform the activity before the current illness or injury


 


88 Not attempted due to Medical conditions or safety concerns








Transfers (B, C, W/C) (FIM):  6


Scootin


Roll Left to Right (QC):  6


Supine to/from Sit:  6


Sit to/from Stand:  6


Sit to Lying (QC):  6


Sit to Stand (QC):  6





Weight Bearing


Right Lower Extremity:  Right


Weight Bearing/Tolerated


Left Lower Extremity:  Left


Weight Bearing/Tolerated





Gait Training


Does the Patient Walk?:  Yes


Gait (FIM):  6


Distance (FIM):  3=150 ft


Distance:  300'


Walk 50 ft with 2 Turns(QC):  6


Walk 150 ft (QC):  6


Gait Level of Assist:  6


Gait Assistive Device:  FWW


safe and functional with mobility





Assessment


Patient tolerated treatment well and returned to bed with needs met.  Patient 

SAO2 on 7L O2 HF with activity remained >90% with no SOA.  PT to increase 

activity as tolerated by patient.





PT Long Term Goals


Long Term Goals


PT Long Term Goals Time Frame:  Aug 10, 2018


Transfers (B,C,W/C) (FIM):  6


Sit to Lying (QC):  6


Lying-Sitting on Side/Bed(QC):  6


Sit to Stand (QC):  6


Rollin


Chair/Bed-to-Chair Xfer(QC):  6


Does the Patient Walk:  Yes


Gait (FIM):  6


Gait distance (FIM):  3=150 ft


Distance:  250'


Walk 50ft with 2 Turns (QC):  6


Walk 150 ft (QC):  6


Gait Level of Assist:  6


Gait Assistive Device:  FWW





PT Plan


Treatment/Plan


Treatment Plan:  Continue Plan of Care


Treatment Plan:  Bed Mobility, Education, Functional Activity Jennifer, Functional 

Strength, Gait, Safety, Therapeutic Exercise


Treatment Duration:  Aug 10, 2018


Frequency:  6 times per week


Estimated Hrs Per Day:  .25 hour per day


Patient and/or Family Agrees t:  Yes





Time/GCodes


Time In:  952


Time Out:  1007


Total Billed Treatment Time:  15


Total Billed Treatment


1 visit


FA 15 min











MELIDA COOPER PT 2018 10:18

## 2018-07-27 NOTE — CARDIOLOGY PROGRESS NOTE
Cardiology SOAP Progress Note


Subjective:


still shortness of breath.





Objective:


I&O/Vital Signs











 7/27/18 7/27/18 7/27/18 7/27/18





 06:32 07:00 07:01 08:00


 


Temp 97.6   


 


Pulse 67   


 


Resp 17   


 


B/P (MAP) 131/61 (84)   


 


Pulse Ox 94 92  


 


O2 Delivery Nasal Cannula High Flow N/C  High Flow N/C


 


O2 Flow Rate 7.00 7.00 7.00 7.00


 


    





 7/27/18 7/27/18 7/27/18 





 10:11 14:17 17:20 


 


Temp   97.0 


 


Pulse   56 


 


Resp   17 


 


B/P (MAP)   141/65 (90) 


 


Pulse Ox 91 92 93 


 


O2 Delivery High Flow N/C High Flow N/C Nasal Cannula 


 


O2 Flow Rate 7.00 7.00 7.00 














 7/27/18





 00:00


 


Intake Total 860 ml


 


Balance 860 ml








Weight (Pounds):  109


Weight (Ounces):  8.0


Weight (Calculated Kilograms):  49.152781


Constitutional:  No appears stated age; AAO x 3; No apparent distress, No PERRL

, No well-developed, No well-nourished, No other


Respiratory:  No accessory muscle use, No respiratory distress, No chest tender

, No chest expansion is symmetric; chest is bilaterally symmetric; No lungs 

clear to percussion; lungs clear to auscultation; No crackles, No rhonchi, No 

rales, No stridor, No wheezing, No pleural rub, No other


Cardiovascular:  regular rate-rhythm; No irregularly irregular, No extra beats, 

No parasternal heave is noted, No JVD, No edema, No bradycardia, No tachycardia

, No point of maximal impulse, No cardiac thrills are palpable; S1 and S2; No 

gallop/S3, No gallop/S4, No diastolic murmur, No systolic murmur, No friction 

rub, No click, No other


Gastrointestional:  No tender, No soft, No round, No distended, No pulsatile 

mass, No organomegaly, No guarding, No rebound, No tenderness, No hernia, No 

mass, No audible bowel sounds, No abnormal bowel sounds, No abdominal bruits, 

No spleenomegaly, No other


Extremities:  No normal range of motion, No non-tender, No normal inspection, 

No pedal edema, No calf tenderness, No normal capillary refill, No pelvis stable

, No calf tenderness, No inflammation, No pedal edema, No slow capillary refill

, No swelling, No other, No abrasion, No clubbing, No cyanosis, No ecchymosis, 

No laceration, No no lower extremity edema bilateral, No significant edema, No 

tenderness, No wound


Neurologic/Psychiatric:  No CNs II-XII nml as tested; no motor/sensory deficits

, alert, normal mood/affect, oriented x 3; No abnormal cerebellar tests, No 

abnormal CNs II-XII, No abnormal gait, No aphasia, No EOM palsy, No facial droop

, No motor weakness, No sensory deficit, No depressed affect, No disoriented x 3

, No other, No grossly intact, No power is 5/5 both on sides


Skin:  No normal color, No warm/dry, No cyanosis, No cool, No diaphoresis, No 

damp, No ecchymosis, No jaundice, No mottled, No pallor, No rash, No tattoos/

piercings, No ulcerations, No rash on exposed areas, No ulcerations on exposed 

areas, No other





A/P:


Assessment/Dx:


Non-STEMI,


Paroxysmal atrial fibrillation,


Hypothyroidism,


Anemia,


Hypoxia, pneumonia


.


Plan:





Non-STEMI, positive troponin.  EKG did not show any ST deviation.  Aspirin, 

Plavix, Lovenox.  Continue metoprolol, lisinopril.  We'll start low-dose 

Lipitor.  Coronary angiography on 7/23/2018 showed moderate mid LAD stenosis 

and subtotal occlusion of ostial RCA which was treated successfully with drug-

eluting stent.  Patient will continue on aspirin, Plavix.





Hypoxia, pneumonia: On IV antibiotics. improved but still requiring 7 Liters of 

O2 and O2 saturation is early 90s. Defer to Dr. Castañeda, Dr. Snyder.





Echocardiogram showed normal LV function with no severe valvular heart disease.





Paroxysmal atrial fibrillation, continue Xarelto and rate controlling agent.





Hypothyroidism, continue levothyroxine.





Anemia, deferred to Dr. Castañeda








Thank you for your consultation. Please call me if you have any questions.








AIDAN Jordan MD, FACP, FACC, FSCAI, FHRS, CCDS


Interventional Cardiology


Cardiac Electrophysiology


Vascular Medicine and Endovascular Interventions











MARITZA JORDAN MD Jul 27, 2018 5:54 pm

## 2018-07-27 NOTE — OCCUPATIONAL THER DAILY NOTE
OT Current Status-Daily Note


Subjective


Pt seen in room, seated EOB, agreeable to OT. No pain mentioned.





Appearance


Alert, cooperative





Mental Status/Objective


              Functional [place] Measure


0=Not Assessed/NA   4=Minimal Assistance


1=Total Assistance   5=Supervision or Setup


2=Maximal Assistance   6=Modified [place]


3=Moderate Assistance   7=Complete [place]





ADL-Treatment


              Functional [place] Measure


0=Not Assessed/NA   4=Minimal Assistance


1=Total Assistance   5=Supervision or Setup


2=Maximal Assistance   6=Modified [place]


3=Moderate Assistance   7=Complete IndependenceIRFPAI Quality Coding Scale











6 Independent with activity with or without an assistive device


 


5  Patient requires set up or clean up by helper.  Patient completes activity  

by  themselves


 


4 Supervision or touching assist (CGA). [place] provide cues , steadying assist


 


3 The helper provides less than half the effort to complete the activity


 


2 The helper provides more than half the effort to complete the activity


 


1 Dependent.  The helper does all the effort to complete an activity 


 


7 Patient refused to complete or attempt activity


 


9 The patient did not perform the activity before the current illness or injury


 


88 Not attempted due to Medical conditions or safety concerns











Other Treatment


Pt pleased that O2 decreased to 7L/min. Pt did 5 different bilat UE exercises 

with yellow theraband (gentle resistance), with educ on each exercise and 

occasional cue to do them correctly. She did 15 reps with4 of them and was able 

to tolerate only 10 reps with reaching upward. To strengthen arms to help with 

ADLs and to increase activity tolerance. Pt left up at EOB, all needs met.





Education


OT Patient Education:  Exercise program, Purpose of tx/functional activities


Teaching Recipient:  Patient


Teaching Methods:  Discussion


Response to Teaching:  Verbalize Understanding, Return Demonstration





OT Short Term Goals


Short Term Goals


1=Demonstrate adherence to instructed precautions during ADL tasks.


2=Patient will verbalize/demonstrate understanding of assistive devices/

modifications for ADL.


3=Patient will improve strength/tolerance for activity to enable patient to 

perform ADL's.





OT Long Term Goals


Long Term Goals


Time Frame:  Aug 10, 2018


Eating (FIM):  6


Eating (QC):  6


Groomin


Oral Hygiene (QC):  6


Bathing(FIM):  5


Upper Body Dressing(FIM):  6


Lower Body Dressing(FIM):  6


Toileting(FIM):  6


Toileting Hygiene (QC):  6


Toilet/Commode Transfer(FIM):  6


Toilet/Commode Transfer (QC):  6


Shower Transfer(FIM):  5


Additional Goals:  1-Demonstrate ADL Tasks, 2-Verbalize Understanding, 3-

ImproveStrength/Jennifer


1=Demonstrate adherence to instructed precautions during ADL tasks.


2=Patient will verbalize/demonstrate understanding of assistive devices/

modifications for ADL.


3=Patient will improve strength/tolerance for activity to enable patient to 

perform ADL's.





OT Education/Plan


Problem List/Assessment


Pt would benefit from skilled OT to increase her independence in basic self car 

to allow her to safely return home.





Discharge Recommendations


Plan/Recommendations:  Continue POC





Treatment Plan/Plan of Care


Patient would benefit from OT for education, treatment and training to promote 

independence in ADL's, mobility, safety and/or upper extremity function for ADL'

s.


Plan of Care:  ADL Retraining, Functional Mobility, UE Funct Exercise/Act, UE 

Neuromus Re-Ed/Coord, OTHER (energy conservation educaiton)


Treatment Duration:  Aug 10, 2018


Frequency:  5 times per week


Estimated Hrs Per Day:  .5 hour per day


Agreement:  Yes


Rehab Potential:  Good





Time/GCodes


Start Time:  15:20


Stop Time:  15:35


Total Time Billed (hr/min):  15


Billed Treatment Time


visit, 15 minutes exercise











MARK ANTHONY KERN OT 2018 15:57

## 2018-07-28 VITALS — SYSTOLIC BLOOD PRESSURE: 135 MMHG | DIASTOLIC BLOOD PRESSURE: 60 MMHG

## 2018-07-28 VITALS — DIASTOLIC BLOOD PRESSURE: 51 MMHG | SYSTOLIC BLOOD PRESSURE: 115 MMHG

## 2018-07-28 LAB
%HYPO/RBC NFR BLD AUTO: (no result) %
ANISOCYTOSIS BLD QL SMEAR: (no result)
BASOPHILS # BLD AUTO: 0 10^3/UL (ref 0–0.1)
BASOPHILS NFR BLD AUTO: 0 % (ref 0–10)
BASOPHILS NFR BLD MANUAL: 0 %
BUN/CREAT SERPL: 33
CALCIUM SERPL-MCNC: 9.1 MG/DL (ref 8.5–10.1)
CHLORIDE SERPL-SCNC: 106 MMOL/L (ref 98–107)
CO2 SERPL-SCNC: 26 MMOL/L (ref 21–32)
CREAT SERPL-MCNC: 1.24 MG/DL (ref 0.6–1.3)
EOSINOPHIL # BLD AUTO: 0.1 10^3/UL (ref 0–0.3)
EOSINOPHIL NFR BLD AUTO: 0 % (ref 0–10)
EOSINOPHIL NFR BLD MANUAL: 1 %
ERYTHROCYTE [DISTWIDTH] IN BLOOD BY AUTOMATED COUNT: 16.4 % (ref 10–14.5)
GFR SERPLBLD BASED ON 1.73 SQ M-ARVRAT: 41 ML/MIN
GLUCOSE SERPL-MCNC: 101 MG/DL (ref 70–105)
HCT VFR BLD CALC: 28 % (ref 35–52)
HGB BLD-MCNC: 8.7 G/DL (ref 11.5–16)
LYMPHOCYTES # BLD AUTO: 1.9 X 10^3 (ref 1–4)
LYMPHOCYTES NFR BLD AUTO: 14 % (ref 12–44)
MANUAL DIFFERENTIAL PERFORMED BLD QL: YES
MCH RBC QN AUTO: 27 PG (ref 25–34)
MCHC RBC AUTO-ENTMCNC: 31 G/DL (ref 32–36)
MCV RBC AUTO: 85 FL (ref 80–99)
MONOCYTES # BLD AUTO: 0.9 X 10^3 (ref 0–1)
MONOCYTES NFR BLD AUTO: 7 % (ref 0–12)
MONOCYTES NFR BLD: 8 %
NEUTROPHILS # BLD AUTO: 11.2 X 10^3 (ref 1.8–7.8)
NEUTROPHILS NFR BLD AUTO: 80 % (ref 42–75)
NEUTS BAND NFR BLD MANUAL: 80 %
NEUTS BAND NFR BLD: 0 %
PLATELET # BLD: 310 10^3/UL (ref 130–400)
PMV BLD AUTO: 10.9 FL (ref 7.4–10.4)
POLYCHROMASIA BLD QL SMEAR: SLIGHT
POTASSIUM SERPL-SCNC: 4.9 MMOL/L (ref 3.6–5)
RBC # BLD AUTO: 3.28 10^6/UL (ref 4.35–5.85)
SODIUM SERPL-SCNC: 140 MMOL/L (ref 135–145)
VARIANT LYMPHS NFR BLD MANUAL: 11 %
WBC # BLD AUTO: 14.1 10^3/UL (ref 4.3–11)

## 2018-07-28 RX ADMIN — MONTELUKAST SCH MG: 10 TABLET, FILM COATED ORAL at 09:56

## 2018-07-28 RX ADMIN — SODIUM CHLORIDE SCH MLS/HR: 900 INJECTION, SOLUTION INTRAVENOUS at 15:44

## 2018-07-28 RX ADMIN — CEFDINIR SCH MG: 300 CAPSULE ORAL at 09:56

## 2018-07-28 RX ADMIN — ALPRAZOLAM SCH MG: 0.25 TABLET ORAL at 19:54

## 2018-07-28 RX ADMIN — RIVAROXABAN SCH MG: 15 TABLET, FILM COATED ORAL at 16:56

## 2018-07-28 RX ADMIN — LEVOTHYROXINE SODIUM SCH MCG: 0.09 TABLET ORAL at 05:32

## 2018-07-28 RX ADMIN — ATORVASTATIN CALCIUM SCH MG: 20 TABLET, FILM COATED ORAL at 19:54

## 2018-07-28 RX ADMIN — METOPROLOL TARTRATE SCH MG: 50 TABLET, FILM COATED ORAL at 19:54

## 2018-07-28 RX ADMIN — IPRATROPIUM BROMIDE AND ALBUTEROL SULFATE SCH ML: .5; 3 SOLUTION RESPIRATORY (INHALATION) at 07:40

## 2018-07-28 RX ADMIN — RISPERIDONE SCH MG: 0.25 TABLET, FILM COATED ORAL at 09:56

## 2018-07-28 RX ADMIN — RISPERIDONE SCH MG: 0.25 TABLET, FILM COATED ORAL at 19:54

## 2018-07-28 RX ADMIN — ASPIRIN SCH MG: 81 TABLET ORAL at 09:56

## 2018-07-28 RX ADMIN — METOPROLOL TARTRATE SCH MG: 50 TABLET, FILM COATED ORAL at 09:57

## 2018-07-28 RX ADMIN — IPRATROPIUM BROMIDE AND ALBUTEROL SULFATE SCH ML: .5; 3 SOLUTION RESPIRATORY (INHALATION) at 10:12

## 2018-07-28 RX ADMIN — ACETAMINOPHEN PRN MG: 325 TABLET ORAL at 19:55

## 2018-07-28 RX ADMIN — IPRATROPIUM BROMIDE AND ALBUTEROL SULFATE SCH ML: .5; 3 SOLUTION RESPIRATORY (INHALATION) at 18:44

## 2018-07-28 RX ADMIN — LISINOPRIL SCH MG: 5 TABLET ORAL at 09:57

## 2018-07-28 RX ADMIN — SODIUM CHLORIDE SCH MLS/HR: 900 INJECTION, SOLUTION INTRAVENOUS at 23:51

## 2018-07-28 RX ADMIN — CEFDINIR SCH MG: 300 CAPSULE ORAL at 19:55

## 2018-07-28 RX ADMIN — PANTOPRAZOLE SODIUM SCH MG: 40 TABLET, DELAYED RELEASE ORAL at 05:32

## 2018-07-28 RX ADMIN — ACETAMINOPHEN PRN MG: 325 TABLET ORAL at 05:32

## 2018-07-28 RX ADMIN — Medication SCH MG: at 09:57

## 2018-07-28 RX ADMIN — CLOPIDOGREL BISULFATE SCH MG: 75 TABLET, FILM COATED ORAL at 09:56

## 2018-07-28 RX ADMIN — DILTIAZEM HYDROCHLORIDE SCH MG: 180 CAPSULE, COATED, EXTENDED RELEASE ORAL at 09:56

## 2018-07-28 RX ADMIN — IPRATROPIUM BROMIDE AND ALBUTEROL SULFATE SCH ML: .5; 3 SOLUTION RESPIRATORY (INHALATION) at 14:26

## 2018-07-28 NOTE — PROGRESS NOTE-HOSPITALIST
Subjective


HPI/CC On Admission


Date Seen by Provider:  2018


Time Seen by Provider:  07:55


Subjective/Events-last exam


Called to bedside over worsening sats in the 80s on max Vapotherm. Pt reports 

started when she tried to stand up this morning. Two RTs at bedside for 

assistance. Discussed with patient about trial of BiPAP and she is agreeable.





Objective


Exam


Vital Signs





Vital Signs








  Date Time  Temp Pulse Resp B/P (MAP) Pulse Ox O2 Delivery O2 Flow Rate FiO2


 


18 10:14     87 Vapotherm 30.00 100


 


18 09:45 97.9       


 


18 07:45  58 38     


 


18 05:57    115/51 (72)    





Capillary Refill :


General Appearance:  Anxious, Chronically ill


Respiratory:  Accessory Muscle Use, Decreased Breath Sounds


Cardiovascular:  Regular Rate, Rhythm, No Murmur


Neurologic/Psychiatric:  Alert, Oriented x3





Results/Procedures


Lab


Laboratory Tests


18 08:05








Patient resulted labs reviewed.





Assessment/Plan


Assessment and Plan


Assess & Plan/Chief Complaint


Acute Respiratory Distress





Diagnosis/Problems


Diagnosis/Problems





(1) Emphysema/COPD


Status:  Chronic


Assessment & Plan:  Worsening oxygen sats this AM


Trial BiPAP for now


Pt is a DNR/DNI


Will get CBC, BMP, troponin


   If no improvement of sats on BiPAP will get ABG to confirm hypoxia


CXR ordered


Qualifiers:  


   Emphysema type:  unspecified  Qualified Codes:  J43.9 - Emphysema, 

unspecified


(2) Pneumonia


Status:  Acute


Assessment & Plan:  Will get CXR


If worsening will broaden abx


Qualifiers:  


   Pneumonia type:  due to unspecified organism  Laterality:  left  Lung 

location:  unspecified part of lung  Qualified Codes:  J18.9 - Pneumonia, 

unspecified organism


(3) Essential (primary) hypertension


Status:  Chronic


Assessment & Plan:  Well controlled





(4) Atrial fibrillation


Assessment & Plan:  Rate well controlled


On Xarelto and Diltiazem


Qualifiers:  


   Atrial fibrillation type:  paroxysmal  Qualified Codes:  I48.0 - Paroxysmal 

atrial fibrillation


(5) Counseling regarding end of life decision making


Assessment & Plan:  Worsening respiratory status on max Vapotherm


trial BiPAP


Discussed with patient and family that if could not tolerate biPAP I would 

recommend comfort measures given dyspnea


Family and patient agreeable to plan


Will trial 1x morphine dose for air hunger to see if alleviates symptoms








Clinical Quality Measures


End of Life/Advance Care Plan:


Advance Care discuss with:  patient, family member (s)


End of Life Care:  Pallative Care


Plan:  clarifying prognosis, identified end-of-life goals, developed treatment 

plan


Time spent on discussion(mins):  25 face to face





DVT/VTE Risk/Contraindication:


Risk Factor Score Per Nursin











GERMAN GRADY MD 2018 8:00 am

## 2018-07-28 NOTE — PHYSICAL THERAPY PROGRESS NOTE
Therapy Progress Note


PTA enters room for tx and finds pt on BiPap, granddaughter present.  Nurse 

advises pt not to be seen at this time.  Unable to keep SaO2 up.  SEE today's 

notes from Nurse & Dr Castañeda.





1 visit, no tx rendered











SHERIF MICHAELS PTA Jul 28, 2018 09:46

## 2018-07-28 NOTE — CARDIOLOGY PROGRESS NOTE
Cardiology SOAP Progress Note


Subjective:


Worsening shortness of breath.





Objective:


I&O/Vital Signs











 7/28/18 7/28/18 7/28/18 7/28/18





 05:57 07:30 07:45 08:00


 


Temp 97.9   


 


Pulse 60  58 


 


Resp 18  38 


 


B/P (MAP) 115/51 (72)   


 


Pulse Ox   92 94


 


O2 Delivery Nasal Cannula Nasal Cannula  


 


O2 Flow Rate 7.00 7.00 90.00 90.00


 


    





 7/28/18 7/28/18 7/28/18 7/28/18





 08:00 08:51 08:57 08:58


 


Pulse Ox  79 80 82


 


O2 Delivery High Flow N/C Vapotherm Vapotherm Vapotherm


 


O2 Flow Rate 7.00 20.00 20.00 25.00


 


FiO2  100 100 100





 7/28/18 7/28/18 7/28/18 7/28/18





 09:42 09:45 10:14 14:28


 


Temp  97.9  


 


Pulse Ox 86  87 95


 


O2 Delivery   Vapotherm Vapotherm


 


O2 Flow Rate 30.00  30.00 30.00


 


FiO2 100  100 100


 


    





 7/28/18   





 14:36   


 


O2 Delivery Vapotherm   


 


O2 Flow Rate 25.00   


 


FiO2 100   














 7/28/18





 00:00


 


Intake Total 1112 ml


 


Balance 1112 ml








Weight (Pounds):  109


Weight (Ounces):  8.0


Weight (Calculated Kilograms):  49.536272


Constitutional:  No appears stated age; AAO x 3, apparent distress; No PERRL, 

No well-developed, No well-nourished, No other


Respiratory:  No accessory muscle use, No respiratory distress, No chest tender

, No chest expansion is symmetric; chest is bilaterally symmetric; No lungs 

clear to percussion; lungs clear to auscultation; No crackles, No rhonchi, No 

rales, No stridor, No wheezing, No pleural rub, No other


Cardiovascular:  regular rate-rhythm; No irregularly irregular, No extra beats, 

No parasternal heave is noted, No JVD, No edema, No bradycardia, No tachycardia

, No point of maximal impulse, No cardiac thrills are palpable; S1 and S2; No 

gallop/S3, No gallop/S4, No diastolic murmur, No systolic murmur, No friction 

rub, No click, No other


Gastrointestional:  No tender, No soft, No round, No distended, No pulsatile 

mass, No organomegaly, No guarding, No rebound, No tenderness, No hernia, No 

mass, No audible bowel sounds, No abnormal bowel sounds, No abdominal bruits, 

No spleenomegaly, No other


Extremities:  No normal range of motion, No non-tender, No normal inspection, 

No pedal edema, No calf tenderness, No normal capillary refill, No pelvis stable

, No calf tenderness, No inflammation, No pedal edema, No slow capillary refill

, No swelling, No other, No abrasion, No clubbing, No cyanosis, No ecchymosis, 

No laceration, No no lower extremity edema bilateral, No significant edema, No 

tenderness, No wound


Neurologic/Psychiatric:  No CNs II-XII nml as tested; no motor/sensory deficits

, alert, normal mood/affect, oriented x 3; No abnormal cerebellar tests, No 

abnormal CNs II-XII, No abnormal gait, No aphasia, No EOM palsy, No facial droop

, No motor weakness, No sensory deficit, No depressed affect, No disoriented x 3

, No other, No grossly intact, No power is 5/5 both on sides


Skin:  No normal color, No warm/dry, No cyanosis, No cool, No diaphoresis, No 

damp, No ecchymosis, No jaundice, No mottled, No pallor, No rash, No tattoos/

piercings, No ulcerations, No rash on exposed areas, No ulcerations on exposed 

areas, No other





Results/Procedures:


Labs


Laboratory Tests


7/28/18 08:05: 


White Blood Count 14.1H, Red Blood Count 3.28L, Hemoglobin 8.7L, Hematocrit 28L

, Mean Corpuscular Volume 85, Mean Corpuscular Hemoglobin 27, Mean Corpuscular 

Hemoglobin Concent 31L, Red Cell Distribution Width 16.4H, Platelet Count 310, 

Mean Platelet Volume 10.9H, Neutrophils (%) (Auto) 80H, Lymphocytes (%) (Auto) 

14, Monocytes (%) (Auto) 7, Eosinophils (%) (Auto) 0, Basophils (%) (Auto) 0, 

Neutrophils # (Auto) 11.2H, Lymphocytes # (Auto) 1.9, Monocytes # (Auto) 0.9, 

Eosinophils # (Auto) 0.1, Basophils # (Auto) 0.0, Neutrophils % (Manual) 80, 

Lymphocytes % (Manual) 11, Monocytes % (Manual) 8, Eosinophils % (Manual) 1, 

Basophils % (Manual) 0, Band Neutrophils 0, Polychromasia SLIGHT, Hypochromasia 

MODERATE, Anisocytosis MODERATE, Sodium Level 140, Potassium Level 4.9, 

Chloride Level 106, Carbon Dioxide Level 26, Anion Gap 8, Blood Urea Nitrogen 

41H, Creatinine 1.24, Estimat Glomerular Filtration Rate 41, BUN/Creatinine 

Ratio 33, Glucose Level 101, Calcium Level 9.1, Troponin I < 0.30








A/P:


Assessment/Dx:


Non-STEMI,


Paroxysmal atrial fibrillation,


Hypothyroidism,


Anemia,


Hypoxia, pneumonia


.


Plan:





Non-STEMI, positive troponin.  EKG did not show any ST deviation.  Aspirin, 

Plavix, Lovenox.  Continue metoprolol, lisinopril.  We'll start low-dose 

Lipitor.  Coronary angiography on 7/23/2018 showed moderate mid LAD stenosis 

and subtotal occlusion of ostial RCA which was treated successfully with drug-

eluting stent.  Patient will continue on aspirin, Plavix.





Hypoxia, pneumonia: On IV antibiotics. improved but still requiring 7 Liters of 

O2 and O2 saturation is early 90s. Defer to Dr. Castañeda, Dr. Snyder.





Echocardiogram showed normal LV function with no severe valvular heart disease.





Paroxysmal atrial fibrillation, continue Xarelto and rate controlling agent.





Hypothyroidism, continue levothyroxine.





Anemia, deferred to Dr. Castañeda








Thank you for your consultation. Please call me if you have any questions.








AIDAN Jordan MD, FACP, FACC, FSCAI, FHRS, CCDS


Interventional Cardiology


Cardiac Electrophysiology


Vascular Medicine and Endovascular Interventions





Clinical Quality Measures


Type of Care:


Type of Care:  Pallative Care











MARITZA JORDAN MD Jul 28, 2018 3:10 pm

## 2018-07-28 NOTE — DIAGNOSTIC IMAGING REPORT
EXAM: CHEST 1 VIEW, AP/PA ONLY



INDICATION: Hypoxia. Shortness of breath.



COMPARISON: Chest radiograph 07/24/2018.



FINDINGS: Dense consolidation in the left lung base is new since

prior exam. There is also new, moderate left pleural effusion.

Calcified aorta. Cardiac silhouette is obscured. Chronic right

rib fractures. No acute osseous findings.



IMPRESSION: New dense consolidation in the left lung base and

moderate left pleural effusion.



Dictated by: 



  Dictated on workstation # [id number]

## 2018-07-29 VITALS — SYSTOLIC BLOOD PRESSURE: 121 MMHG | DIASTOLIC BLOOD PRESSURE: 56 MMHG

## 2018-07-29 VITALS — DIASTOLIC BLOOD PRESSURE: 57 MMHG | SYSTOLIC BLOOD PRESSURE: 123 MMHG

## 2018-07-29 LAB
BASOPHILS # BLD AUTO: 0 10^3/UL (ref 0–0.1)
BASOPHILS NFR BLD AUTO: 0 % (ref 0–10)
BUN/CREAT SERPL: 30
CALCIUM SERPL-MCNC: 8.8 MG/DL (ref 8.5–10.1)
CHLORIDE SERPL-SCNC: 101 MMOL/L (ref 98–107)
CO2 SERPL-SCNC: 29 MMOL/L (ref 21–32)
CREAT SERPL-MCNC: 1.21 MG/DL (ref 0.6–1.3)
EOSINOPHIL # BLD AUTO: 0.1 10^3/UL (ref 0–0.3)
EOSINOPHIL NFR BLD AUTO: 1 % (ref 0–10)
ERYTHROCYTE [DISTWIDTH] IN BLOOD BY AUTOMATED COUNT: 16.3 % (ref 10–14.5)
GFR SERPLBLD BASED ON 1.73 SQ M-ARVRAT: 42 ML/MIN
GLUCOSE SERPL-MCNC: 104 MG/DL (ref 70–105)
HCT VFR BLD CALC: 27 % (ref 35–52)
HGB BLD-MCNC: 8.3 G/DL (ref 11.5–16)
LYMPHOCYTES # BLD AUTO: 1.8 X 10^3 (ref 1–4)
LYMPHOCYTES NFR BLD AUTO: 14 % (ref 12–44)
MANUAL DIFFERENTIAL PERFORMED BLD QL: NO
MCH RBC QN AUTO: 26 PG (ref 25–34)
MCHC RBC AUTO-ENTMCNC: 31 G/DL (ref 32–36)
MCV RBC AUTO: 84 FL (ref 80–99)
MONOCYTES # BLD AUTO: 0.9 X 10^3 (ref 0–1)
MONOCYTES NFR BLD AUTO: 7 % (ref 0–12)
NEUTROPHILS # BLD AUTO: 10.3 X 10^3 (ref 1.8–7.8)
NEUTROPHILS NFR BLD AUTO: 78 % (ref 42–75)
PLATELET # BLD: 296 10^3/UL (ref 130–400)
PMV BLD AUTO: 11 FL (ref 7.4–10.4)
POTASSIUM SERPL-SCNC: 5.1 MMOL/L (ref 3.6–5)
RBC # BLD AUTO: 3.22 10^6/UL (ref 4.35–5.85)
SODIUM SERPL-SCNC: 137 MMOL/L (ref 135–145)
WBC # BLD AUTO: 13.2 10^3/UL (ref 4.3–11)

## 2018-07-29 RX ADMIN — IPRATROPIUM BROMIDE AND ALBUTEROL SULFATE SCH ML: .5; 3 SOLUTION RESPIRATORY (INHALATION) at 10:58

## 2018-07-29 RX ADMIN — SODIUM CHLORIDE SCH MLS/HR: 900 INJECTION, SOLUTION INTRAVENOUS at 06:35

## 2018-07-29 RX ADMIN — DILTIAZEM HYDROCHLORIDE SCH MG: 180 CAPSULE, COATED, EXTENDED RELEASE ORAL at 11:18

## 2018-07-29 RX ADMIN — ONDANSETRON PRN MG: 2 INJECTION, SOLUTION INTRAMUSCULAR; INTRAVENOUS at 09:05

## 2018-07-29 RX ADMIN — METOPROLOL TARTRATE SCH MG: 50 TABLET, FILM COATED ORAL at 19:58

## 2018-07-29 RX ADMIN — SODIUM CHLORIDE SCH MLS/HR: 900 INJECTION, SOLUTION INTRAVENOUS at 15:40

## 2018-07-29 RX ADMIN — Medication SCH MG: at 11:18

## 2018-07-29 RX ADMIN — RISPERIDONE SCH MG: 0.25 TABLET, FILM COATED ORAL at 19:58

## 2018-07-29 RX ADMIN — METOPROLOL TARTRATE SCH MG: 50 TABLET, FILM COATED ORAL at 11:18

## 2018-07-29 RX ADMIN — LISINOPRIL SCH MG: 5 TABLET ORAL at 11:18

## 2018-07-29 RX ADMIN — MONTELUKAST SCH MG: 10 TABLET, FILM COATED ORAL at 11:18

## 2018-07-29 RX ADMIN — IPRATROPIUM BROMIDE AND ALBUTEROL SULFATE SCH ML: .5; 3 SOLUTION RESPIRATORY (INHALATION) at 18:57

## 2018-07-29 RX ADMIN — ONDANSETRON PRN MG: 2 INJECTION, SOLUTION INTRAMUSCULAR; INTRAVENOUS at 14:16

## 2018-07-29 RX ADMIN — Medication SCH MG: at 08:29

## 2018-07-29 RX ADMIN — RIVAROXABAN SCH MG: 15 TABLET, FILM COATED ORAL at 16:53

## 2018-07-29 RX ADMIN — SCOPALAMINE SCH MG: 1 PATCH, EXTENDED RELEASE TRANSDERMAL at 11:00

## 2018-07-29 RX ADMIN — CEFDINIR SCH MG: 300 CAPSULE ORAL at 19:59

## 2018-07-29 RX ADMIN — ATORVASTATIN CALCIUM SCH MG: 20 TABLET, FILM COATED ORAL at 19:58

## 2018-07-29 RX ADMIN — PANTOPRAZOLE SODIUM SCH MG: 40 TABLET, DELAYED RELEASE ORAL at 06:34

## 2018-07-29 RX ADMIN — ALPRAZOLAM SCH MG: 0.25 TABLET ORAL at 19:58

## 2018-07-29 RX ADMIN — IPRATROPIUM BROMIDE AND ALBUTEROL SULFATE SCH ML: .5; 3 SOLUTION RESPIRATORY (INHALATION) at 07:05

## 2018-07-29 RX ADMIN — CEFDINIR SCH MG: 300 CAPSULE ORAL at 11:17

## 2018-07-29 RX ADMIN — FLUTICASONE PROPIONATE AND SALMETEROL XINAFOATE PRN PUFF: 115; 21 AEROSOL, METERED RESPIRATORY (INHALATION) at 07:05

## 2018-07-29 RX ADMIN — ASPIRIN SCH MG: 81 TABLET ORAL at 11:18

## 2018-07-29 RX ADMIN — IPRATROPIUM BROMIDE AND ALBUTEROL SULFATE SCH ML: .5; 3 SOLUTION RESPIRATORY (INHALATION) at 14:34

## 2018-07-29 RX ADMIN — LEVOTHYROXINE SODIUM SCH MCG: 0.09 TABLET ORAL at 06:34

## 2018-07-29 RX ADMIN — RISPERIDONE SCH MG: 0.25 TABLET, FILM COATED ORAL at 11:18

## 2018-07-29 RX ADMIN — CLOPIDOGREL BISULFATE SCH MG: 75 TABLET, FILM COATED ORAL at 11:18

## 2018-07-29 NOTE — CARDIOLOGY PROGRESS NOTE
Cardiology SOAP Progress Note


Subjective:


Shortness of breath.  Feeling unwell





Objective:


I&O/Vital Signs











 7/29/18 7/29/18 7/29/18 7/29/18





 01:42 05:46 07:09 08:00


 


Temp  97.8  


 


Pulse  64  


 


Resp  16  


 


B/P (MAP)  123/57 (79)  


 


Pulse Ox 100 97 98 


 


O2 Delivery Vapotherm Vapotherm Vapotherm High Flow N/C


 


O2 Flow Rate 25.00 100.00 25.00 7.00





  25.00  


 


FiO2 70  55 


 


    





 7/29/18   





 10:58   


 


Pulse Ox 92   


 


O2 Delivery Vapotherm   


 


O2 Flow Rate 30.00   


 


FiO2 75   














 7/29/18





 00:00


 


Intake Total 840 ml


 


Output Total 50 ml


 


Balance 790 ml








Weight (Pounds):  109


Weight (Ounces):  8.0


Weight (Calculated Kilograms):  49.645558


Constitutional:  No appears stated age; AAO x 3, apparent distress; No PERRL, 

No well-developed, No well-nourished, No other


Respiratory:  No accessory muscle use, No respiratory distress, No chest tender

, No chest expansion is symmetric; chest is bilaterally symmetric; No lungs 

clear to percussion; lungs clear to auscultation; No crackles, No rhonchi, No 

rales, No stridor, No wheezing, No pleural rub, No other


Cardiovascular:  regular rate-rhythm; No irregularly irregular, No extra beats, 

No parasternal heave is noted, No JVD, No edema, No bradycardia, No tachycardia

, No point of maximal impulse, No cardiac thrills are palpable; S1 and S2; No 

gallop/S3, No gallop/S4, No diastolic murmur, No systolic murmur, No friction 

rub, No click, No other


Gastrointestional:  No tender, No soft, No round, No distended, No pulsatile 

mass, No organomegaly, No guarding, No rebound, No tenderness, No hernia, No 

mass, No audible bowel sounds, No abnormal bowel sounds, No abdominal bruits, 

No spleenomegaly, No other


Extremities:  No normal range of motion, No non-tender, No normal inspection, 

No pedal edema, No calf tenderness, No normal capillary refill, No pelvis stable

, No calf tenderness, No inflammation, No pedal edema, No slow capillary refill

, No swelling, No other, No abrasion, No clubbing, No cyanosis, No ecchymosis, 

No laceration, No no lower extremity edema bilateral, No significant edema, No 

tenderness, No wound


Neurologic/Psychiatric:  No CNs II-XII nml as tested; no motor/sensory deficits

, alert, normal mood/affect, oriented x 3; No abnormal cerebellar tests, No 

abnormal CNs II-XII, No abnormal gait, No aphasia, No EOM palsy, No facial droop

, No motor weakness, No sensory deficit, No depressed affect, No disoriented x 3

, No other, No grossly intact, No power is 5/5 both on sides


Skin:  No normal color, No warm/dry, No cyanosis, No cool, No diaphoresis, No 

damp, No ecchymosis, No jaundice, No mottled, No pallor, No rash, No tattoos/

piercings, No ulcerations, No rash on exposed areas, No ulcerations on exposed 

areas, No other





Results/Procedures:


Labs


Laboratory Tests


7/29/18 08:30: 


White Blood Count 13.2H, Red Blood Count 3.22L, Hemoglobin 8.3L, Hematocrit 27L

, Mean Corpuscular Volume 84, Mean Corpuscular Hemoglobin 26, Mean Corpuscular 

Hemoglobin Concent 31L, Red Cell Distribution Width 16.3H, Platelet Count 296, 

Mean Platelet Volume 11.0H, Neutrophils (%) (Auto) 78H, Lymphocytes (%) (Auto) 

14, Monocytes (%) (Auto) 7, Eosinophils (%) (Auto) 1, Basophils (%) (Auto) 0, 

Neutrophils # (Auto) 10.3H, Lymphocytes # (Auto) 1.8, Monocytes # (Auto) 0.9, 

Eosinophils # (Auto) 0.1, Basophils # (Auto) 0.0, Sodium Level 137, Potassium 

Level 5.1H, Chloride Level 101, Carbon Dioxide Level 29, Anion Gap 7, Blood 

Urea Nitrogen 36H, Creatinine 1.21, Estimat Glomerular Filtration Rate 42, BUN/

Creatinine Ratio 30, Glucose Level 104, Calcium Level 8.8








A/P:


Assessment/Dx:


Non-STEMI,


Paroxysmal atrial fibrillation,


Hypothyroidism,


Anemia,


Hypoxia, pneumonia


.


Plan:





Non-STEMI, positive troponin.  EKG did not show any ST deviation.  Aspirin, 

Plavix, Lovenox.  Continue metoprolol, lisinopril.  We'll start low-dose 

Lipitor.  Coronary angiography on 7/23/2018 showed moderate mid LAD stenosis 

and subtotal occlusion of ostial RCA which was treated successfully with drug-

eluting stent.  Patient will continue on aspirin, Plavix.





Hypoxia, pneumonia: On antibiotics.  Severe hypoxia.  Patient does not want 

BiPAP or intubation.  Poor prognosis.  Currently on Vapotherm.  Dr. Castañeda 

discussing further care with the patient and family.





Echocardiogram showed normal LV function with no severe valvular heart disease.





Paroxysmal atrial fibrillation, continue Xarelto and rate controlling agent.





Hypothyroidism, continue levothyroxine.





Anemia, deferred to Dr. Castañeda








Thank you for your consultation. Please call me if you have any questions.








AIDAN Jordan MD, FACP, FACC, FSCAI, FHRS, CCDS


Interventional Cardiology


Cardiac Electrophysiology


Vascular Medicine and Endovascular Interventions





Clinical Quality Measures


Type of Care:


Type of Care:  Pallative Care











MARITZA JORDAN MD Jul 29, 2018 1:24 pm

## 2018-07-29 NOTE — PROGRESS NOTE-HOSPITALIST
Subjective


HPI/CC On Admission


Date Seen by Provider:  2018


Time Seen by Provider:  09:19


Subjective/Events-last exam


Pt reports feeling "yucky." Slept well but had some nausea with vomiting 

overnight. vomited again this morning. Believes it to be due to the morphine.





Objective


Exam


Vital Signs





Vital Signs








  Date Time  Temp Pulse Resp B/P (MAP) Pulse Ox O2 Delivery O2 Flow Rate FiO2


 


18 08:00      High Flow N/C 7.00 


 


18 07:09     98   55


 


18 05:46 97.8 64 16 123/57 (79)    





Capillary Refill :


General Appearance:  No Apparent Distress, Chronically ill


Respiratory:  No Accessory Muscle Use, No Respiratory Distress, Decreased 

Breath Sounds


Cardiovascular:  Regular Rate, Rhythm, No Murmur


Extremity:  No Calf Tenderness, Pedal Edema


Neurologic/Psychiatric:  Alert, Oriented x3





Results/Procedures


Lab


Laboratory Tests


18 08:30








Patient resulted labs reviewed.





Assessment/Plan


Assessment and Plan


Assess & Plan/Chief Complaint


Acute Respiratory Distress





Diagnosis/Problems


Diagnosis/Problems





(1) Emphysema/COPD


Status:  Chronic


Assessment & Plan:  Worsening oxygen sats this AM


Did not tolerate BiPAP- doing well on Vapotherm currently


Pt is a DNR/DNI


Morphine added for air hunger


Qualifiers:  


   Emphysema type:  unspecified  Qualified Codes:  J43.9 - Emphysema, 

unspecified


(2) Pneumonia


Status:  Acute


Assessment & Plan:  CXR worsened yesterday


Conitnue Zosyn


   Add probiotic


Lasix given


Qualifiers:  


   Pneumonia type:  due to unspecified organism  Laterality:  left  Lung 

location:  unspecified part of lung  Qualified Codes:  J18.9 - Pneumonia, 

unspecified organism


(3) Essential (primary) hypertension


Status:  Chronic


Assessment & Plan:  Well controlled





(4) Atrial fibrillation


Assessment & Plan:  Rate well controlled


On Xarelto and Diltiazem


Qualifiers:  


   Atrial fibrillation type:  paroxysmal  Qualified Codes:  I48.0 - Paroxysmal 

atrial fibrillation


(5) Hyperkalemia


Assessment & Plan:  Lasix as above





(6) Nausea & vomiting


Assessment & Plan:  Zofran prn


Scopolamine added as well


May be due to morphine


Qualifiers:  


   Vomiting type:  unspecified  Vomiting Intractability:  unspecified  

Qualified Codes:  R11.2 - Nausea with vomiting, unspecified


(7) Counseling regarding end of life decision making


Assessment & Plan:  Worsening respiratory status on max Vapotherm


trial BiPAP


Discussed with patient and family that if could not tolerate biPAP I would 

recommend comfort measures given dyspnea


Family and patient agreeable to plan


Will trial 1x morphine dose for air hunger to see if alleviates symptoms








Clinical Quality Measures


DVT/VTE Risk/Contraindication:


Risk Factor Score Per Nursin











GERMAN GRADY MD 2018 09:22

## 2018-07-30 VITALS — SYSTOLIC BLOOD PRESSURE: 90 MMHG | DIASTOLIC BLOOD PRESSURE: 45 MMHG

## 2018-07-30 VITALS — SYSTOLIC BLOOD PRESSURE: 100 MMHG | DIASTOLIC BLOOD PRESSURE: 49 MMHG

## 2018-07-30 VITALS — SYSTOLIC BLOOD PRESSURE: 120 MMHG | DIASTOLIC BLOOD PRESSURE: 56 MMHG

## 2018-07-30 VITALS — SYSTOLIC BLOOD PRESSURE: 100 MMHG | DIASTOLIC BLOOD PRESSURE: 52 MMHG

## 2018-07-30 RX ADMIN — IPRATROPIUM BROMIDE AND ALBUTEROL SULFATE SCH ML: .5; 3 SOLUTION RESPIRATORY (INHALATION) at 14:09

## 2018-07-30 RX ADMIN — ASPIRIN SCH MG: 81 TABLET ORAL at 07:58

## 2018-07-30 RX ADMIN — ATORVASTATIN CALCIUM SCH MG: 20 TABLET, FILM COATED ORAL at 21:11

## 2018-07-30 RX ADMIN — ALPRAZOLAM SCH MG: 0.25 TABLET ORAL at 21:11

## 2018-07-30 RX ADMIN — METOPROLOL TARTRATE SCH MG: 50 TABLET, FILM COATED ORAL at 10:10

## 2018-07-30 RX ADMIN — RIVAROXABAN SCH MG: 15 TABLET, FILM COATED ORAL at 17:25

## 2018-07-30 RX ADMIN — DILTIAZEM HYDROCHLORIDE SCH MG: 180 CAPSULE, COATED, EXTENDED RELEASE ORAL at 09:00

## 2018-07-30 RX ADMIN — Medication SCH MG: at 07:57

## 2018-07-30 RX ADMIN — LISINOPRIL SCH MG: 5 TABLET ORAL at 09:00

## 2018-07-30 RX ADMIN — IPRATROPIUM BROMIDE AND ALBUTEROL SULFATE SCH ML: .5; 3 SOLUTION RESPIRATORY (INHALATION) at 10:27

## 2018-07-30 RX ADMIN — RISPERIDONE SCH MG: 0.25 TABLET, FILM COATED ORAL at 21:11

## 2018-07-30 RX ADMIN — IPRATROPIUM BROMIDE AND ALBUTEROL SULFATE SCH ML: .5; 3 SOLUTION RESPIRATORY (INHALATION) at 19:05

## 2018-07-30 RX ADMIN — SODIUM CHLORIDE SCH MLS/HR: 900 INJECTION, SOLUTION INTRAVENOUS at 02:11

## 2018-07-30 RX ADMIN — LEVOTHYROXINE SODIUM SCH MCG: 0.09 TABLET ORAL at 06:20

## 2018-07-30 RX ADMIN — METOPROLOL TARTRATE SCH MG: 50 TABLET, FILM COATED ORAL at 21:11

## 2018-07-30 RX ADMIN — PANTOPRAZOLE SODIUM SCH MG: 40 TABLET, DELAYED RELEASE ORAL at 06:20

## 2018-07-30 RX ADMIN — CLOPIDOGREL BISULFATE SCH MG: 75 TABLET, FILM COATED ORAL at 07:57

## 2018-07-30 RX ADMIN — IPRATROPIUM BROMIDE AND ALBUTEROL SULFATE SCH ML: .5; 3 SOLUTION RESPIRATORY (INHALATION) at 05:56

## 2018-07-30 RX ADMIN — MONTELUKAST SCH MG: 10 TABLET, FILM COATED ORAL at 07:57

## 2018-07-30 RX ADMIN — SODIUM CHLORIDE SCH MLS/HR: 900 INJECTION, SOLUTION INTRAVENOUS at 06:23

## 2018-07-30 RX ADMIN — SODIUM CHLORIDE SCH MLS/HR: 900 INJECTION, SOLUTION INTRAVENOUS at 15:48

## 2018-07-30 RX ADMIN — RISPERIDONE SCH MG: 0.25 TABLET, FILM COATED ORAL at 08:00

## 2018-07-30 NOTE — PHYSICAL THERAPY PROGRESS NOTE
Therapy Progress Note


Patient has declined in medical status.  PT to hold treatment and will monitor 

patient status.  PT visited with family on POC and they agree.


1 visit











MELIDA COOPER PT Jul 30, 2018 09:46

## 2018-07-30 NOTE — CARDIOLOGY PROGRESS NOTE
Cardiology SOAP Progress Note


Subjective:


Severe shortness of breath.  In apparent distress.





Objective:


I&O/Vital Signs











 7/30/18 7/30/18 7/30/18 7/30/18





 05:54 05:56 06:08 06:09


 


Temp 97.2   


 


Pulse 58  42 56


 


Resp 16  20 


 


B/P (MAP) 120/56 (77)   100/49 (66)


 


Pulse Ox 83 65 83 56


 


O2 Delivery Vapotherm Vapotherm  NIV Bilevel


 


O2 Flow Rate 70.00 100.00 100.00 





 26.00   


 


FiO2  40  


 


    





 7/30/18 7/30/18 7/30/18 





 07:46 08:00 10:32 


 


Pulse 60   


 


B/P (MAP) 90/45 (60)   


 


Pulse Ox 91 91 89 


 


O2 Delivery Vapotherm Vapotherm Vapotherm 


 


O2 Flow Rate 100.00 35.00 100.00 





 35.00   


 


FiO2  100 35 














 7/30/18





 00:00


 


Intake Total 600 ml


 


Output Total 350 ml


 


Balance 250 ml








Weight (Pounds):  109


Weight (Ounces):  8.0


Weight (Calculated Kilograms):  49.211474


Constitutional:  No appears stated age; AAO x 3, apparent distress; No PERRL, 

No well-developed, No well-nourished, No other


Respiratory:  No accessory muscle use, No respiratory distress, No chest tender

, No chest expansion is symmetric; chest is bilaterally symmetric; No lungs 

clear to percussion; lungs clear to auscultation; No crackles, No rhonchi, No 

rales, No stridor, No wheezing, No pleural rub, No other


Cardiovascular:  regular rate-rhythm; No irregularly irregular, No extra beats, 

No parasternal heave is noted, No JVD, No edema, No bradycardia, No tachycardia

, No point of maximal impulse, No cardiac thrills are palpable; S1 and S2; No 

gallop/S3, No gallop/S4, No diastolic murmur, No systolic murmur, No friction 

rub, No click, No other


Gastrointestional:  No tender, No soft, No round, No distended, No pulsatile 

mass, No organomegaly, No guarding, No rebound, No tenderness, No hernia, No 

mass, No audible bowel sounds, No abnormal bowel sounds, No abdominal bruits, 

No spleenomegaly, No other


Extremities:  No normal range of motion, No non-tender, No normal inspection, 

No pedal edema, No calf tenderness, No normal capillary refill, No pelvis stable

, No calf tenderness, No inflammation, No pedal edema, No slow capillary refill

, No swelling, No other, No abrasion, No clubbing, No cyanosis, No ecchymosis, 

No laceration, No no lower extremity edema bilateral, No significant edema, No 

tenderness, No wound


Neurologic/Psychiatric:  No CNs II-XII nml as tested; no motor/sensory deficits

, alert, normal mood/affect, oriented x 3; No abnormal cerebellar tests, No 

abnormal CNs II-XII, No abnormal gait, No aphasia, No EOM palsy, No facial droop

, No motor weakness, No sensory deficit, No depressed affect, No disoriented x 3

, No other, No grossly intact, No power is 5/5 both on sides


Skin:  No normal color, No warm/dry, No cyanosis, No cool, No diaphoresis, No 

damp, No ecchymosis, No jaundice, No mottled, No pallor, No rash, No tattoos/

piercings, No ulcerations, No rash on exposed areas, No ulcerations on exposed 

areas, No other





A/P:


Assessment/Dx:


Non-STEMI,


Paroxysmal atrial fibrillation,


Hypothyroidism,


Anemia,


Hypoxia, pneumonia


.


Plan:





Non-STEMI, status post ostial RCA drug-eluting stent.  Patient will continue on 

aspirin, Plavix.





Hypoxia, pneumonia: On antibiotics.  Severe hypoxia.  Patient does not want 

BiPAP or intubation.  Poor prognosis.  Currently on Vapotherm.  Dr. Castañeda 

discussing further care with the patient and family.





Echocardiogram showed normal LV function with no severe valvular heart disease.





Paroxysmal atrial fibrillation, continue Xarelto and rate controlling agent.





Hypothyroidism, continue levothyroxine.





Anemia, deferred to Dr. Castañeda








Thank you for your consultation. Please call me if you have any questions.








AIDAN Jordan MD, FACP, FACC, FSCAI, FHRS, CCDS


Interventional Cardiology


Cardiac Electrophysiology


Vascular Medicine and Endovascular Interventions





Clinical Quality Measures


Type of Care:


Type of Care:  Pallative Care











MARITZA JORDAN MD Jul 30, 2018 10:57 am

## 2018-07-30 NOTE — OCC THERAPY PROGRESS NOTE
Therapy Progress Note


1000   Pt with declining medical status. Pt preferred to hold therapy today and 

rest. Will follow. 





visit











MARK ANTHONY KERN OT Jul 30, 2018 10:07

## 2018-07-31 VITALS — DIASTOLIC BLOOD PRESSURE: 54 MMHG | SYSTOLIC BLOOD PRESSURE: 93 MMHG

## 2018-07-31 VITALS — DIASTOLIC BLOOD PRESSURE: 51 MMHG | SYSTOLIC BLOOD PRESSURE: 93 MMHG

## 2018-07-31 RX ADMIN — IPRATROPIUM BROMIDE AND ALBUTEROL SULFATE SCH ML: .5; 3 SOLUTION RESPIRATORY (INHALATION) at 14:12

## 2018-07-31 RX ADMIN — METOPROLOL TARTRATE SCH MG: 50 TABLET, FILM COATED ORAL at 20:16

## 2018-07-31 RX ADMIN — SODIUM CHLORIDE SCH MLS/HR: 900 INJECTION, SOLUTION INTRAVENOUS at 06:28

## 2018-07-31 RX ADMIN — PANTOPRAZOLE SODIUM SCH MG: 40 TABLET, DELAYED RELEASE ORAL at 06:27

## 2018-07-31 RX ADMIN — MONTELUKAST SCH MG: 10 TABLET, FILM COATED ORAL at 09:29

## 2018-07-31 RX ADMIN — LISINOPRIL SCH MG: 5 TABLET ORAL at 09:29

## 2018-07-31 RX ADMIN — IPRATROPIUM BROMIDE AND ALBUTEROL SULFATE SCH ML: .5; 3 SOLUTION RESPIRATORY (INHALATION) at 07:11

## 2018-07-31 RX ADMIN — LORAZEPAM PRN MG: 0.5 TABLET ORAL at 20:14

## 2018-07-31 RX ADMIN — IPRATROPIUM BROMIDE AND ALBUTEROL SULFATE SCH ML: .5; 3 SOLUTION RESPIRATORY (INHALATION) at 18:46

## 2018-07-31 RX ADMIN — LORAZEPAM PRN MG: 0.5 TABLET ORAL at 13:56

## 2018-07-31 RX ADMIN — CLOPIDOGREL BISULFATE SCH MG: 75 TABLET, FILM COATED ORAL at 09:29

## 2018-07-31 RX ADMIN — RIVAROXABAN SCH MG: 15 TABLET, FILM COATED ORAL at 17:57

## 2018-07-31 RX ADMIN — LEVOTHYROXINE SODIUM SCH MCG: 0.09 TABLET ORAL at 06:27

## 2018-07-31 RX ADMIN — Medication SCH MG: at 09:28

## 2018-07-31 RX ADMIN — SODIUM CHLORIDE SCH MLS/HR: 900 INJECTION, SOLUTION INTRAVENOUS at 00:54

## 2018-07-31 RX ADMIN — RISPERIDONE SCH MG: 0.25 TABLET, FILM COATED ORAL at 20:16

## 2018-07-31 RX ADMIN — DILTIAZEM HYDROCHLORIDE SCH MG: 180 CAPSULE, COATED, EXTENDED RELEASE ORAL at 09:29

## 2018-07-31 RX ADMIN — IPRATROPIUM BROMIDE AND ALBUTEROL SULFATE SCH ML: .5; 3 SOLUTION RESPIRATORY (INHALATION) at 10:04

## 2018-07-31 RX ADMIN — RISPERIDONE SCH MG: 0.25 TABLET, FILM COATED ORAL at 09:29

## 2018-07-31 RX ADMIN — ASPIRIN SCH MG: 81 TABLET ORAL at 09:29

## 2018-07-31 RX ADMIN — METOPROLOL TARTRATE SCH MG: 50 TABLET, FILM COATED ORAL at 09:29

## 2018-07-31 NOTE — OCCUPATIONAL THER DAILY NOTE
OT Current Status-Daily Note


Subjective


Pt sitting in recliner, eyes closed.  Responded to name, eyes closed with 

responses.  Pt stated that she had already been cleaned up today when asked.  

Pt asked if she was going to move because she could only do a couple.  Pt c/o 

bottom hurting, unable to rate.  Pt requested to go back to bed, nrsg okayed.





Mental Status/Objective


Patient Orientation:  Person, Confused


              Functional [place] Measure


0=Not Assessed/NA   4=Minimal Assistance


1=Total Assistance   5=Supervision or Setup


2=Maximal Assistance   6=Modified [place]


3=Moderate Assistance   7=Complete [place]


Attachments:  IV, Other-See Comments (Vapotherm)





ADL-Treatment


Patient is up in recliner with vapotherm 30L/80%.  Patient is confused and 

pale.  Patient continues to decline medically.  Pt requested to move from 

recliner to bed.  Pt min A to go from sit to stand.  Required assist to 

maneuver FWW when transferring to bed.  Pt able to go from EOB to supine by 

self then required assist to scoot up in bed x2.  Pt confused about time of 

day. After therapy, pt lying in bed with eyes closed stating that she was cold, 

4 blankets on pt.  Call light/phone in reach, safety measures in place.  All 

needs met in room.


              Functional [place] Measure


0=Not Assessed/NA   4=Minimal Assistance


1=Total Assistance   5=Supervision or Setup


2=Maximal Assistance   6=Modified [place]


3=Moderate Assistance   7=Complete IndependenceIRFPAI Quality Coding Scale











6 Independent with activity with or without an assistive device


 


5  Patient requires set up or clean up by helper.  Patient completes activity  

by  themselves


 


4 Supervision or touching assist (CGA). [place] provide cues , steadying assist


 


3 The helper provides less than half the effort to complete the activity


 


2 The helper provides more than half the effort to complete the activity


 


1 Dependent.  The helper does all the effort to complete an activity 


 


7 Patient refused to complete or attempt activity


 


9 The patient did not perform the activity before the current illness or injury


 


88 Not attempted due to Medical conditions or safety concerns











OT Short Term Goals


Short Term Goals


1=Demonstrate adherence to instructed precautions during ADL tasks.


2=Patient will verbalize/demonstrate understanding of assistive devices/

modifications for ADL.


3=Patient will improve strength/tolerance for activity to enable patient to 

perform ADL's.





OT Long Term Goals


Long Term Goals


Time Frame:  Aug 10, 2018


Eating (FIM):  6


Eating (QC):  6


Groomin


Oral Hygiene (QC):  6


Bathing(FIM):  5


Upper Body Dressing(FIM):  6


Lower Body Dressing(FIM):  6


Toileting(FIM):  6


Toileting Hygiene (QC):  6


Toilet/Commode Transfer(FIM):  6


Toilet/Commode Transfer (QC):  6


Shower Transfer(FIM):  5


Additional Goals:  1-Demonstrate ADL Tasks, 2-Verbalize Understanding, 3-

ImproveStrength/Jennifer


1=Demonstrate adherence to instructed precautions during ADL tasks.


2=Patient will verbalize/demonstrate understanding of assistive devices/

modifications for ADL.


3=Patient will improve strength/tolerance for activity to enable patient to 

perform ADL's.





OT Education/Plan


Problem List/Assessment


Pt would benefit from skilled OT to increase her independence in basic self car 

to allow her to safely return home.





Discharge Recommendations


Plan/Recommendations:  Continue POC





Treatment Plan/Plan of Care


Patient would benefit from OT for education, treatment and training to promote 

independence in ADL's, mobility, safety and/or upper extremity function for ADL'

s.


Plan of Care:  ADL Retraining, Functional Mobility, UE Funct Exercise/Act, UE 

Neuromus Re-Ed/Coord, OTHER (energy conservation educaiton)


Treatment Duration:  Aug 10, 2018


Frequency:  5 times per week


Estimated Hrs Per Day:  .5 hour per day


Agreement:  Yes


Rehab Potential:  Good





Time/GCodes


Start Time:  11:28


Stop Time:  11:44


Total Time Billed (hr/min):  16


Billed Treatment Time


1 visit-FA 1 (16 min)











RABIA CORTEZ 2018 11:50

## 2018-07-31 NOTE — CARDIOLOGY PROGRESS NOTE
Cardiology SOAP Progress Note


Subjective:


Severe shortness of breath.





Objective:


I&O/Vital Signs











 7/31/18 7/31/18 7/31/18 7/31/18





 07:11 08:00 08:18 10:04


 


Temp  96.8  


 


Pulse  69  


 


Resp  26  


 


B/P (MAP)  93/51 (65)  


 


Pulse Ox 90 82 91 95


 


O2 Delivery Vapotherm Vapotherm Vapotherm Vapotherm


 


O2 Flow Rate 30.00 80.00 35.00 30.00





  30.00  


 


FiO2 80  100 80


 


    





 7/31/18 7/31/18  





 14:12 17:02  


 


Temp  98.8  


 


Pulse  117  


 


Resp  20  


 


B/P (MAP)  93/54 (67)  


 


Pulse Ox 75 97  


 


O2 Delivery Vapotherm Vapotherm  


 


O2 Flow Rate 20.00 80.00  





  20.00  


 


FiO2 80   














 7/31/18





 00:00


 


Intake Total 950 ml


 


Output Total 100 ml


 


Balance 850 ml








Weight (Pounds):  109


Weight (Ounces):  8.0


Weight (Calculated Kilograms):  49.730592


Constitutional:  No appears stated age; AAO x 3, apparent distress; No PERRL, 

No well-developed, No well-nourished, No other


Respiratory:  No accessory muscle use, No respiratory distress, No chest tender

, No chest expansion is symmetric; chest is bilaterally symmetric; No lungs 

clear to percussion; lungs clear to auscultation; No crackles, No rhonchi, No 

rales, No stridor, No wheezing, No pleural rub, No other


Cardiovascular:  regular rate-rhythm; No irregularly irregular, No extra beats, 

No parasternal heave is noted, No JVD, No edema, No bradycardia, No tachycardia

, No point of maximal impulse, No cardiac thrills are palpable; S1 and S2; No 

gallop/S3, No gallop/S4, No diastolic murmur, No systolic murmur, No friction 

rub, No click, No other


Gastrointestional:  No tender, No soft, No round, No distended, No pulsatile 

mass, No organomegaly, No guarding, No rebound, No tenderness, No hernia, No 

mass, No audible bowel sounds, No abnormal bowel sounds, No abdominal bruits, 

No spleenomegaly, No other


Extremities:  No normal range of motion, No non-tender, No normal inspection, 

No pedal edema, No calf tenderness, No normal capillary refill, No pelvis stable

, No calf tenderness, No inflammation, No pedal edema, No slow capillary refill

, No swelling, No other, No abrasion, No clubbing, No cyanosis, No ecchymosis, 

No laceration, No no lower extremity edema bilateral, No significant edema, No 

tenderness, No wound


Neurologic/Psychiatric:  No CNs II-XII nml as tested; no motor/sensory deficits

, alert, normal mood/affect, oriented x 3; No abnormal cerebellar tests, No 

abnormal CNs II-XII, No abnormal gait, No aphasia, No EOM palsy, No facial droop

, No motor weakness, No sensory deficit, No depressed affect, No disoriented x 3

, No other, No grossly intact, No power is 5/5 both on sides


Skin:  No normal color, No warm/dry, No cyanosis, No cool, No diaphoresis, No 

damp, No ecchymosis, No jaundice, No mottled, No pallor, No rash, No tattoos/

piercings, No ulcerations, No rash on exposed areas, No ulcerations on exposed 

areas, No other





A/P:


Assessment/Dx:


Non-STEMI,


Paroxysmal atrial fibrillation,


Hypothyroidism,


Anemia,


Hypoxia, pneumonia


.


Plan:





Non-STEMI, status post ostial RCA drug-eluting stent.  Patient will continue on 

aspirin, Plavix.





Hypoxia, pneumonia: On antibiotics.  Severe hypoxia.  Patient does not want 

BiPAP or intubation.  Poor prognosis.  Currently on Vapotherm.  Hospitalist 

service discussing further care with the patient and family.





Echocardiogram showed normal LV function with no severe valvular heart disease.





Paroxysmal atrial fibrillation, continue Xarelto and rate controlling agent.





Hypothyroidism, continue levothyroxine.





Anemia, deferred to Dr. Castañeda








Thank you for your consultation. Please call me if you have any questions.








AIDAN Jordan MD, FACP, FACC, FSCAI, FHRS, CCDS


Interventional Cardiology


Cardiac Electrophysiology


Vascular Medicine and Endovascular Interventions





Clinical Quality Measures


Type of Care:


Type of Care:  Pallative Care











MARITZA JORDAN MD Jul 31, 2018 6:07 pm

## 2018-07-31 NOTE — PHYSICAL THERAPY PROGRESS NOTE
Therapy Progress Note


Patient is up in recliner with vapotherm 30L/80%, however, it was not in her 

nose.  SAO2 71%, recovered to 83% when placed back into nose.  Patient is 

confused and pale.  Patient continues to decline medically.  Will determine if 

PT will continue to monitor patient status.  


1 visit











MELIDA COOPER PT Jul 31, 2018 10:16

## 2018-07-31 NOTE — PROGRESS NOTE-HOSPITALIST
Progress Note


Progress Notes/Assess & Plan


Date Seen


7/31/18


Time Seen by Provider:  12:32


Assessment & Plan


The patient is deeply somnolent and declining.  She has previously stated to 

the nurses that she was ready to go.  It would appear she is not far from 

achieving her goal.





Physical exam: The patient is deeply somnolent and does not respond to voice or 

touch.  Lungs show her to have tachypnea and shallow respirations.  CV is 

mildly irregular with controlled rate.  Extremities show no pedal edema.





Impression: Continued declining condition.  2.recent N STEMI.  3.history of 

paroxysmal atrial fibrillation











LARY ODOM MD Jul 31, 2018 12:36

## 2018-08-01 VITALS — SYSTOLIC BLOOD PRESSURE: 106 MMHG | DIASTOLIC BLOOD PRESSURE: 56 MMHG

## 2018-08-01 VITALS — DIASTOLIC BLOOD PRESSURE: 59 MMHG | SYSTOLIC BLOOD PRESSURE: 132 MMHG

## 2018-08-01 RX ADMIN — CLOPIDOGREL BISULFATE SCH MG: 75 TABLET, FILM COATED ORAL at 09:49

## 2018-08-01 RX ADMIN — MORPHINE SULFATE PRN MG: 4 INJECTION, SOLUTION INTRAMUSCULAR; INTRAVENOUS at 22:54

## 2018-08-01 RX ADMIN — IPRATROPIUM BROMIDE AND ALBUTEROL SULFATE SCH ML: .5; 3 SOLUTION RESPIRATORY (INHALATION) at 07:37

## 2018-08-01 RX ADMIN — PANTOPRAZOLE SODIUM SCH MG: 40 TABLET, DELAYED RELEASE ORAL at 06:59

## 2018-08-01 RX ADMIN — RISPERIDONE SCH MG: 0.25 TABLET, FILM COATED ORAL at 09:49

## 2018-08-01 RX ADMIN — ACETAMINOPHEN PRN MG: 325 TABLET ORAL at 15:45

## 2018-08-01 RX ADMIN — IPRATROPIUM BROMIDE AND ALBUTEROL SULFATE SCH ML: .5; 3 SOLUTION RESPIRATORY (INHALATION) at 10:51

## 2018-08-01 RX ADMIN — MONTELUKAST SCH MG: 10 TABLET, FILM COATED ORAL at 09:49

## 2018-08-01 RX ADMIN — METOPROLOL TARTRATE SCH MG: 50 TABLET, FILM COATED ORAL at 09:49

## 2018-08-01 RX ADMIN — RIVAROXABAN SCH MG: 15 TABLET, FILM COATED ORAL at 17:54

## 2018-08-01 RX ADMIN — ONDANSETRON PRN MG: 2 INJECTION, SOLUTION INTRAMUSCULAR; INTRAVENOUS at 22:54

## 2018-08-01 RX ADMIN — LORAZEPAM PRN MG: 0.5 TABLET ORAL at 19:22

## 2018-08-01 RX ADMIN — DILTIAZEM HYDROCHLORIDE SCH MG: 180 CAPSULE, COATED, EXTENDED RELEASE ORAL at 09:48

## 2018-08-01 RX ADMIN — IPRATROPIUM BROMIDE AND ALBUTEROL SULFATE SCH ML: .5; 3 SOLUTION RESPIRATORY (INHALATION) at 14:36

## 2018-08-01 RX ADMIN — ASPIRIN SCH MG: 81 TABLET ORAL at 09:49

## 2018-08-01 RX ADMIN — METOPROLOL TARTRATE SCH MG: 50 TABLET, FILM COATED ORAL at 19:39

## 2018-08-01 RX ADMIN — RISPERIDONE SCH MG: 0.25 TABLET, FILM COATED ORAL at 19:39

## 2018-08-01 RX ADMIN — LEVOTHYROXINE SODIUM SCH MCG: 0.09 TABLET ORAL at 06:59

## 2018-08-01 RX ADMIN — SCOPALAMINE SCH MG: 1 PATCH, EXTENDED RELEASE TRANSDERMAL at 08:52

## 2018-08-01 NOTE — PROGRESS NOTE-HOSPITALIST
Subjective


HPI/CC On Admission


Date Seen by Provider:  Aug 1, 2018


Time Seen by Provider:  10:00


Subjective/Events-last exam


Daughter at bedside and having difficulty with end stage status


Patient's son will arrive today


Comfort care protocol is recommended


Vapotherm is still maintained


Pt denies pain





Review of Systems


Pulmonary:  Dyspnea





Objective


Exam


Vital Signs





Vital Signs








  Date Time  Temp Pulse Resp B/P (MAP) Pulse Ox O2 Delivery O2 Flow Rate FiO2


 


18 14:36     81 Vapotherm 24.00 80


 


18 06:04 98.0 86 15 132/59 (83)    





Capillary Refill :


General Appearance:  No Apparent Distress, WD/WN, Cachetic


Respiratory:  Lungs Clear, Normal Breath Sounds, Decreased Breath Sounds


Cardiovascular:  Regular Rate, Rhythm, No Edema


Neurologic/Psychiatric:  Alert, Oriented x3, No Motor/Sensory Deficits, Normal 

Mood/Affect





Results/Procedures


Lab


Patient resulted labs reviewed.





Assessment/Plan


Assessment and Plan


Assess & Plan/Chief Complaint


End stage process


respiratory failure





Plan:


Comfort care


Appreciate Palliative care





Diagnosis/Problems


Diagnosis/Problems





(1) Emphysema/COPD


Status:  Chronic


Qualifiers:  


   Emphysema type:  unspecified  Qualified Codes:  J43.9 - Emphysema, 

unspecified


(2) RESPIRATORY FAILURE, UNSP, UNSP W HYPOXIA OR HYPERCAPNIA


Status:  Acute


(3) End of life care


Status:  Acute





Clinical Quality Measures


DVT/VTE Risk/Contraindication:


Risk Factor Score Per Nursin











LAILA SALGADO DO Aug 1, 2018 10:35

## 2018-08-01 NOTE — CARDIOLOGY PROGRESS NOTE
Cardiology SOAP Progress Note


Subjective:


Severe distress.





Objective:


I&O/Vital Signs











 8/1/18 8/1/18 8/1/18 8/1/18





 06:04 07:37 08:35 10:52


 


Temp 98.0   


 


Pulse 86   


 


Resp 15   


 


B/P (MAP) 132/59 (83)   


 


Pulse Ox 86 77 91 84


 


O2 Delivery Vapotherm Vapotherm Vapotherm Vapotherm


 


O2 Flow Rate 80.00 24.00 20.00 24.00





 20.00   


 


FiO2  80 80 80


 


    





 8/1/18   





 14:36   


 


Pulse Ox 81   


 


O2 Delivery Vapotherm   


 


O2 Flow Rate 24.00   


 


FiO2 80   














 8/1/18





 00:00


 


Intake Total 270 ml


 


Output Total 175 ml


 


Balance 95 ml








Weight (Pounds):  109


Weight (Ounces):  8.0


Weight (Calculated Kilograms):  49.296333


Constitutional:  No appears stated age; AAO x 3, apparent distress; No PERRL, 

No well-developed, No well-nourished, No other


Respiratory:  No accessory muscle use, No respiratory distress, No chest tender

, No chest expansion is symmetric; chest is bilaterally symmetric; No lungs 

clear to percussion, No lungs clear to auscultation, No crackles; rhonchi, rales

; No stridor, No wheezing, No pleural rub, No other


Cardiovascular:  regular rate-rhythm; No irregularly irregular, No extra beats, 

No parasternal heave is noted, No JVD, No edema, No bradycardia, No tachycardia

, No point of maximal impulse, No cardiac thrills are palpable; S1 and S2; No 

gallop/S3, No gallop/S4, No diastolic murmur, No systolic murmur, No friction 

rub, No click, No other


Gastrointestional:  No tender, No soft, No round, No distended, No pulsatile 

mass, No organomegaly, No guarding, No rebound, No tenderness, No hernia, No 

mass, No audible bowel sounds, No abnormal bowel sounds, No abdominal bruits, 

No spleenomegaly, No other


Extremities:  No normal range of motion, No non-tender, No normal inspection, 

No pedal edema, No calf tenderness, No normal capillary refill, No pelvis stable

, No calf tenderness, No inflammation, No pedal edema, No slow capillary refill

, No swelling, No other, No abrasion, No clubbing, No cyanosis, No ecchymosis, 

No laceration, No no lower extremity edema bilateral, No significant edema, No 

tenderness, No wound


Neurologic/Psychiatric:  No CNs II-XII nml as tested; no motor/sensory deficits

, alert, normal mood/affect, oriented x 3; No abnormal cerebellar tests, No 

abnormal CNs II-XII, No abnormal gait, No aphasia, No EOM palsy, No facial droop

, No motor weakness, No sensory deficit, No depressed affect, No disoriented x 3

, No other, No grossly intact, No power is 5/5 both on sides


Skin:  No normal color, No warm/dry, No cyanosis, No cool, No diaphoresis, No 

damp, No ecchymosis, No jaundice, No mottled, No pallor, No rash, No tattoos/

piercings, No ulcerations, No rash on exposed areas, No ulcerations on exposed 

areas, No other





A/P:


Assessment/Dx:


Non-STEMI,


Paroxysmal atrial fibrillation,


Hypothyroidism,


Anemia,


Hypoxia, pneumonia


.


Plan:





Non-STEMI, status post ostial RCA drug-eluting stent.  Patient will continue on 

aspirin, Plavix.





Hypoxia, pneumonia: On antibiotics.  Severe hypoxia.  Patient does not want 

BiPAP or intubation.  Poor prognosis.  Currently on Vapotherm.  Hospitalist 

service discussing further care with the patient and family.





Echocardiogram showed normal LV function with no severe valvular heart disease.





Paroxysmal atrial fibrillation, continue Xarelto and rate controlling agent.





Hypothyroidism, continue levothyroxine.





Anemia,








Thank you for your consultation. Please call me if you have any questions.








AIDAN Jordan MD, FACP, FACC, FSCAI, FHRS, CCDS


Interventional Cardiology


Cardiac Electrophysiology


Vascular Medicine and Endovascular Interventions





Clinical Quality Measures


Type of Care:


Type of Care:  Pallative Care











MARITZA JORDAN MD Aug 1, 2018 2:44 pm

## 2018-08-01 NOTE — PHYSICAL THERAPY DAILY NOTE
PT Daily Note-Current


Subjective


Family present.  Report they feel she is comfortable in bed.  Pt answers this 

therapists questions.





Transfers


              Functional [place] Measure


0=Not Assessed/NA   4=Minimal Assistance


1=Total Assistance   5=Supervision or Setup


2=Maximal Assistance   6=Modified [place]


3=Moderate Assistance   7=Complete IndependenceIRFPAI Quality Coding Scale











6 Independent with activity with or without an assistive device


 


5  Patient requires set up or clean up by helper.  Patient completes activity  

by  themselves


 


4 Supervision or touching assist (CGA). [place] provide cues , steadying assist


 


3 The helper provides less than half the effort to complete the activity


 


2 The helper provides more than half the effort to complete the activity


 


1 Dependent.  The helper does all the effort to complete an activity 


 


7 Patient refused to complete or attempt activity


 


9 The patient did not perform the activity before the current illness or injury


 


88 Not attempted due to Medical conditions or safety concerns











Weight Bearing


Right Lower Extremity:  Right


Weight Bearing/Tolerated


Left Lower Extremity:  Left


Weight Bearing/Tolerated





Treatments


Spent time with patient and family discussing positioning for comfort and 

educated on skin care and protection. They voiced understanding (granddaughter 

is a nurse.) 


Assisted pt with gentle ROM of her legs for comfort while in bed.  OT then 

entered and assisted this PT with scooting up in bed and rolling pt to change 

the pad under her; OT addressed positioning and comfort while in bed..  Again, 

educated family on rolling and comfort with rolling and placement of pillows 

for comfort and optimal positioning.  Pt in right sidelying post treatment with 

needs met.





Assessment


Pt's medical status limits up to chair this date.  Family present and express 

they feel she is comfortable and fine in bed. Positioned for comfort.  Pt 

tolerated well.





PT Long Term Goals


Long Term Goals


PT Long Term Goals Time Frame:  Aug 10, 2018


Transfers (B,C,W/C) (FIM):  6


Sit to Lying (QC):  6


Lying-Sitting on Side/Bed(QC):  6


Sit to Stand (QC):  6


Rollin


Chair/Bed-to-Chair Xfer(QC):  6


Does the Patient Walk:  Yes


Gait (FIM):  6


Gait distance (FIM):  3=150 ft


Distance:  250'


Walk 50ft with 2 Turns (QC):  6


Walk 150 ft (QC):  6


Gait Level of Assist:  6


Gait Assistive Device:  FWW





PT Plan


Problem List


Problem List:  Activity Tolerance, Functional Strength, Safety





Treatment/Plan


Treatment Plan:  Continue Plan of Care


Treatment Plan:  Bed Mobility, Education, Functional Activity Jennifer, Functional 

Strength, Gait, Safety, Therapeutic Exercise


Treatment Duration:  Aug 10, 2018


Frequency:  6 times per week


Estimated Hrs Per Day:  .25 hour per day


Patient and/or Family Agrees t:  Yes





Safety Risks/Education


Patient Education:  Reviewed Precautions, Safety Issues


Teaching Recipient:  Patient, Family


Response to Teaching:  Reinforcement Needed





Time/GCodes


Time In:  1105


Time Out:  1125


Total Billed Treatment Time:  20


Total Billed Treatment


visit


FA 20


Co treat [id number]











RABIA CLARKE PT Aug 1, 2018 11:44

## 2018-08-01 NOTE — OCCUPATIONAL THER DAILY NOTE
OT Current Status-Daily Note


Subjective


Pt alert, lying in bed.  Family present.  Co-treat with PT for skilled care due 

to pt's decline in medical status and increase fatigue.





Mental Status/Objective


Patient Orientation:  Person


              Functional [place] Measure


0=Not Assessed/NA   4=Minimal Assistance


1=Total Assistance   5=Supervision or Setup


2=Maximal Assistance   6=Modified [place]


3=Moderate Assistance   7=Complete [place]


Attachments:  Parmar Catheter, IV, Oxygen (vapotherm)





ADL-Treatment


PT/OT scooted pt up in bed, assisted pt with rolling to change the pad under her

; OT addressed positioning and comfort while in bed.  Pt was able to assist 

with roll side to side by reaching with UE's and hold onto bed rails.  Pt made 

comfortable in bed.  Family present in room.  All needs met in room.


              Functional [place] Measure


0=Not Assessed/NA   4=Minimal Assistance


1=Total Assistance   5=Supervision or Setup


2=Maximal Assistance   6=Modified [place]


3=Moderate Assistance   7=Complete [place] Quality Coding Scale











6 Independent with activity with or without an assistive device


 


5  Patient requires set up or clean up by helper.  Patient completes activity  

by  themselves


 


4 Supervision or touching assist (CGA). [place] provide cues , steadying assist


 


3 The helper provides less than half the effort to complete the activity


 


2 The helper provides more than half the effort to complete the activity


 


1 Dependent.  The helper does all the effort to complete an activity 


 


7 Patient refused to complete or attempt activity


 


9 The patient did not perform the activity before the current illness or injury


 


88 Not attempted due to Medical conditions or safety concerns











OT Short Term Goals


Short Term Goals


1=Demonstrate adherence to instructed precautions during ADL tasks.


2=Patient will verbalize/demonstrate understanding of assistive devices/

modifications for ADL.


3=Patient will improve strength/tolerance for activity to enable patient to 

perform ADL's.





OT Long Term Goals


Long Term Goals


Time Frame:  Aug 10, 2018


Eating (FIM):  6


Eating (QC):  6


Groomin


Oral Hygiene (QC):  6


Bathing(FIM):  5


Upper Body Dressing(FIM):  6


Lower Body Dressing(FIM):  6


Toileting(FIM):  6


Toileting Hygiene (QC):  6


Toilet/Commode Transfer(FIM):  6


Toilet/Commode Transfer (QC):  6


Shower Transfer(FIM):  5


Additional Goals:  1-Demonstrate ADL Tasks, 2-Verbalize Understanding, 3-

ImproveStrength/Jennifer


1=Demonstrate adherence to instructed precautions during ADL tasks.


2=Patient will verbalize/demonstrate understanding of assistive devices/

modifications for ADL.


3=Patient will improve strength/tolerance for activity to enable patient to 

perform ADL's.





OT Education/Plan


Problem List/Assessment


Pt would benefit from skilled OT to increase her independence in basic self car 

to allow her to safely return home.





Discharge Recommendations


Plan/Recommendations:  Continue POC





Treatment Plan/Plan of Care


Patient would benefit from OT for education, treatment and training to promote 

independence in ADL's, mobility, safety and/or upper extremity function for ADL'

s.


Plan of Care:  ADL Retraining, Functional Mobility, UE Funct Exercise/Act, UE 

Neuromus Re-Ed/Coord, OTHER (energy conservation educaiton)


Treatment Duration:  Aug 10, 2018


Frequency:  5 times per week


Estimated Hrs Per Day:  .5 hour per day


Agreement:  Yes


Rehab Potential:  Good





Time/GCodes


Start Time:  11:15


Stop Time:  11:25


Total Time Billed (hr/min):  10


Billed Treatment Time


1 visit-FA 1 (10 min)











RABIA CORTEZ Aug 1, 2018 11:31

## 2018-08-02 VITALS — DIASTOLIC BLOOD PRESSURE: 61 MMHG | SYSTOLIC BLOOD PRESSURE: 139 MMHG

## 2018-08-02 RX ADMIN — RISPERIDONE SCH MG: 0.25 TABLET, FILM COATED ORAL at 21:16

## 2018-08-02 RX ADMIN — LEVOTHYROXINE SODIUM SCH MCG: 0.09 TABLET ORAL at 09:09

## 2018-08-02 RX ADMIN — DILTIAZEM HYDROCHLORIDE SCH MG: 180 CAPSULE, COATED, EXTENDED RELEASE ORAL at 09:08

## 2018-08-02 RX ADMIN — ASPIRIN SCH MG: 81 TABLET ORAL at 09:09

## 2018-08-02 RX ADMIN — LORAZEPAM PRN MG: 0.5 TABLET ORAL at 21:14

## 2018-08-02 RX ADMIN — ONDANSETRON PRN MG: 2 INJECTION, SOLUTION INTRAMUSCULAR; INTRAVENOUS at 12:06

## 2018-08-02 RX ADMIN — METOPROLOL TARTRATE SCH MG: 50 TABLET, FILM COATED ORAL at 09:09

## 2018-08-02 RX ADMIN — RISPERIDONE SCH MG: 0.25 TABLET, FILM COATED ORAL at 09:11

## 2018-08-02 RX ADMIN — MORPHINE SULFATE PRN MG: 4 INJECTION, SOLUTION INTRAMUSCULAR; INTRAVENOUS at 09:07

## 2018-08-02 RX ADMIN — CLOPIDOGREL BISULFATE SCH MG: 75 TABLET, FILM COATED ORAL at 09:09

## 2018-08-02 RX ADMIN — MONTELUKAST SCH MG: 10 TABLET, FILM COATED ORAL at 09:09

## 2018-08-02 RX ADMIN — METOPROLOL TARTRATE SCH MG: 50 TABLET, FILM COATED ORAL at 21:16

## 2018-08-02 RX ADMIN — PANTOPRAZOLE SODIUM SCH MG: 40 TABLET, DELAYED RELEASE ORAL at 09:09

## 2018-08-02 RX ADMIN — RIVAROXABAN SCH MG: 15 TABLET, FILM COATED ORAL at 17:38

## 2018-08-02 RX ADMIN — ONDANSETRON PRN MG: 2 INJECTION, SOLUTION INTRAMUSCULAR; INTRAVENOUS at 09:04

## 2018-08-02 RX ADMIN — PANTOPRAZOLE SODIUM SCH MG: 40 TABLET, DELAYED RELEASE ORAL at 07:19

## 2018-08-02 RX ADMIN — MORPHINE SULFATE PRN MG: 4 INJECTION, SOLUTION INTRAMUSCULAR; INTRAVENOUS at 22:06

## 2018-08-02 RX ADMIN — ONDANSETRON PRN MG: 2 INJECTION, SOLUTION INTRAMUSCULAR; INTRAVENOUS at 20:00

## 2018-08-02 RX ADMIN — LEVOTHYROXINE SODIUM SCH MCG: 0.09 TABLET ORAL at 07:19

## 2018-08-02 RX ADMIN — MORPHINE SULFATE PRN MG: 4 INJECTION, SOLUTION INTRAMUSCULAR; INTRAVENOUS at 20:01

## 2018-08-02 NOTE — OCC THERAPY PROGRESS NOTE
Therapy Progress Note


Family concerned with pt just receiving medication and wanted therapy to come 

back later.  Will check on pt later this morning.











RABIA CORTEZ Aug 2, 2018 10:10

## 2018-08-02 NOTE — CARDIOLOGY PROGRESS NOTE
Cardiology SOAP Progress Note


Subjective:


severely hypoxic.





Objective:


I&O/Vital Signs











 8/2/18 8/2/18 8/2/18 8/2/18





 06:19 07:01 08:00 10:29


 


Temp 98.4   


 


Pulse 89   


 


Resp 18   


 


B/P (MAP) 139/61 (87)   


 


Pulse Ox 88   


 


O2 Delivery Vapotherm Vapotherm Vapotherm High Flow N/C


 


O2 Flow Rate 80.00 16.00 16.00 15.00





 16.00   


 


FiO2  80 80 














 8/2/18





 00:00


 


Intake Total 352 ml


 


Output Total 700 ml


 


Balance -348 ml








Weight (Pounds):  109


Weight (Ounces):  8.0


Weight (Calculated Kilograms):  49.068396


Constitutional:  No appears stated age; AAO x 3, apparent distress; No PERRL, 

No well-developed, No well-nourished, No other


Respiratory:  No accessory muscle use, No respiratory distress, No chest tender

, No chest expansion is symmetric; chest is bilaterally symmetric; No lungs 

clear to percussion, No lungs clear to auscultation, No crackles; rhonchi, rales

; No stridor, No wheezing, No pleural rub, No other


Cardiovascular:  regular rate-rhythm; No irregularly irregular, No extra beats, 

No parasternal heave is noted, No JVD, No edema, No bradycardia, No tachycardia

, No point of maximal impulse, No cardiac thrills are palpable; S1 and S2; No 

gallop/S3, No gallop/S4, No diastolic murmur, No systolic murmur, No friction 

rub, No click, No other


Gastrointestional:  No tender, No soft, No round, No distended, No pulsatile 

mass, No organomegaly, No guarding, No rebound, No tenderness, No hernia, No 

mass, No audible bowel sounds, No abnormal bowel sounds, No abdominal bruits, 

No spleenomegaly, No other


Extremities:  No normal range of motion, No non-tender, No normal inspection, 

No pedal edema, No calf tenderness, No normal capillary refill, No pelvis stable

, No calf tenderness, No inflammation, No pedal edema, No slow capillary refill

, No swelling, No other, No abrasion, No clubbing, No cyanosis, No ecchymosis, 

No laceration, No no lower extremity edema bilateral, No significant edema, No 

tenderness, No wound


Neurologic/Psychiatric:  No CNs II-XII nml as tested; no motor/sensory deficits

, alert, normal mood/affect, oriented x 3; No abnormal cerebellar tests, No 

abnormal CNs II-XII, No abnormal gait, No aphasia, No EOM palsy, No facial droop

, No motor weakness, No sensory deficit, No depressed affect, No disoriented x 3

, No other, No grossly intact, No power is 5/5 both on sides


Skin:  No normal color, No warm/dry, No cyanosis, No cool, No diaphoresis, No 

damp, No ecchymosis, No jaundice, No mottled, No pallor, No rash, No tattoos/

piercings, No ulcerations, No rash on exposed areas, No ulcerations on exposed 

areas, No other





A/P:


Assessment/Dx:


Non-STEMI,


Paroxysmal atrial fibrillation,


Hypothyroidism,


Anemia,


Hypoxia, pneumonia


.


Plan:





Non-STEMI, status post ostial RCA drug-eluting stent.  Patient will continue on 

aspirin, Plavix.





Hypoxia, pneumonia: On antibiotics.  Severe hypoxia.  Patient does not want 

BiPAP or intubation.  Poor prognosis.  Currently on Vapotherm.  Hospitalist 

service discussing further care with the patient and family.





Echocardiogram showed normal LV function with no severe valvular heart disease.





Paroxysmal atrial fibrillation, continue Xarelto and rate controlling agent.





Hypothyroidism, continue levothyroxine.





Anemia,








Thank you for your consultation. Please call me if you have any questions.








AIDAN Jordan MD, FACP, FACC, FSCAI, FHRS, CCDS


Interventional Cardiology


Cardiac Electrophysiology


Vascular Medicine and Endovascular Interventions





Clinical Quality Measures


Type of Care:


Type of Care:  Pallative Care











MARITZA JORDAN MD Aug 2, 2018 1:25 pm

## 2018-08-02 NOTE — PHYSICAL THERAPY PROGRESS NOTE
Therapy Progress Note


Pt laying Supine in bed upon arrival.  Pt declines tx at this time, stating she 

is tired and just wants to rest.  Pt's daughter & granddaughter present.  Pt 

has all needs met upon PTA leaving.





1 visit, no tx rendered











SHERIF MICHAELS PTA Aug 2, 2018 13:56

## 2018-08-02 NOTE — OCCUPATIONAL THER DAILY NOTE
OT Current Status-Daily Note


Subjective


Pt lethargic, lying in bed.   Pt confused and instructions needed to be 

repeated.  Nrsg and TURNER completed bathing.





Mental Status/Objective


Patient Orientation:  Person


              Functional [place] Measure


0=Not Assessed/NA   4=Minimal Assistance


1=Total Assistance   5=Supervision or Setup


2=Maximal Assistance   6=Modified [place]


3=Moderate Assistance   7=Complete [place]


Attachments:  Parmar Catheter, IV, Oxygen





ADL-Treatment


Pt required verbal cues to initiate tasks.  CGA supine to EOB.  CGA x2 to 

ambulate from bed to chair and back.  Pt completed sponge bath after set up.  

Able to bath face and upper body.  Assist for rest due to decreased activity 

tolerance.  Assist to scoot up in bed.  After therapy, pt lying in bed with 

call light/phone in reach.  O2 in place.  Family and  in room at end of 

therapy.  All needs met.


              Functional [place] Measure


0=Not Assessed/NA   4=Minimal Assistance


1=Total Assistance   5=Supervision or Setup


2=Maximal Assistance   6=Modified [place]


3=Moderate Assistance   7=Complete [place] Quality Coding Scale











6 Independent with activity with or without an assistive device


 


5  Patient requires set up or clean up by helper.  Patient completes activity  

by  themselves


 


4 Supervision or touching assist (CGA). [place] provide cues , steadying assist


 


3 The helper provides less than half the effort to complete the activity


 


2 The helper provides more than half the effort to complete the activity


 


1 Dependent.  The helper does all the effort to complete an activity 


 


7 Patient refused to complete or attempt activity


 


9 The patient did not perform the activity before the current illness or injury


 


88 Not attempted due to Medical conditions or safety concerns








Bathing (FIM):  2


Bathing Location:  L Arm, R Arm, Abdomen





OT Short Term Goals


Short Term Goals


1=Demonstrate adherence to instructed precautions during ADL tasks.


2=Patient will verbalize/demonstrate understanding of assistive devices/

modifications for ADL.


3=Patient will improve strength/tolerance for activity to enable patient to 

perform ADL's.





OT Long Term Goals


Long Term Goals


Time Frame:  Aug 10, 2018


Eating (FIM):  6


Eating (QC):  6


Groomin


Oral Hygiene (QC):  6


Bathing(FIM):  5


Upper Body Dressing(FIM):  6


Lower Body Dressing(FIM):  6


Toileting(FIM):  6


Toileting Hygiene (QC):  6


Toilet/Commode Transfer(FIM):  6


Toilet/Commode Transfer (QC):  6


Shower Transfer(FIM):  5


Additional Goals:  1-Demonstrate ADL Tasks, 2-Verbalize Understanding, 3-

ImproveStrength/Jennifer


1=Demonstrate adherence to instructed precautions during ADL tasks.


2=Patient will verbalize/demonstrate understanding of assistive devices/

modifications for ADL.


3=Patient will improve strength/tolerance for activity to enable patient to 

perform ADL's.





OT Education/Plan


Problem List/Assessment


Pt would benefit from skilled OT to increase her independence in basic self car 

to allow her to safely return home.





Discharge Recommendations


Plan/Recommendations:  Continue POC





Treatment Plan/Plan of Care


Patient would benefit from OT for education, treatment and training to promote 

independence in ADL's, mobility, safety and/or upper extremity function for ADL'

s.


Plan of Care:  ADL Retraining, Functional Mobility, UE Funct Exercise/Act, UE 

Neuromus Re-Ed/Coord, OTHER (energy conservation educaiton)


Treatment Duration:  Aug 10, 2018


Frequency:  5 times per week


Estimated Hrs Per Day:  .5 hour per day


Agreement:  Yes


Rehab Potential:  Good





Time/GCodes


Start Time:  11:00


Stop Time:  11:23


Total Time Billed (hr/min):  23


Billed Treatment Time


1 visit-ADL 2 (23 min)











RABIA CORTEZ Aug 2, 2018 11:28

## 2018-08-03 RX ADMIN — RISPERIDONE SCH MG: 0.25 TABLET, FILM COATED ORAL at 09:56

## 2018-08-03 RX ADMIN — MORPHINE SULFATE PRN MG: 4 INJECTION, SOLUTION INTRAMUSCULAR; INTRAVENOUS at 11:42

## 2018-08-03 RX ADMIN — MORPHINE SULFATE PRN MG: 4 INJECTION, SOLUTION INTRAMUSCULAR; INTRAVENOUS at 06:43

## 2018-08-03 RX ADMIN — MONTELUKAST SCH MG: 10 TABLET, FILM COATED ORAL at 09:56

## 2018-08-03 RX ADMIN — LORAZEPAM PRN MG: 2 INJECTION INTRAMUSCULAR; INTRAVENOUS at 18:06

## 2018-08-03 RX ADMIN — MORPHINE SULFATE PRN MG: 4 INJECTION, SOLUTION INTRAMUSCULAR; INTRAVENOUS at 15:56

## 2018-08-03 RX ADMIN — CLOPIDOGREL BISULFATE SCH MG: 75 TABLET, FILM COATED ORAL at 09:56

## 2018-08-03 RX ADMIN — LORAZEPAM PRN MG: 2 INJECTION INTRAMUSCULAR; INTRAVENOUS at 13:19

## 2018-08-03 RX ADMIN — MORPHINE SULFATE PRN MG: 4 INJECTION, SOLUTION INTRAMUSCULAR; INTRAVENOUS at 18:38

## 2018-08-03 RX ADMIN — MORPHINE SULFATE PRN MG: 4 INJECTION, SOLUTION INTRAMUSCULAR; INTRAVENOUS at 22:00

## 2018-08-03 RX ADMIN — DILTIAZEM HYDROCHLORIDE SCH MG: 180 CAPSULE, COATED, EXTENDED RELEASE ORAL at 09:56

## 2018-08-03 RX ADMIN — LORAZEPAM PRN MG: 2 INJECTION INTRAMUSCULAR; INTRAVENOUS at 20:18

## 2018-08-03 RX ADMIN — LORAZEPAM PRN MG: 2 INJECTION INTRAMUSCULAR; INTRAVENOUS at 09:50

## 2018-08-03 RX ADMIN — METOPROLOL TARTRATE SCH MG: 50 TABLET, FILM COATED ORAL at 09:56

## 2018-08-03 RX ADMIN — ASPIRIN SCH MG: 81 TABLET ORAL at 09:56

## 2018-08-03 NOTE — PROGRESS NOTE-HOSPITALIST
Subjective


HPI/CC On Admission


Date Seen by Provider:  Aug 3, 2018


Time Seen by Provider:  12:00


Subjective/Events-last exam


Patient in comfort care protocol


Patient declining


Family at bedside





Objective


Exam


Vital Signs





Vital Signs








  Date Time  Temp Pulse Resp B/P (MAP) Pulse Ox O2 Delivery O2 Flow Rate FiO2


 


8/3/18 08:00     88 High Flow N/C 15.00 


 


18 20:00        80


 


18 06:19 98.4 89 18 139/61 (87)    





Capillary Refill :


General Appearance:  No Apparent Distress, Other (Unresponsive)





Results/Procedures


Lab


Patient resulted labs reviewed.





Assessment/Plan


Assessment and Plan


Assess & Plan/Chief Complaint


End stage process


respiratory failure





Plan:


Comfort care


Appreciate Palliative care





Diagnosis/Problems


Diagnosis/Problems





(1) Emphysema/COPD


Status:  Chronic


Qualifiers:  


   Emphysema type:  unspecified  Qualified Codes:  J43.9 - Emphysema, 

unspecified


(2) RESPIRATORY FAILURE, UNSP, UNSP W HYPOXIA OR HYPERCAPNIA


Status:  Acute


(3) End of life care


Status:  Acute





Clinical Quality Measures


DVT/VTE Risk/Contraindication:


Risk Factor Score Per Nursin











LAILA SALGADO DO Aug 3, 2018 12:40

## 2018-08-03 NOTE — THERAPY TEAM DISCHARGE SUMMARY
Therapy Discharge Summary


Discharge Recommendations


Date of Discharge





Therapy D/C Recommendations:  24 hr Supervision





Physical Therapy


Patient swing bed with diagnosis of NSTEMI.  Upon evaluation patient performed 

bed mobility and transfers with SBA, ambulated 200' with a rolling walker and 

O2 with SBA.  Patient had a rapid decline in medial status and is expected to 

pass soon.  Patient will be discharged from PT at this time.





PT Long Term Goals


Long Term Goals


PT Long Term Goals Time Frame:  Aug 10, 2018


Transfers (B,C,W/C) (FIM):  6


Sit to Lying (QC):  6


Lying-Sitting on Side/Bed(QC):  6


Sit to Stand (QC):  6


Rollin


Chair/Bed-to-Chair Xfer(QC):  6


Does the Patient Walk:  Yes


Gait (FIM):  6


Gait distance (FIM):  3=150 ft


Distance:  250'


Walk 50ft with 2 Turns (QC):  6


Walk 150 ft (QC):  6


Gait Level of Assist:  6


Gait Assistive Device:  FWW





OT Long Term Goals


Long Term Goals


Time Frame:  Aug 10, 2018


Eating (FIM):  6 (not met)


Eating (QC):  6 (not met)


Groomin (not met)


Oral Hygiene (QC):  6 (not met)


Bathing(FIM):  5 (not met)


Upper Body Dressing(FIM):  6 (not met)


Lower Body Dressing(FIM):  6 (not met)


Toileting(FIM):  6 (not met)


Toileting Hygiene (QC):  6 (not met)


Toilet/Commode Transfer(FIM):  6 (not met)


Toilet/Commode Transfer (QC):  6 (not met)


Shower Transfer(FIM):  5 (not met)


Additional Goals:  1-Demonstrate ADL Tasks, 2-Verbalize Understanding, 3-

ImproveStrength/Jennifer


1=Demonstrate adherence to instructed precautions during ADL tasks.


2=Patient will verbalize/demonstrate understanding of assistive devices/

modifications for ADL.


3=Patient will improve strength/tolerance for activity to enable patient to 

perform ADL's.











LUDWIN MONTILLA PT Aug 3, 2018 13:46

## 2018-08-03 NOTE — THERAPY TEAM DISCHARGE SUMMARY
Therapy Discharge Summary


Discharge Recommendations


Date of Discharge


8-3-18


Therapy D/C Recommendations:  24 hr Supervision





Occupational Therapy


OT has seen pt. for increased strength and independence.  Unfortunately, pt. is 

on comfort care at this time and unresponsive.  Will discharge OT services.  

Thank you.





PT Long Term Goals


Long Term Goals


PT Long Term Goals Time Frame:  Aug 10, 2018


Transfers (B,C,W/C) (FIM):  6


Sit to Lying (QC):  6


Lying-Sitting on Side/Bed(QC):  6


Sit to Stand (QC):  6


Rollin


Chair/Bed-to-Chair Xfer(QC):  6


Does the Patient Walk:  Yes


Gait (FIM):  6


Gait distance (FIM):  3=150 ft


Distance:  250'


Walk 50ft with 2 Turns (QC):  6


Walk 150 ft (QC):  6


Gait Level of Assist:  6


Gait Assistive Device:  FWW





OT Long Term Goals


Long Term Goals


Time Frame:  Aug 10, 2018


Eating (FIM):  6 (not met)


Eating (QC):  6 (not met)


Groomin (not met)


Oral Hygiene (QC):  6 (not met)


Bathing(FIM):  5 (not met)


Upper Body Dressing(FIM):  6 (not met)


Lower Body Dressing(FIM):  6 (not met)


Toileting(FIM):  6 (not met)


Toileting Hygiene (QC):  6 (not met)


Toilet/Commode Transfer(FIM):  6 (not met)


Toilet/Commode Transfer (QC):  6 (not met)


Shower Transfer(FIM):  5 (not met)


Additional Goals:  1-Demonstrate ADL Tasks, 2-Verbalize Understanding, 3-

ImproveStrength/Jennifer


1=Demonstrate adherence to instructed precautions during ADL tasks.


2=Patient will verbalize/demonstrate understanding of assistive devices/

modifications for ADL.


3=Patient will improve strength/tolerance for activity to enable patient to 

perform ADL's.











OMID PIÑA OT Aug 3, 2018 13:40

## 2018-08-10 NOTE — DISCHARGE SUMMARY-HOSPITALIST
Diagnosis/Chief Complaint


Date of Admission


2018 at 09:47


Date of Discharge


Aug 4, 2018 at 02:20


Discharge Diagnosis





(1) RESPIRATORY FAILURE, UNSP, UNSP W HYPOXIA OR HYPERCAPNIA


Status:  Acute


(2) Emphysema/COPD


Status:  Chronic


(3) End of life care


Status:  Acute





Discharge Summary


Discharge Physical Exam


Allergies:  


Coded Allergies:  


     Sulfa (Sulfonamide Antibiotics) (Unverified  Allergy, Unknown, 18)


Vitals & I&Os





Vital Signs








  Date Time  Temp Pulse Resp B/P (MAP) Pulse Ox O2 Delivery O2 Flow Rate FiO2


 


18 02:20        








General Appearance:  Other ()





Hospital Course


Hospital course: patient had a lengthy swing bed hospital course during 

transition to end of life comfort care. Family was at bedside at time of 

decision to titrate Vapotherm in order to wean off O2 for end of life support. 

Palliative care was invaluable during this transition and allowed the family to 

be supported during this difficult time. Overall patient was kept comfortable 

and was unresponsive for several days prior to passing away.


Labs (last 24 hrs)


Patient resulted labs reviewed.





Discussion & Recommendations


Discharge Planning:  <30 minutes discharge planning





Discharge


Home Medications:





Active Scripts


Active


Reported


Acetaminophen 325 Mg Tablet 325-650 Mg PO Q4H PRN


Incruse Ellipta (Umeclidinium Bromide) 62.5 Mcg Blst.w.dev 1 Puff IH DAILY


Metoclopramide HCl 5 Mg Tablet 5 Mg PO BIDAC


     FILLED #60 18


Advil (Ibuprofen) 200 Mg Tablet 200 Mg PO Q6H PRN


Alprazolam 0.25 Mg Tablet 0.25 Mg PO HS


Cyanocobalamin Injection (Cyanocobalamin) 1,000 Mcg/Ml Inj 1,000 Mcg INJ MONTHLY


Magnesium Oxide 400 Mg Tablet 400 Mg PO DAILY


Benzonatate 100 Mg Capsule 100 Mg PO Q6H PRN


Xarelto (Rivaroxaban) 15 Mg Tablet 15 Mg PO DAILY


Montelukast Sodium 10 Mg Tablet 10 Mg PO DAILY


Levothyroxine Sodium 88 Mcg Tablet 88 Mcg PO DAILY


Omeprazole 40 Mg Capsule.dr 40 Mg PO DAILY PRN


Levocetirizine Dihydrochloride 5 Mg Tablet 5 Mg PO DAILY PRN


Albuterol Sulfate 2.5 Mg/3 Ml Vial.neb 2.5 Mg NEB Q6H PRN


Breo Ellipta 100-25 Mcg INH (Fluticasone/Vilanterol) 1 Each Blst.w.dev 1 Puff 

IH DAILY





Instructions to patient/family


Please see electronic discharge instructions given to patient.





Clinical Quality Measures


DVT/VTE Risk/Contraindication:


Risk Factor Score Per Nursin





Problem Qualifiers





(1) Emphysema/COPD:  


Emphysema type:  unspecified  Qualified Codes:  J43.9 - Emphysema, unspecified








LAILA SALGADO DO Aug 10, 2018 12:52

## 2023-06-14 NOTE — PULMONARY PROGRESS NOTE
Subjective


Time Seen by Provider:  06:03


Subjective/Events-last exam


Pt is having increased mucous production and SOB.





Sepsis Event


Evaluation


Height, Weight, BMI


Height: 5'1.00"


Weight: 110lbs. 6.0oz. 50.094075ap; 20.7 BMI


Method:Stated





Exam


Exam





Vital Signs








  Date Time  Temp Pulse Resp B/P (MAP) Pulse Ox O2 Delivery O2 Flow Rate FiO2


 


7/25/18 03:55     92 High Flow N/C 8.00 


 


7/25/18 03:50 97.5 61 18 101/63 (76) 92 High Flow N/C 8.00 


 


7/25/18 01:00  54      


 


7/24/18 23:30 97.6 57 20 120/59 (79) 92 High Flow N/C 8.00 


 


7/24/18 23:30     92 High Flow N/C 8.00 


 


7/24/18 20:00     90 OxyMask 8.00 


 


7/24/18 19:38      OxyMask 8.00 


 


7/24/18 19:35      OxyMask 6.00 


 


7/24/18 19:20     90 High Flow N/C 8.00 


 


7/24/18 19:20 97.5 66 24 130/68 (88) 90 High Flow N/C 8.00 


 


7/24/18 19:01     89 High Flow N/C 8.00 


 


7/24/18 19:00  62      


 


7/24/18 16:30     93 High Flow N/C 8.00 


 


7/24/18 16:30 97.4 59 13 100/42 (61) 93 High Flow N/C 8.00 


 


7/24/18 14:03     90 High Flow N/C 8.00 


 


7/24/18 13:00  58      


 


7/24/18 12:56     86 High Flow N/C 6.00 


 


7/24/18 12:55     83 High Flow N/C 5.00 


 


7/24/18 12:22 98.4 56 21 112/65 (81) 94 High Flow N/C 5.00 


 


7/24/18 12:22     94 High Flow N/C 5.00 


 


7/24/18 11:30     95 High Flow N/C 6.00 


 


7/24/18 11:18     96 High Flow N/C 7.00 


 


7/24/18 10:24     92 OxyMask 8.00 


 


7/24/18 09:41  60 21 120/66 (84) 91 OxyMask 8.00 


 


7/24/18 09:35     88 OxyMask 8.00 


 


7/24/18 09:31     87 OxyMask 8.00 


 


7/24/18 09:20     89 High Flow N/C 8.00 


 


7/24/18 09:18     83 High Flow N/C 7.00 


 


7/24/18 09:15     86 High Flow N/C 6.00 


 


7/24/18 08:43     91 High Flow N/C 5.00 


 


7/24/18 08:34 97.2 69 22 171/97 (121) 91 High Flow N/C 6.00 


 


7/24/18 08:30      High Flow N/C 6.00 


 


7/24/18 08:30     91 High Flow N/C 6.00 


 


7/24/18 07:11     87 Nasal Cannula 6.00 


 


7/24/18 07:00  65      














I & O 


 


 7/25/18





 07:00


 


Intake Total 697 ml


 


Output Total 1550 ml


 


Balance -853 ml








Height & Weight


Height: 5'1.00"


Weight: 110lbs. 6.0oz. 50.823515rs; 20.7 BMI


Method:Stated


General Appearance:  Anxious, Chronically ill


HEENT:  PERRL/EOMI, Normal ENT Inspection, Pharynx Normal


Neck:  Full Range of Motion, Normal Inspection, Non Tender, Supple


Respiratory:  No Respiratory Distress, Accessory Muscle Use, Crackles, 

Decreased Breath Sounds, Rhonci


Cardiovascular:  Regular Rate, Rhythm, No Murmur


Capillary Refill:  Less Than 3 Seconds


Gastrointestinal:  normal bowel sounds, non tender


Extremity:  Non Tender, No Calf Tenderness, No Pedal Edema


Neurologic/Psychiatric:  Alert, Other (oriented to person and situation- not to 

place)


Skin:  Normal Color, Warm/Dry





Results


Lab


Laboratory Tests


7/24/18 05:10











Assessment/Plan


Assessment/Plan


COPDAE r/o PNA 


   -Check pan cultures 


   -SVNS 


   -Add solumedrol 40 IV Q6 


Hyperkalemia secondary to 40 KCL yesterday 


   -PT had KCL with 40 of Lasix yesterday 


NSTEMI s/p cath 


   -Cardiology is following    


   -ASA, Plavix, Lovenox


CAD


   -Cardiology following 


oxygen dependent severe COPD with emphysema 


   -SVNS 


   -monitor 


Afib 


   -Diltiazem, metoprolol Xarelto


Atelectasis 


   -Increase activity, IS 


fracture ribs 


Atelectasis 


Hx of renal cell carcinoma s/p R nephrectomy 11/16











TIO TORIBIO DO Jul 25, 2018 05:32 [Normal] : normal rate, regular rhythm, normal S1 and S2 and no murmur heard [No Varicosities] : no varicosities [Pedal Pulses Present] : the pedal pulses are present [No Edema] : there was no peripheral edema [No Extremity Clubbing/Cyanosis] : no extremity clubbing/cyanosis [Soft] : abdomen soft [Non Tender] : non-tender [Non-distended] : non-distended [Normal Bowel Sounds] : normal bowel sounds